# Patient Record
Sex: MALE | Race: WHITE | NOT HISPANIC OR LATINO | Employment: FULL TIME | URBAN - METROPOLITAN AREA
[De-identification: names, ages, dates, MRNs, and addresses within clinical notes are randomized per-mention and may not be internally consistent; named-entity substitution may affect disease eponyms.]

---

## 2021-11-03 ENCOUNTER — TELEPHONE (OUTPATIENT)
Dept: FAMILY MEDICINE | Facility: CLINIC | Age: 61
End: 2021-11-03
Payer: COMMERCIAL

## 2021-12-02 ENCOUNTER — OFFICE VISIT (OUTPATIENT)
Dept: FAMILY MEDICINE | Facility: CLINIC | Age: 61
End: 2021-12-02
Payer: COMMERCIAL

## 2021-12-02 ENCOUNTER — LAB VISIT (OUTPATIENT)
Dept: LAB | Facility: HOSPITAL | Age: 61
End: 2021-12-02
Attending: FAMILY MEDICINE
Payer: COMMERCIAL

## 2021-12-02 VITALS
HEART RATE: 81 BPM | BODY MASS INDEX: 27.17 KG/M2 | DIASTOLIC BLOOD PRESSURE: 88 MMHG | SYSTOLIC BLOOD PRESSURE: 138 MMHG | HEIGHT: 78 IN | OXYGEN SATURATION: 96 % | WEIGHT: 234.81 LBS

## 2021-12-02 DIAGNOSIS — Z00.00 VISIT FOR WELL MAN HEALTH CHECK: Primary | ICD-10-CM

## 2021-12-02 DIAGNOSIS — L85.8 CUTANEOUS HORN: ICD-10-CM

## 2021-12-02 DIAGNOSIS — E78.49 OTHER HYPERLIPIDEMIA: ICD-10-CM

## 2021-12-02 DIAGNOSIS — Z76.89 ENCOUNTER TO ESTABLISH CARE WITH NEW DOCTOR: ICD-10-CM

## 2021-12-02 DIAGNOSIS — K42.9 UMBILICAL HERNIA WITHOUT OBSTRUCTION AND WITHOUT GANGRENE: ICD-10-CM

## 2021-12-02 DIAGNOSIS — Z86.718 HISTORY OF DVT (DEEP VEIN THROMBOSIS): ICD-10-CM

## 2021-12-02 DIAGNOSIS — Z11.59 NEED FOR HEPATITIS C SCREENING TEST: ICD-10-CM

## 2021-12-02 DIAGNOSIS — Z12.5 SCREENING PSA (PROSTATE SPECIFIC ANTIGEN): ICD-10-CM

## 2021-12-02 DIAGNOSIS — E11.65 TYPE 2 DIABETES MELLITUS WITH HYPERGLYCEMIA, WITHOUT LONG-TERM CURRENT USE OF INSULIN: ICD-10-CM

## 2021-12-02 DIAGNOSIS — I83.812 VARICOSE VEINS OF LEFT LOWER EXTREMITY WITH PAIN: ICD-10-CM

## 2021-12-02 DIAGNOSIS — I10 ESSENTIAL HYPERTENSION: ICD-10-CM

## 2021-12-02 DIAGNOSIS — K21.9 GERD WITHOUT ESOPHAGITIS: ICD-10-CM

## 2021-12-02 DIAGNOSIS — Z12.11 COLON CANCER SCREENING: ICD-10-CM

## 2021-12-02 DIAGNOSIS — Z00.00 VISIT FOR WELL MAN HEALTH CHECK: ICD-10-CM

## 2021-12-02 DIAGNOSIS — M1A.9XX0 CHRONIC GOUT WITHOUT TOPHUS, UNSPECIFIED CAUSE, UNSPECIFIED SITE: ICD-10-CM

## 2021-12-02 LAB
25(OH)D3+25(OH)D2 SERPL-MCNC: 18 NG/ML (ref 30–96)
ALBUMIN SERPL BCP-MCNC: 3.9 G/DL (ref 3.5–5.2)
ALP SERPL-CCNC: 55 U/L (ref 55–135)
ALT SERPL W/O P-5'-P-CCNC: 11 U/L (ref 10–44)
ANION GAP SERPL CALC-SCNC: 10 MMOL/L (ref 8–16)
AST SERPL-CCNC: 22 U/L (ref 10–40)
BASOPHILS # BLD AUTO: 0.07 K/UL (ref 0–0.2)
BASOPHILS NFR BLD: 0.9 % (ref 0–1.9)
BILIRUB SERPL-MCNC: 0.3 MG/DL (ref 0.1–1)
BUN SERPL-MCNC: 18 MG/DL (ref 8–23)
CALCIUM SERPL-MCNC: 9.4 MG/DL (ref 8.7–10.5)
CHLORIDE SERPL-SCNC: 101 MMOL/L (ref 95–110)
CHOLEST SERPL-MCNC: 183 MG/DL (ref 120–199)
CHOLEST/HDLC SERPL: 4.5 {RATIO} (ref 2–5)
CO2 SERPL-SCNC: 28 MMOL/L (ref 23–29)
COMPLEXED PSA SERPL-MCNC: 0.81 NG/ML (ref 0–4)
CREAT SERPL-MCNC: 0.7 MG/DL (ref 0.5–1.4)
DIFFERENTIAL METHOD: ABNORMAL
EOSINOPHIL # BLD AUTO: 0.2 K/UL (ref 0–0.5)
EOSINOPHIL NFR BLD: 2.5 % (ref 0–8)
ERYTHROCYTE [DISTWIDTH] IN BLOOD BY AUTOMATED COUNT: 12.9 % (ref 11.5–14.5)
EST. GFR  (AFRICAN AMERICAN): >60 ML/MIN/1.73 M^2
EST. GFR  (NON AFRICAN AMERICAN): >60 ML/MIN/1.73 M^2
ESTIMATED AVG GLUCOSE: 111 MG/DL (ref 68–131)
GLUCOSE SERPL-MCNC: 90 MG/DL (ref 70–110)
HBA1C MFR BLD: 5.5 % (ref 4–5.6)
HCT VFR BLD AUTO: 42.2 % (ref 40–54)
HDLC SERPL-MCNC: 41 MG/DL (ref 40–75)
HDLC SERPL: 22.4 % (ref 20–50)
HGB BLD-MCNC: 13.1 G/DL (ref 14–18)
IMM GRANULOCYTES # BLD AUTO: 0.02 K/UL (ref 0–0.04)
IMM GRANULOCYTES NFR BLD AUTO: 0.3 % (ref 0–0.5)
LDLC SERPL CALC-MCNC: 105 MG/DL (ref 63–159)
LYMPHOCYTES # BLD AUTO: 2.1 K/UL (ref 1–4.8)
LYMPHOCYTES NFR BLD: 28.3 % (ref 18–48)
MCH RBC QN AUTO: 28.5 PG (ref 27–31)
MCHC RBC AUTO-ENTMCNC: 31 G/DL (ref 32–36)
MCV RBC AUTO: 92 FL (ref 82–98)
MONOCYTES # BLD AUTO: 0.6 K/UL (ref 0.3–1)
MONOCYTES NFR BLD: 8.6 % (ref 4–15)
NEUTROPHILS # BLD AUTO: 4.4 K/UL (ref 1.8–7.7)
NEUTROPHILS NFR BLD: 59.4 % (ref 38–73)
NONHDLC SERPL-MCNC: 142 MG/DL
NRBC BLD-RTO: 0 /100 WBC
PLATELET # BLD AUTO: 293 K/UL (ref 150–450)
PMV BLD AUTO: 10.7 FL (ref 9.2–12.9)
POTASSIUM SERPL-SCNC: 4 MMOL/L (ref 3.5–5.1)
PROT SERPL-MCNC: 7.2 G/DL (ref 6–8.4)
RBC # BLD AUTO: 4.59 M/UL (ref 4.6–6.2)
SODIUM SERPL-SCNC: 139 MMOL/L (ref 136–145)
TRIGL SERPL-MCNC: 185 MG/DL (ref 30–150)
TSH SERPL DL<=0.005 MIU/L-ACNC: 1.37 UIU/ML (ref 0.4–4)
URATE SERPL-MCNC: 6.4 MG/DL (ref 3.4–7)
VIT B12 SERPL-MCNC: 243 PG/ML (ref 210–950)
WBC # BLD AUTO: 7.46 K/UL (ref 3.9–12.7)

## 2021-12-02 PROCEDURE — 99386 PR PREVENTIVE VISIT,NEW,40-64: ICD-10-PCS | Mod: S$GLB,,, | Performed by: FAMILY MEDICINE

## 2021-12-02 PROCEDURE — 84153 ASSAY OF PSA TOTAL: CPT | Performed by: FAMILY MEDICINE

## 2021-12-02 PROCEDURE — 99999 PR PBB SHADOW E&M-EST. PATIENT-LVL V: ICD-10-PCS | Mod: PBBFAC,,, | Performed by: FAMILY MEDICINE

## 2021-12-02 PROCEDURE — 84443 ASSAY THYROID STIM HORMONE: CPT | Performed by: FAMILY MEDICINE

## 2021-12-02 PROCEDURE — 82607 VITAMIN B-12: CPT | Performed by: FAMILY MEDICINE

## 2021-12-02 PROCEDURE — 36415 COLL VENOUS BLD VENIPUNCTURE: CPT | Mod: PO | Performed by: FAMILY MEDICINE

## 2021-12-02 PROCEDURE — 80061 LIPID PANEL: CPT | Performed by: FAMILY MEDICINE

## 2021-12-02 PROCEDURE — 83036 HEMOGLOBIN GLYCOSYLATED A1C: CPT | Performed by: FAMILY MEDICINE

## 2021-12-02 PROCEDURE — 4010F PR ACE/ARB THEARPY RXD/TAKEN: ICD-10-PCS | Mod: CPTII,S$GLB,, | Performed by: FAMILY MEDICINE

## 2021-12-02 PROCEDURE — 84550 ASSAY OF BLOOD/URIC ACID: CPT | Performed by: FAMILY MEDICINE

## 2021-12-02 PROCEDURE — 86803 HEPATITIS C AB TEST: CPT | Performed by: FAMILY MEDICINE

## 2021-12-02 PROCEDURE — 99386 PREV VISIT NEW AGE 40-64: CPT | Mod: S$GLB,,, | Performed by: FAMILY MEDICINE

## 2021-12-02 PROCEDURE — 85025 COMPLETE CBC W/AUTO DIFF WBC: CPT | Performed by: FAMILY MEDICINE

## 2021-12-02 PROCEDURE — 99999 PR PBB SHADOW E&M-EST. PATIENT-LVL V: CPT | Mod: PBBFAC,,, | Performed by: FAMILY MEDICINE

## 2021-12-02 PROCEDURE — 4010F ACE/ARB THERAPY RXD/TAKEN: CPT | Mod: CPTII,S$GLB,, | Performed by: FAMILY MEDICINE

## 2021-12-02 PROCEDURE — 82306 VITAMIN D 25 HYDROXY: CPT | Performed by: FAMILY MEDICINE

## 2021-12-02 PROCEDURE — 80053 COMPREHEN METABOLIC PANEL: CPT | Performed by: FAMILY MEDICINE

## 2021-12-02 RX ORDER — LOSARTAN POTASSIUM 50 MG/1
50 TABLET ORAL DAILY
Qty: 90 TABLET | Refills: 0 | Status: SHIPPED | OUTPATIENT
Start: 2021-12-02 | End: 2021-12-15 | Stop reason: SDUPTHER

## 2021-12-03 LAB — HCV AB SERPL QL IA: NEGATIVE

## 2021-12-05 ENCOUNTER — TELEPHONE (OUTPATIENT)
Dept: FAMILY MEDICINE | Facility: CLINIC | Age: 61
End: 2021-12-05
Payer: COMMERCIAL

## 2021-12-05 DIAGNOSIS — D51.8 OTHER VITAMIN B12 DEFICIENCY ANEMIA: ICD-10-CM

## 2021-12-05 DIAGNOSIS — R31.21 ASYMPTOMATIC MICROSCOPIC HEMATURIA: Primary | ICD-10-CM

## 2021-12-05 DIAGNOSIS — E78.49 OTHER HYPERLIPIDEMIA: ICD-10-CM

## 2021-12-05 DIAGNOSIS — E55.9 VITAMIN D DEFICIENCY: ICD-10-CM

## 2021-12-05 PROBLEM — D51.9 ANEMIA DUE TO VITAMIN B12 DEFICIENCY: Status: ACTIVE | Noted: 2021-12-05

## 2021-12-05 PROBLEM — E11.65 TYPE 2 DIABETES MELLITUS WITH HYPERGLYCEMIA, WITHOUT LONG-TERM CURRENT USE OF INSULIN: Status: RESOLVED | Noted: 2021-12-02 | Resolved: 2021-12-05

## 2021-12-05 RX ORDER — ERGOCALCIFEROL 1.25 MG/1
50000 CAPSULE ORAL
Qty: 12 CAPSULE | Refills: 0 | Status: SHIPPED | OUTPATIENT
Start: 2021-12-05 | End: 2022-02-18

## 2021-12-05 RX ORDER — ROSUVASTATIN CALCIUM 5 MG/1
5 TABLET, COATED ORAL DAILY
Qty: 90 TABLET | Refills: 3 | Status: SHIPPED | OUTPATIENT
Start: 2021-12-05 | End: 2022-03-23

## 2021-12-05 RX ORDER — LANOLIN ALCOHOL/MO/W.PET/CERES
1000 CREAM (GRAM) TOPICAL DAILY
Qty: 90 TABLET | Refills: 5 | Status: SHIPPED | OUTPATIENT
Start: 2021-12-05 | End: 2021-12-15 | Stop reason: SDUPTHER

## 2021-12-07 ENCOUNTER — TELEPHONE (OUTPATIENT)
Dept: ADMINISTRATIVE | Facility: OTHER | Age: 61
End: 2021-12-07
Payer: COMMERCIAL

## 2021-12-15 ENCOUNTER — OFFICE VISIT (OUTPATIENT)
Dept: FAMILY MEDICINE | Facility: CLINIC | Age: 61
End: 2021-12-15
Payer: COMMERCIAL

## 2021-12-15 ENCOUNTER — LAB VISIT (OUTPATIENT)
Dept: LAB | Facility: HOSPITAL | Age: 61
End: 2021-12-15
Attending: FAMILY MEDICINE
Payer: COMMERCIAL

## 2021-12-15 VITALS
OXYGEN SATURATION: 98 % | BODY MASS INDEX: 27.42 KG/M2 | HEIGHT: 78 IN | HEART RATE: 73 BPM | SYSTOLIC BLOOD PRESSURE: 128 MMHG | WEIGHT: 237 LBS | DIASTOLIC BLOOD PRESSURE: 80 MMHG

## 2021-12-15 DIAGNOSIS — R31.21 ASYMPTOMATIC MICROSCOPIC HEMATURIA: ICD-10-CM

## 2021-12-15 DIAGNOSIS — D51.8 OTHER VITAMIN B12 DEFICIENCY ANEMIA: ICD-10-CM

## 2021-12-15 DIAGNOSIS — E78.49 OTHER HYPERLIPIDEMIA: ICD-10-CM

## 2021-12-15 DIAGNOSIS — R31.21 ASYMPTOMATIC MICROSCOPIC HEMATURIA: Primary | ICD-10-CM

## 2021-12-15 DIAGNOSIS — Z12.11 COLON CANCER SCREENING: ICD-10-CM

## 2021-12-15 DIAGNOSIS — I10 ESSENTIAL HYPERTENSION: ICD-10-CM

## 2021-12-15 LAB
BILIRUB UR QL STRIP: NEGATIVE
CLARITY UR REFRACT.AUTO: CLEAR
COLOR UR AUTO: YELLOW
GLUCOSE UR QL STRIP: NEGATIVE
HGB UR QL STRIP: NEGATIVE
KETONES UR QL STRIP: NEGATIVE
LEUKOCYTE ESTERASE UR QL STRIP: NEGATIVE
NITRITE UR QL STRIP: NEGATIVE
PH UR STRIP: 7 [PH] (ref 5–8)
PROT UR QL STRIP: NEGATIVE
SP GR UR STRIP: 1.02 (ref 1–1.03)
URN SPEC COLLECT METH UR: NORMAL

## 2021-12-15 PROCEDURE — 99999 PR PBB SHADOW E&M-EST. PATIENT-LVL IV: CPT | Mod: PBBFAC,,, | Performed by: FAMILY MEDICINE

## 2021-12-15 PROCEDURE — 3061F PR NEG MICROALBUMINURIA RESULT DOCUMENTED/REVIEW: ICD-10-PCS | Mod: CPTII,S$GLB,, | Performed by: FAMILY MEDICINE

## 2021-12-15 PROCEDURE — 4010F PR ACE/ARB THEARPY RXD/TAKEN: ICD-10-PCS | Mod: CPTII,S$GLB,, | Performed by: FAMILY MEDICINE

## 2021-12-15 PROCEDURE — 3061F NEG MICROALBUMINURIA REV: CPT | Mod: CPTII,S$GLB,, | Performed by: FAMILY MEDICINE

## 2021-12-15 PROCEDURE — 4010F ACE/ARB THERAPY RXD/TAKEN: CPT | Mod: CPTII,S$GLB,, | Performed by: FAMILY MEDICINE

## 2021-12-15 PROCEDURE — 3066F NEPHROPATHY DOC TX: CPT | Mod: CPTII,S$GLB,, | Performed by: FAMILY MEDICINE

## 2021-12-15 PROCEDURE — 81003 URINALYSIS AUTO W/O SCOPE: CPT | Performed by: FAMILY MEDICINE

## 2021-12-15 PROCEDURE — 99213 PR OFFICE/OUTPT VISIT, EST, LEVL III, 20-29 MIN: ICD-10-PCS | Mod: S$GLB,,, | Performed by: FAMILY MEDICINE

## 2021-12-15 PROCEDURE — 99213 OFFICE O/P EST LOW 20 MIN: CPT | Mod: S$GLB,,, | Performed by: FAMILY MEDICINE

## 2021-12-15 PROCEDURE — 99999 PR PBB SHADOW E&M-EST. PATIENT-LVL IV: ICD-10-PCS | Mod: PBBFAC,,, | Performed by: FAMILY MEDICINE

## 2021-12-15 PROCEDURE — 3066F PR DOCUMENTATION OF TREATMENT FOR NEPHROPATHY: ICD-10-PCS | Mod: CPTII,S$GLB,, | Performed by: FAMILY MEDICINE

## 2021-12-15 RX ORDER — LANOLIN ALCOHOL/MO/W.PET/CERES
1000 CREAM (GRAM) TOPICAL DAILY
Qty: 90 TABLET | Refills: 5 | Status: SHIPPED | OUTPATIENT
Start: 2021-12-15 | End: 2022-06-15 | Stop reason: SDUPTHER

## 2021-12-15 RX ORDER — LOSARTAN POTASSIUM 50 MG/1
50 TABLET ORAL DAILY
Qty: 90 TABLET | Refills: 1 | Status: SHIPPED | OUTPATIENT
Start: 2021-12-15 | End: 2022-06-15 | Stop reason: SDUPTHER

## 2021-12-16 ENCOUNTER — OFFICE VISIT (OUTPATIENT)
Dept: DERMATOLOGY | Facility: CLINIC | Age: 61
End: 2021-12-16
Payer: COMMERCIAL

## 2021-12-16 DIAGNOSIS — D48.5 NEOPLASM OF UNCERTAIN BEHAVIOR OF SKIN: Primary | ICD-10-CM

## 2021-12-16 DIAGNOSIS — L82.1 SK (SEBORRHEIC KERATOSIS): ICD-10-CM

## 2021-12-16 DIAGNOSIS — L57.0 AK (ACTINIC KERATOSIS): ICD-10-CM

## 2021-12-16 PROCEDURE — 3066F NEPHROPATHY DOC TX: CPT | Mod: CPTII,S$GLB,, | Performed by: DERMATOLOGY

## 2021-12-16 PROCEDURE — 3061F PR NEG MICROALBUMINURIA RESULT DOCUMENTED/REVIEW: ICD-10-PCS | Mod: CPTII,S$GLB,, | Performed by: DERMATOLOGY

## 2021-12-16 PROCEDURE — 88305 TISSUE EXAM BY PATHOLOGIST: CPT | Mod: 26,,, | Performed by: PATHOLOGY

## 2021-12-16 PROCEDURE — 11102 PR TANGENTIAL BIOPSY, SKIN, SINGLE LESION: ICD-10-PCS | Mod: S$GLB,,, | Performed by: DERMATOLOGY

## 2021-12-16 PROCEDURE — 11102 TANGNTL BX SKIN SINGLE LES: CPT | Mod: S$GLB,,, | Performed by: DERMATOLOGY

## 2021-12-16 PROCEDURE — 88305 TISSUE EXAM BY PATHOLOGIST: ICD-10-PCS | Mod: 26,,, | Performed by: PATHOLOGY

## 2021-12-16 PROCEDURE — 99203 PR OFFICE/OUTPT VISIT, NEW, LEVL III, 30-44 MIN: ICD-10-PCS | Mod: 25,S$GLB,, | Performed by: DERMATOLOGY

## 2021-12-16 PROCEDURE — 17000 DESTRUCT PREMALG LESION: CPT | Mod: 59,S$GLB,, | Performed by: DERMATOLOGY

## 2021-12-16 PROCEDURE — 3061F NEG MICROALBUMINURIA REV: CPT | Mod: CPTII,S$GLB,, | Performed by: DERMATOLOGY

## 2021-12-16 PROCEDURE — 17000 PR DESTRUCTION(LASER SURGERY,CRYOSURGERY,CHEMOSURGERY),PREMALIGNANT LESIONS,FIRST LESION: ICD-10-PCS | Mod: 59,S$GLB,, | Performed by: DERMATOLOGY

## 2021-12-16 PROCEDURE — 99999 PR PBB SHADOW E&M-EST. PATIENT-LVL III: CPT | Mod: PBBFAC,,, | Performed by: DERMATOLOGY

## 2021-12-16 PROCEDURE — 4010F ACE/ARB THERAPY RXD/TAKEN: CPT | Mod: CPTII,S$GLB,, | Performed by: DERMATOLOGY

## 2021-12-16 PROCEDURE — 3066F PR DOCUMENTATION OF TREATMENT FOR NEPHROPATHY: ICD-10-PCS | Mod: CPTII,S$GLB,, | Performed by: DERMATOLOGY

## 2021-12-16 PROCEDURE — 99203 OFFICE O/P NEW LOW 30 MIN: CPT | Mod: 25,S$GLB,, | Performed by: DERMATOLOGY

## 2021-12-16 PROCEDURE — 88305 TISSUE EXAM BY PATHOLOGIST: CPT | Performed by: PATHOLOGY

## 2021-12-16 PROCEDURE — 4010F PR ACE/ARB THEARPY RXD/TAKEN: ICD-10-PCS | Mod: CPTII,S$GLB,, | Performed by: DERMATOLOGY

## 2021-12-16 PROCEDURE — 99999 PR PBB SHADOW E&M-EST. PATIENT-LVL III: ICD-10-PCS | Mod: PBBFAC,,, | Performed by: DERMATOLOGY

## 2021-12-17 ENCOUNTER — TELEPHONE (OUTPATIENT)
Dept: FAMILY MEDICINE | Facility: CLINIC | Age: 61
End: 2021-12-17
Payer: COMMERCIAL

## 2021-12-17 ENCOUNTER — TELEPHONE (OUTPATIENT)
Dept: UROLOGY | Facility: CLINIC | Age: 61
End: 2021-12-17
Payer: COMMERCIAL

## 2021-12-22 LAB
FINAL PATHOLOGIC DIAGNOSIS: NORMAL
GROSS: NORMAL
Lab: NORMAL
MICROSCOPIC EXAM: NORMAL

## 2021-12-27 DIAGNOSIS — L57.0 AK (ACTINIC KERATOSIS): Primary | ICD-10-CM

## 2021-12-27 RX ORDER — FLUOROURACIL 50 MG/G
CREAM TOPICAL
Qty: 40 G | Refills: 1 | Status: SHIPPED | OUTPATIENT
Start: 2021-12-27

## 2021-12-28 ENCOUNTER — TELEPHONE (OUTPATIENT)
Dept: DERMATOLOGY | Facility: CLINIC | Age: 61
End: 2021-12-28
Payer: COMMERCIAL

## 2022-01-03 DIAGNOSIS — I83.893 VARICOSE VEINS OF LEG WITH EDEMA, BILATERAL: Primary | ICD-10-CM

## 2022-01-03 DIAGNOSIS — I83.893 VARICOSE VEINS OF LEG WITH SWELLING, BILATERAL: ICD-10-CM

## 2022-01-03 DIAGNOSIS — I83.813 VARICOSE VEINS OF LEG WITH PAIN, BILATERAL: ICD-10-CM

## 2022-01-04 ENCOUNTER — HOSPITAL ENCOUNTER (OUTPATIENT)
Dept: RADIOLOGY | Facility: OTHER | Age: 62
Discharge: HOME | End: 2022-01-04
Attending: SURGERY
Payer: COMMERCIAL

## 2022-01-04 DIAGNOSIS — I83.813 VARICOSE VEINS OF LEG WITH PAIN, BILATERAL: ICD-10-CM

## 2022-01-04 DIAGNOSIS — I83.893 VARICOSE VEINS OF LEG WITH SWELLING, BILATERAL: ICD-10-CM

## 2022-01-04 DIAGNOSIS — I83.893 VARICOSE VEINS OF LEG WITH EDEMA, BILATERAL: ICD-10-CM

## 2022-01-04 PROCEDURE — 93970 EXTREMITY STUDY: CPT | Mod: 26,,, | Performed by: RADIOLOGY

## 2022-01-04 PROCEDURE — 93970 US LOWER EXTREMITY VEINS BILATERAL INSUFFICIENCY: ICD-10-PCS | Mod: 26,,, | Performed by: RADIOLOGY

## 2022-01-04 PROCEDURE — 93970 EXTREMITY STUDY: CPT | Mod: TC

## 2022-01-05 ENCOUNTER — INITIAL CONSULT (OUTPATIENT)
Dept: VASCULAR SURGERY | Facility: CLINIC | Age: 62
End: 2022-01-05
Payer: COMMERCIAL

## 2022-01-05 VITALS — DIASTOLIC BLOOD PRESSURE: 108 MMHG | SYSTOLIC BLOOD PRESSURE: 168 MMHG | HEART RATE: 63 BPM

## 2022-01-05 DIAGNOSIS — I83.023 VENOUS STASIS ULCER OF LEFT ANKLE LIMITED TO BREAKDOWN OF SKIN, UNSPECIFIED WHETHER VARICOSE VEINS PRESENT: Primary | ICD-10-CM

## 2022-01-05 DIAGNOSIS — L97.321 VENOUS STASIS ULCER OF LEFT ANKLE LIMITED TO BREAKDOWN OF SKIN, UNSPECIFIED WHETHER VARICOSE VEINS PRESENT: Primary | ICD-10-CM

## 2022-01-05 DIAGNOSIS — I83.812 VARICOSE VEINS OF LEFT LOWER EXTREMITY WITH PAIN: ICD-10-CM

## 2022-01-05 PROCEDURE — 99202 OFFICE O/P NEW SF 15 MIN: CPT | Mod: S$GLB,,, | Performed by: SURGERY

## 2022-01-05 PROCEDURE — 99202 PR OFFICE/OUTPT VISIT, NEW, LEVL II, 15-29 MIN: ICD-10-PCS | Mod: S$GLB,,, | Performed by: SURGERY

## 2022-01-05 NOTE — PROGRESS NOTES
VASCULAR SURGERY CLINIC NOTE    Patient ID: Kevan Norris is a 61 y.o. male.    I. HISTORY     Chief Complaint: left leg ulcer    HPI: Kevan Norris is a 61 y.o. male who is referred here today for evaluation of LLE venous stasis wound. He spends most of his time standing. He was wearing compression hose but never got a new pair and has been using the same pair for years so they no longer provided adequate compression. He arrives today to discuss his recurrent left lateral ankle ulceration. He has had previous proceudres for his veins but he is not sure what they were.       Past Medical History:   Diagnosis Date    Blood clot in vein     Right Leg,     Diabetes mellitus     Diabetes mellitus, type 2     Gout flare     Hyperlipemia     Prostate atrophy     Varicose veins         Past Surgical History:   Procedure Laterality Date    VARICOSE VEIN SURGERY         Social History     Occupational History    Not on file   Tobacco Use    Smoking status: Former Smoker     Years: 6.00     Quit date:      Years since quittin.0    Smokeless tobacco: Never Used   Substance and Sexual Activity    Alcohol use: Yes     Comment: drinks beer, 2 cans, about 2x/week    Drug use: No    Sexual activity: Not Currently         Current Outpatient Medications:     aspirin 81 mg Cap, Take by mouth., Disp: , Rfl:     cyanocobalamin (VITAMIN B-12) 1000 MCG tablet, Take 1 tablet (1,000 mcg total) by mouth once daily., Disp: 90 tablet, Rfl: 5    ergocalciferol (ERGOCALCIFEROL) 50,000 unit Cap, Take 1 capsule (50,000 Units total) by mouth every 7 days. Then start daily OTC replacement after this Rx is complete, Disp: 12 capsule, Rfl: 0    fluorouraciL (EFUDEX) 5 % cream, Apply thin film to left lower forearm 2times per day for 3 weeks; d/c if area bleeding or ulcerated; avoid eyes or mouth, Disp: 40 g, Rfl: 1    losartan (COZAAR) 50 MG tablet, Take 1 tablet (50 mg total) by mouth once daily., Disp: 90  tablet, Rfl: 1    rosuvastatin (CRESTOR) 5 MG tablet, Take 1 tablet (5 mg total) by mouth once daily., Disp: 90 tablet, Rfl: 3    Review of Systems   Constitutional: Negative for weight loss.   HENT: Negative for ear pain and nosebleeds.    Eyes: Negative for discharge and pain.   Cardiovascular: Negative for chest pain and palpitations.   Respiratory: Negative for cough, shortness of breath and wheezing.    Endocrine: Negative for cold intolerance, heat intolerance and polydipsia.   Hematologic/Lymphatic: Negative for adenopathy. Does not bruise/bleed easily.   Skin: Negative for itching and rash.   Musculoskeletal: Negative for joint swelling and muscle cramps.   Gastrointestinal: Negative for abdominal pain, diarrhea, nausea and vomiting.   Genitourinary: Negative for dysuria and flank pain.   Neurological: Negative for numbness and seizures.         II. PHYSICAL EXAM     Physical Exam  Constitutional:       Appearance: Normal appearance. He is not ill-appearing or diaphoretic.      Comments: Disheveled, malodorous, wearing two different shoes which are breaking at the seems   HENT:      Head: Normocephalic and atraumatic.   Eyes:      General: No scleral icterus.        Right eye: No discharge.         Left eye: No discharge.      Extraocular Movements: Extraocular movements intact.      Conjunctiva/sclera: Conjunctivae normal.   Cardiovascular:      Rate and Rhythm: Normal rate and regular rhythm.   Pulmonary:      Effort: Pulmonary effort is normal. No respiratory distress.   Musculoskeletal:         General: Normal range of motion.      Cervical back: Normal range of motion and neck supple.   Skin:     General: Skin is warm and dry.      Comments: Left lateral ankle venous stasis ulceration with clean base, no odor from ulcer but feet are malodorous bilaterally   Neurological:      General: No focal deficit present.      Mental Status: He is alert and oriented to person, place, and time.   Psychiatric:          Mood and Affect: Mood normal.         Behavior: Behavior normal.         Reticular/Spider veins noted:  RLE: scattered   LLE: scattered    Varicose veins noted:  RLE: anterior/lateral left leg, popliteal fossa  LLE:  Right medial and anterior thigh and calf, popliteal fossa    Imaging Results: (I have personally reviewed the images/studies and provided my interpretation below)  BLE Venous Duplex ultrasound 1/4/22 Impression:   Right Leg: Deep Venous system: no DVT, no reflux. GSV: + reflux. LSV: no reflux  Left Leg: Deep Venous system:  no DVT, no reflux. GSV: ablated. LSV: no reflux    III. ASSESSMENT & PLAN (MEDICAL DECISION MAKING)     1. Venous stasis ulcer of left ankle limited to breakdown of skin, unspecified whether varicose veins present    2. Varicose veins of left lower extremity with pain          Assessment/Diagnosis and Plan:  61 y.o. male referred for evaluation of LLE venous stasis ulcer. CEAP class 6. Ultrasound of lower extremities reveals evidence of prior LLE GSV ablation and AASV ablation with no evidence of LSV reflux. He has reflux on the RLE GSV but he is asymptomatic. Unfortunately he has already had LLE GSV ablation and so no futher surgical intervention can be performed to prevent recurrence of his venous stasis ulceration. He will need diligent woundcare and compression to heal his ulceration. Will refer patient to woundcare clinic for Unna boot treatment of his LLE venous stasis ulcer. Dressing reapplied in clinic. Patient instructed to change dressing daily and keep clean until his woundcare appt.    -ACE wrap and lightly moistened 4x4 gauze applied to left foot/lower leg  -Referral to woundcare for Unna boot treatment of LLE lateral ankle venous stasis ulcer  -Recommend compression with 20-30mmHg Rx stockings once ulcer has healed, elevation  -Exercise regularly  -RTC POLLY Ozuna II, MD, [facility]  Vascular Surgery

## 2022-01-06 ENCOUNTER — PATIENT OUTREACH (OUTPATIENT)
Dept: ADMINISTRATIVE | Facility: OTHER | Age: 62
End: 2022-01-06
Payer: COMMERCIAL

## 2022-01-06 NOTE — PROGRESS NOTES
Health Maintenance Due   Topic Date Due    HIV Screening  Never done    TETANUS VACCINE  Never done    Colorectal Cancer Screening  Never done    Shingles Vaccine (1 of 2) Never done     Updates were requested from care everywhere.  Chart was reviewed for overdue Proactive [facility] Encounters ([facility]) topics (CRS, Breast Cancer Screening, Eye exam)  Health Maintenance has been updated.  LINKS immunization registry triggered.  Immunizations were reconciled.  Pt has open case request for colonoscopy. FIT kit not ordered.

## 2022-01-07 ENCOUNTER — OFFICE VISIT (OUTPATIENT)
Dept: UROLOGY | Facility: CLINIC | Age: 62
End: 2022-01-07
Payer: COMMERCIAL

## 2022-01-07 ENCOUNTER — LAB VISIT (OUTPATIENT)
Dept: LAB | Facility: HOSPITAL | Age: 62
End: 2022-01-07
Attending: STUDENT IN AN ORGANIZED HEALTH CARE EDUCATION/TRAINING PROGRAM
Payer: COMMERCIAL

## 2022-01-07 VITALS
BODY MASS INDEX: 27.84 KG/M2 | DIASTOLIC BLOOD PRESSURE: 83 MMHG | SYSTOLIC BLOOD PRESSURE: 119 MMHG | HEART RATE: 82 BPM | WEIGHT: 240.63 LBS | HEIGHT: 78 IN

## 2022-01-07 DIAGNOSIS — R31.21 ASYMPTOMATIC MICROSCOPIC HEMATURIA: ICD-10-CM

## 2022-01-07 LAB
ANION GAP SERPL CALC-SCNC: 5 MMOL/L (ref 8–16)
BUN SERPL-MCNC: 21 MG/DL (ref 8–23)
CALCIUM SERPL-MCNC: 9.4 MG/DL (ref 8.7–10.5)
CHLORIDE SERPL-SCNC: 102 MMOL/L (ref 95–110)
CO2 SERPL-SCNC: 31 MMOL/L (ref 23–29)
CREAT SERPL-MCNC: 0.9 MG/DL (ref 0.5–1.4)
EST. GFR  (AFRICAN AMERICAN): >60 ML/MIN/1.73 M^2
EST. GFR  (NON AFRICAN AMERICAN): >60 ML/MIN/1.73 M^2
GLUCOSE SERPL-MCNC: 87 MG/DL (ref 70–110)
POTASSIUM SERPL-SCNC: 3.9 MMOL/L (ref 3.5–5.1)
SODIUM SERPL-SCNC: 138 MMOL/L (ref 136–145)

## 2022-01-07 PROCEDURE — 99999 PR PBB SHADOW E&M-EST. PATIENT-LVL III: CPT | Mod: PBBFAC,,, | Performed by: STUDENT IN AN ORGANIZED HEALTH CARE EDUCATION/TRAINING PROGRAM

## 2022-01-07 PROCEDURE — 3008F PR BODY MASS INDEX (BMI) DOCUMENTED: ICD-10-PCS | Mod: CPTII,S$GLB,, | Performed by: STUDENT IN AN ORGANIZED HEALTH CARE EDUCATION/TRAINING PROGRAM

## 2022-01-07 PROCEDURE — 1159F PR MEDICATION LIST DOCUMENTED IN MEDICAL RECORD: ICD-10-PCS | Mod: CPTII,S$GLB,, | Performed by: STUDENT IN AN ORGANIZED HEALTH CARE EDUCATION/TRAINING PROGRAM

## 2022-01-07 PROCEDURE — 99999 PR PBB SHADOW E&M-EST. PATIENT-LVL III: ICD-10-PCS | Mod: PBBFAC,,, | Performed by: STUDENT IN AN ORGANIZED HEALTH CARE EDUCATION/TRAINING PROGRAM

## 2022-01-07 PROCEDURE — 1159F MED LIST DOCD IN RCRD: CPT | Mod: CPTII,S$GLB,, | Performed by: STUDENT IN AN ORGANIZED HEALTH CARE EDUCATION/TRAINING PROGRAM

## 2022-01-07 PROCEDURE — 3008F BODY MASS INDEX DOCD: CPT | Mod: CPTII,S$GLB,, | Performed by: STUDENT IN AN ORGANIZED HEALTH CARE EDUCATION/TRAINING PROGRAM

## 2022-01-07 PROCEDURE — 3079F PR MOST RECENT DIASTOLIC BLOOD PRESSURE 80-89 MM HG: ICD-10-PCS | Mod: CPTII,S$GLB,, | Performed by: STUDENT IN AN ORGANIZED HEALTH CARE EDUCATION/TRAINING PROGRAM

## 2022-01-07 PROCEDURE — 1160F RVW MEDS BY RX/DR IN RCRD: CPT | Mod: CPTII,S$GLB,, | Performed by: STUDENT IN AN ORGANIZED HEALTH CARE EDUCATION/TRAINING PROGRAM

## 2022-01-07 PROCEDURE — 36415 COLL VENOUS BLD VENIPUNCTURE: CPT | Performed by: STUDENT IN AN ORGANIZED HEALTH CARE EDUCATION/TRAINING PROGRAM

## 2022-01-07 PROCEDURE — 99204 PR OFFICE/OUTPT VISIT, NEW, LEVL IV, 45-59 MIN: ICD-10-PCS | Mod: S$GLB,,, | Performed by: STUDENT IN AN ORGANIZED HEALTH CARE EDUCATION/TRAINING PROGRAM

## 2022-01-07 PROCEDURE — 3074F PR MOST RECENT SYSTOLIC BLOOD PRESSURE < 130 MM HG: ICD-10-PCS | Mod: CPTII,S$GLB,, | Performed by: STUDENT IN AN ORGANIZED HEALTH CARE EDUCATION/TRAINING PROGRAM

## 2022-01-07 PROCEDURE — 3079F DIAST BP 80-89 MM HG: CPT | Mod: CPTII,S$GLB,, | Performed by: STUDENT IN AN ORGANIZED HEALTH CARE EDUCATION/TRAINING PROGRAM

## 2022-01-07 PROCEDURE — 1160F PR REVIEW ALL MEDS BY PRESCRIBER/CLIN PHARMACIST DOCUMENTED: ICD-10-PCS | Mod: CPTII,S$GLB,, | Performed by: STUDENT IN AN ORGANIZED HEALTH CARE EDUCATION/TRAINING PROGRAM

## 2022-01-07 PROCEDURE — 99204 OFFICE O/P NEW MOD 45 MIN: CPT | Mod: S$GLB,,, | Performed by: STUDENT IN AN ORGANIZED HEALTH CARE EDUCATION/TRAINING PROGRAM

## 2022-01-07 PROCEDURE — 3074F SYST BP LT 130 MM HG: CPT | Mod: CPTII,S$GLB,, | Performed by: STUDENT IN AN ORGANIZED HEALTH CARE EDUCATION/TRAINING PROGRAM

## 2022-01-07 PROCEDURE — 80048 BASIC METABOLIC PNL TOTAL CA: CPT | Performed by: STUDENT IN AN ORGANIZED HEALTH CARE EDUCATION/TRAINING PROGRAM

## 2022-01-07 RX ORDER — INFLUENZA A VIRUS A/VICTORIA/2570/2019 IVR-215 (H1N1) ANTIGEN (FORMALDEHYDE INACTIVATED), INFLUENZA A VIRUS A/TASMANIA/503/2020 IVR-221 (H3N2) ANTIGEN (FORMALDEHYDE INACTIVATED), INFLUENZA B VIRUS B/PHUKET/3073/2013 ANTIGEN (FORMALDEHYDE INACTIVATED), AND INFLUENZA B VIRUS B/WASHINGTON/02/2019 ANTIGEN (FORMALDEHYDE INACTIVATED) 15; 15; 15; 15 UG/.5ML; UG/.5ML; UG/.5ML; UG/.5ML
INJECTION, SUSPENSION INTRAMUSCULAR
COMMUNITY
Start: 2021-10-21 | End: 2022-06-15

## 2022-01-07 RX ORDER — GENTAMICIN SULFATE 1 MG/G
OINTMENT TOPICAL
COMMUNITY
Start: 2021-12-16

## 2022-01-07 NOTE — PROGRESS NOTES
Subjective:       Patient ID: Kevan Norris is a 61 y.o. male.    Chief Complaint:  Microscopic hematuria  This is a 61 y.o.  male patient that is new to me.  he is referred to me by Dr. Ott for microscopic hematuria.     The patient does take blood thinners aspirin 81mg.   he does not have a history of stones.   The patient does have a history of smoking. Former smoker quit .   Had workup before- yes. If yes, the last workup included cystoscopy and occurred Dr. Mahajan 10 years ago. .      Urinalysis history- 11 RBCs 21     Currently works in [employer], he has worked there for 30 years.      LAST PSA  Lab Results   Component Value Date    PSA 0.81 2021       Lab Results   Component Value Date    CREATININE 0.7 2021       ---  Past Medical History:   Diagnosis Date    Blood clot in vein     Right Leg,     Diabetes mellitus     Diabetes mellitus, type 2     Gout flare     Hyperlipemia     Prostate atrophy     Varicose veins        Past Surgical History:   Procedure Laterality Date    VARICOSE VEIN SURGERY         Family History   Problem Relation Age of Onset    Diabetes Father     Diabetes Sister        Social History     Tobacco Use    Smoking status: Former Smoker     Years: 6.00     Quit date:      Years since quittin.0    Smokeless tobacco: Never Used   Substance Use Topics    Alcohol use: Yes     Comment: drinks beer, 2 cans, about 2x/week    Drug use: No       Current Outpatient Medications on File Prior to Visit   Medication Sig Dispense Refill    aspirin 81 mg Cap Take by mouth.      cyanocobalamin (VITAMIN B-12) 1000 MCG tablet Take 1 tablet (1,000 mcg total) by mouth once daily. 90 tablet 5    ergocalciferol (ERGOCALCIFEROL) 50,000 unit Cap Take 1 capsule (50,000 Units total) by mouth every 7 days. Then start daily OTC replacement after this Rx is complete 12 capsule 0    fluorouraciL (EFUDEX) 5 % cream Apply thin film to left  lower forearm 2times per day for 3 weeks; d/c if area bleeding or ulcerated; avoid eyes or mouth 40 g 1    FLUZONE QUAD 1698-7480, PF, 60 mcg (15 mcg x 4)/0.5 mL Syrg       gentamicin (GARAMYCIN) 0.1 % ointment Apply topically.      losartan (COZAAR) 50 MG tablet Take 1 tablet (50 mg total) by mouth once daily. 90 tablet 1    rosuvastatin (CRESTOR) 5 MG tablet Take 1 tablet (5 mg total) by mouth once daily. 90 tablet 3     No current facility-administered medications on file prior to visit.       Review of patient's allergies indicates:  No Known Allergies    Review of Systems   Constitutional: Negative for chills.   HENT: Negative for congestion.    Eyes: Negative for visual disturbance.   Respiratory: Negative for shortness of breath.    Cardiovascular: Negative for chest pain.   Gastrointestinal: Negative for abdominal distention.   Musculoskeletal: Negative for gait problem.   Skin: Negative for color change.   Neurological: Negative for dizziness.   Psychiatric/Behavioral: Negative for agitation.       Objective:      Physical Exam  Constitutional:       Appearance: He is well-developed and well-nourished.   HENT:      Head: Normocephalic.   Eyes:      Pupils: Pupils are equal, round, and reactive to light.   Cardiovascular:      Pulses: Intact distal pulses.   Pulmonary:      Effort: Pulmonary effort is normal.   Abdominal:      Palpations: Abdomen is soft.   Musculoskeletal:         General: Normal range of motion.      Cervical back: Normal range of motion.   Skin:     General: Skin is warm and dry.   Neurological:      Mental Status: He is alert.   Psychiatric:         Mood and Affect: Mood and affect normal.         Assessment:       1. Asymptomatic microscopic hematuria        Plan:         1. The patient has microscopic hematuria  I counseled the patient on the American Urology Guidelines for a hematuria workup.  The criteria for microscopic hematuria is 3 red blood cells on microscopic urinalysis  and the workup is recommended for any patient experiencing gross hematuria. The workup includes a CT urogram and a flexible cystoscopy performed here in the clinic. After answering all of the questions about the workup the patient was amenable in proceeding.  2. BMP, CTU cystoscopy.      Asymptomatic microscopic hematuria  -     Ambulatory referral/consult to Urology  -     Basic Metabolic Panel; Future; Expected date: 01/07/2022  -     CT Urogram Abd Pelvis W WO; Future; Expected date: 01/07/2022  -     Cystoscopy; Future; Expected date: 01/07/2022

## 2022-01-10 ENCOUNTER — HOSPITAL ENCOUNTER (OUTPATIENT)
Dept: RADIOLOGY | Facility: HOSPITAL | Age: 62
Discharge: HOME | End: 2022-01-10
Attending: STUDENT IN AN ORGANIZED HEALTH CARE EDUCATION/TRAINING PROGRAM
Payer: COMMERCIAL

## 2022-01-10 DIAGNOSIS — R31.21 ASYMPTOMATIC MICROSCOPIC HEMATURIA: ICD-10-CM

## 2022-01-10 PROCEDURE — 74178 CT ABD&PLV WO CNTR FLWD CNTR: CPT | Mod: TC

## 2022-01-10 PROCEDURE — 74178 CT ABD&PLV WO CNTR FLWD CNTR: CPT | Mod: 26,,, | Performed by: RADIOLOGY

## 2022-01-10 PROCEDURE — 74178 CT UROGRAM ABD PELVIS W WO: ICD-10-PCS | Mod: 26,,, | Performed by: RADIOLOGY

## 2022-01-10 PROCEDURE — 25500020 PHARM REV CODE 255: Performed by: STUDENT IN AN ORGANIZED HEALTH CARE EDUCATION/TRAINING PROGRAM

## 2022-01-10 RX ADMIN — IOHEXOL 150 ML: 350 INJECTION, SOLUTION INTRAVENOUS at 03:01

## 2022-01-18 ENCOUNTER — PROCEDURE VISIT (OUTPATIENT)
Dept: UROLOGY | Facility: CLINIC | Age: 62
End: 2022-01-18
Payer: COMMERCIAL

## 2022-01-18 VITALS
DIASTOLIC BLOOD PRESSURE: 90 MMHG | BODY MASS INDEX: 27.83 KG/M2 | WEIGHT: 240.5 LBS | HEART RATE: 71 BPM | HEIGHT: 78 IN | SYSTOLIC BLOOD PRESSURE: 129 MMHG

## 2022-01-18 DIAGNOSIS — R31.21 ASYMPTOMATIC MICROSCOPIC HEMATURIA: ICD-10-CM

## 2022-01-18 PROCEDURE — 52000 CYSTOURETHROSCOPY: CPT | Mod: S$GLB,,, | Performed by: STUDENT IN AN ORGANIZED HEALTH CARE EDUCATION/TRAINING PROGRAM

## 2022-01-18 PROCEDURE — 52000 PR CYSTOURETHROSCOPY: ICD-10-PCS | Mod: S$GLB,,, | Performed by: STUDENT IN AN ORGANIZED HEALTH CARE EDUCATION/TRAINING PROGRAM

## 2022-01-18 NOTE — PROCEDURES
Procedures   Procedure:   1. Flexible cysto-uretheroscopy    Pre Procedure Diagnosis:  1. Microscopic hematuria    Post Procedure Diagnosis:  1. same    Surgeon: Ana Stevens MD    Anesthesia: 2% uro-jet lidocaine jelly for local analgesia    Procedure note:  A flexible cysto-urethroscopy was performed after consent was obtained.  The risks and benefits were explained. 2% lidocaine urojet was used for local analgesia. The genitalia was prepped and draped in the sterile fashion.     The flexible scope was advanced into the urethra and into the bladder. A urethral stricture was not seen during scope passage.     Once the cystoscope was in the bladder, we systematically surveyed the entire bladder. The bilateral ureteral orifices were identified in their normal orthotopic locations. clear bilateral ureteral efflux was identified.     The bladder was completely surveyed in a systematic fashion. No bladder tumors or lesions were seen.   Upon retroflexion a median lobe was noted to be absent.     As the flexible cystoscope was being withdrawn, the prostate was evaluated carefully. The lateral lobes of the prostate were noted to be moderately enlarged.     The patient tolerated the procedure well without complication.     Findings in summary:  No abnormal bladder mucosa appreciated  Clear efflux of urine from right and left ureteral orifices seen  No urethral stricture  No median lobe, moderately enlarged lateral lobes of prostate      Assessment: 61 y.o. male with microscopic hematuria now completed benign workup     Plan:  1. Cystoscopy benign (see report above)  2. CT urogram - benign urologically.  3. Will CC Dr. Ott - pt completed benign workup. If still microscopic hematuria present in 5 years can consider repeat workup (1/2027). Incidental hiatal hernia which I told patient about but he doesn't have GERD sx.

## 2022-02-01 ENCOUNTER — HOSPITAL ENCOUNTER (OUTPATIENT)
Dept: WOUND CARE | Facility: HOSPITAL | Age: 62
Discharge: HOME | End: 2022-02-01
Attending: PODIATRIST
Payer: COMMERCIAL

## 2022-02-01 VITALS
DIASTOLIC BLOOD PRESSURE: 92 MMHG | BODY MASS INDEX: 27.77 KG/M2 | HEIGHT: 78 IN | TEMPERATURE: 98 F | HEART RATE: 72 BPM | SYSTOLIC BLOOD PRESSURE: 156 MMHG | WEIGHT: 240 LBS

## 2022-02-01 DIAGNOSIS — L97.322 VENOUS STASIS ULCER OF LEFT ANKLE WITH FAT LAYER EXPOSED WITH VARICOSE VEINS: ICD-10-CM

## 2022-02-01 DIAGNOSIS — I83.023 VENOUS STASIS ULCER OF LEFT ANKLE WITH FAT LAYER EXPOSED WITH VARICOSE VEINS: ICD-10-CM

## 2022-02-01 DIAGNOSIS — L03.116 CELLULITIS OF LEFT LOWER EXTREMITY: Primary | ICD-10-CM

## 2022-02-01 PROCEDURE — 11042 DBRDMT SUBQ TIS 1ST 20SQCM/<: CPT | Mod: ,,, | Performed by: PODIATRIST

## 2022-02-01 PROCEDURE — 87070 CULTURE OTHR SPECIMN AEROBIC: CPT | Performed by: PODIATRIST

## 2022-02-01 PROCEDURE — 99204 OFFICE O/P NEW MOD 45 MIN: CPT | Mod: 25

## 2022-02-01 PROCEDURE — 11042 WOUND DEBRIDEMENT: ICD-10-PCS | Mod: ,,, | Performed by: PODIATRIST

## 2022-02-01 PROCEDURE — 99203 OFFICE O/P NEW LOW 30 MIN: CPT | Mod: 25,,, | Performed by: PODIATRIST

## 2022-02-01 PROCEDURE — 27201912 HC WOUND CARE DEBRIDEMENT SUPPLIES

## 2022-02-01 PROCEDURE — 87077 CULTURE AEROBIC IDENTIFY: CPT | Performed by: PODIATRIST

## 2022-02-01 PROCEDURE — 99203 PR OFFICE/OUTPT VISIT, NEW, LEVL III, 30-44 MIN: ICD-10-PCS | Mod: 25,,, | Performed by: PODIATRIST

## 2022-02-01 PROCEDURE — 11042 DBRDMT SUBQ TIS 1ST 20SQCM/<: CPT

## 2022-02-01 PROCEDURE — 87186 SC STD MICRODIL/AGAR DIL: CPT | Performed by: PODIATRIST

## 2022-02-01 NOTE — PROCEDURES
"Wound Debridement    Date/Time: 2/1/2022 1:00 PM  Performed by: Phil Lott DPM  Authorized by: Phil Lott DPM     Time out: Immediately prior to procedure a "time out" was called to verify the correct patient, procedure, equipment, support staff and site/side marked as required.    Consent Done?:  Yes (Written)    Preparation: Patient was prepped and draped in usual sterile fashion    Local anesthesia used?: Yes    Local anesthetic:  Topical anesthetic    Wound Details:    Location:  Left foot    Location:  Left Ankle (lateral)    Type of Debridement:  Excisional       Length (cm):  4.2       Area (sq cm):  9.24       Width (cm):  2.2       Percent Debrided (%):  100       Depth (cm):  0.1       Total Area Debrided (sq cm):  9.24    Depth of debridement:  Subcutaneous tissue    Tissue debrided:  Subcutaneous and Hypergranulation    Devitalized tissue debrided:  Slough, Fibrin and Biofilm    Instruments:  Curette    Bleeding:  Moderate  Hemostasis Achieved: Yes    Method Used:  Pressure  Patient tolerance:  Patient tolerated the procedure well with no immediate complications      "

## 2022-02-01 NOTE — PROGRESS NOTES
Subjective:       Patient ID: Kevan Norris is a 61 y.o. male.    Chief Complaint: Venous Ulcer (Left ankle)    22: 62 y/o male here with venous ulcer of left lateral ankle he has had x 2 months.  Hx of DM, Gout, and PVD. Seen by Dr. Ozuna, vascular 22. Seen today by Dr. Lott. Leann area redness noted. Doppler pulses audible. Culture done. Culture of site done. Hydrofera ready to wound and Co-flex with zinc to LLE toes to knee. Dressing changes 2x weekly.     Past Medical History:   Diagnosis Date    Blood clot in vein     Right Leg,     Diabetes mellitus     Diabetes mellitus, type 2     Gout flare     Hyperlipemia     Prostate atrophy     Varicose veins        Past Surgical History:   Procedure Laterality Date    VARICOSE VEIN SURGERY         Family History   Problem Relation Age of Onset    Diabetes Father     Diabetes Sister        Social History     Socioeconomic History    Marital status: Single   Tobacco Use    Smoking status: Former Smoker     Years: 6.00     Quit date:      Years since quittin.0    Smokeless tobacco: Never Used   Substance and Sexual Activity    Alcohol use: Yes     Comment: drinks beer, 2 cans, about 2x/week    Drug use: No    Sexual activity: Not Currently   Social History Narrative    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 Two sisters, two brothers live nearby. One sister lives in [place]. One dog at home.        Current Outpatient Medications   Medication Sig Dispense Refill    aspirin 81 mg Cap Take by mouth.      cyanocobalamin (VITAMIN B-12) 1000 MCG tablet Take 1 tablet (1,000 mcg total) by mouth once daily. 90 tablet 5    ergocalciferol (ERGOCALCIFEROL) 50,000 unit Cap Take 1 capsule (50,000 Units total) by mouth every 7 days. Then start daily OTC replacement after this Rx is complete 12 capsule 0    fluorouraciL (EFUDEX) 5 % cream Apply thin film to left lower forearm 2times per day for 3 weeks; d/c if  area bleeding or ulcerated; avoid eyes or mouth 40 g 1    FLUZONE QUAD 1600-6055, PF, 60 mcg (15 mcg x 4)/0.5 mL Syrg       gentamicin (GARAMYCIN) 0.1 % ointment Apply topically.      losartan (COZAAR) 50 MG tablet Take 1 tablet (50 mg total) by mouth once daily. 90 tablet 1    rosuvastatin (CRESTOR) 5 MG tablet Take 1 tablet (5 mg total) by mouth once daily. 90 tablet 3     No current facility-administered medications for this encounter.       Review of patient's allergies indicates:  No Known Allergies    Review of Systems   Constitutional: Negative for chills and fever.   HENT: Negative for congestion and hearing loss.    Respiratory: Negative for cough and shortness of breath.    Cardiovascular: Positive for leg swelling. Negative for chest pain.   Gastrointestinal: Negative for nausea and vomiting.   Skin: Positive for color change and wound.   Neurological: Negative for dizziness and numbness.   Psychiatric/Behavioral: Negative for agitation.         Objective:      Temp:  [98.1 °F (36.7 °C)]   Pulse:  [72]   BP: (156)/(92)   Physical Exam  Constitutional:       General: He is not in acute distress.     Appearance: He is not ill-appearing.   Cardiovascular:      Pulses:           Dorsalis pedis pulses are detected w/ Doppler on the left side.        Posterior tibial pulses are detected w/ Doppler on the left side.      Comments: Bilateral lower extremity edema L>R with hemosiderin staining of the legs, + varicosities. No hair growth bilateral lower extremity.   Musculoskeletal:      Comments: Localized pain on palpation of wound site lateral left ankle. No palpable fluctuance or crepitance.    Skin:     General: Skin is warm.      Capillary Refill: Capillary refill takes 2 to 3 seconds.      Findings: Erythema present. No ecchymosis.      Nails: There is no clubbing.      Comments: Refer to wound description below     Neurological:      Mental Status: He is alert.            Wound 02/01/22 1300 Venous  Ulcer Left lateral Malleolus/Ankle (Active)   02/01/22 1300    Pre-existing: Yes   Primary Wound Type: Venous ulcer   Side: Left   Orientation: lateral   Location: Malleolus/Ankle   Wound Number:    Ankle-Brachial Index:    Pulses:    Removal Indication and Assessment:    Wound Outcome:    (Retired) Wound Type:    (Retired) Wound Length (cm):    (Retired) Wound Width (cm):    (Retired) Depth (cm):    Wound Description (Comments):    Removal Indications:    Wound Image   02/01/22 1300   Dressing Appearance Intact;Moist drainage 02/01/22 1300   Drainage Amount Moderate 02/01/22 1300   Drainage Characteristics/Odor Serosanguineous 02/01/22 1300   Appearance Red;Yellow;Moist 02/01/22 1300   Tissue loss description Full thickness 02/01/22 1300   Red (%), Wound Tissue Color 30 % 02/01/22 1300   Yellow (%), Wound Tissue Color 70 % 02/01/22 1300   Periwound Area Redness;Edematous 02/01/22 1300   Wound Edges Irregular 02/01/22 1300   Wound Length (cm) 4.3 cm 02/01/22 1300   Wound Width (cm) 4 cm 02/01/22 1300   Wound Depth (cm) 0.1 cm 02/01/22 1300   Wound Volume (cm^3) 1.72 cm^3 02/01/22 1300   Wound Surface Area (cm^2) 17.2 cm^2 02/01/22 1300   Care Cleansed with:;Sterile normal saline;Debrided 02/01/22 1300   Dressing Applied;Hydrofiber;Absorptive Pad;Compression wrap 02/01/22 1300   Periwound Care Topical treatment applied 02/01/22 1300   Compression Two layer compression 02/01/22 1300   Dressing Change Due 02/04/22 02/01/22 1300         Assessment:         ICD-10-CM ICD-9-CM   1. Cellulitis of left lower extremity  L03.116 682.6   2. Venous stasis ulcer of left ankle with fat layer exposed with varicose veins  I83.023 454.0    L97.322          Plan:   Tissue pathology and/or culture taken:  [x] Yes [] No   Sharp debridement performed:   [x] Yes [] No   Labs ordered this visit:   [] Yes [x] No   Imaging ordered this visit:   [] Yes [x] No           Orders Placed This Encounter   Procedures    Debridement     This order  was created via procedure documentation     Standing Status:   Standing     Number of Occurrences:   1    Aerobic culture (Specify Source)    Ambulatory referral/consult to Wound Clinic     Standing Status:   Standing     Number of Occurrences:   1     Referral Priority:   Routine     Referral Type:   Consultation     Referral Reason:   Specialty Services Required     Requested Specialty:   Wound Care     Number of Visits Requested:   1    Change dressing     Left lateral ankle venous ulcer:  Cleanse with: Normal saline  Lidocaine: prn  Silver nitrate: prn  Periwound care: Betamethasone and antifungal cream  Primary dressing: Hydrofera ready, small ABD  Secondary dressing: Co-flex with zinc LLE toes to knee  Edema control: LLE Co-flex with zinc toes to knee  Frequency: Tuesday and Friday  Follow-up: Dr. Lott 2/8/22    Other Orders: Culture done 2/1/22.        Follow up in about 1 week (around 2/8/2022).        Wound c&s post debridement. Wound debridement and dressing per attached note.    Elevation at rest. Discussed modifying activities, however patient unable to stop working at present time.     RTC prn as discussed.

## 2022-02-04 ENCOUNTER — HOSPITAL ENCOUNTER (OUTPATIENT)
Dept: WOUND CARE | Facility: HOSPITAL | Age: 62
Discharge: HOME | End: 2022-02-04
Attending: PODIATRIST
Payer: COMMERCIAL

## 2022-02-04 ENCOUNTER — TELEPHONE (OUTPATIENT)
Dept: PODIATRY | Facility: CLINIC | Age: 62
End: 2022-02-04
Payer: COMMERCIAL

## 2022-02-04 DIAGNOSIS — E11.9 DIABETES MELLITUS WITHOUT COMPLICATION: ICD-10-CM

## 2022-02-04 LAB — BACTERIA SPEC AEROBE CULT: ABNORMAL

## 2022-02-04 PROCEDURE — 29581 APPL MULTLAYER CMPRN SYS LEG: CPT

## 2022-02-04 RX ORDER — CIPROFLOXACIN 500 MG/1
500 TABLET ORAL 2 TIMES DAILY
Qty: 20 TABLET | Refills: 0 | Status: SHIPPED | OUTPATIENT
Start: 2022-02-04 | End: 2022-04-12 | Stop reason: ALTCHOICE

## 2022-02-04 NOTE — PROGRESS NOTES
Subjective:       Patient ID: Kevan Norris is a 61 y.o. male.    Chief Complaint: Venous Stasis and Non-healing Wound Follow Up    2/4/22 Patient was seen for dressing changes to LLE.Tolerated dressing changes and compression application well.    Review of Systems      Objective:         Physical Exam       Wound 02/01/22 1300 Venous Ulcer Left lateral Malleolus/Ankle (Active)   02/01/22 1300    Pre-existing: Yes   Primary Wound Type: Venous ulcer   Side: Left   Orientation: lateral   Location: Malleolus/Ankle   Wound Number:    Ankle-Brachial Index:    Pulses:    Removal Indication and Assessment:    Wound Outcome:    (Retired) Wound Type:    (Retired) Wound Length (cm):    (Retired) Wound Width (cm):    (Retired) Depth (cm):    Wound Description (Comments):    Removal Indications:    Dressing Appearance Intact;Moist drainage 02/04/22 1455   Drainage Amount Moderate 02/04/22 1455   Drainage Characteristics/Odor Serosanguineous 02/04/22 1455   Tissue loss description Full thickness 02/04/22 1455   Red (%), Wound Tissue Color 100 % 02/04/22 1455   Periwound Area Intact;Redness;Hemosiderin Staining 02/04/22 1455   Wound Edges Defined;Open 02/04/22 1455   Wound Length (cm) 4.3 cm 02/04/22 1455   Wound Width (cm) 4 cm 02/04/22 1455   Wound Depth (cm) 0.1 cm 02/04/22 1455   Wound Volume (cm^3) 1.72 cm^3 02/04/22 1455   Wound Surface Area (cm^2) 17.2 cm^2 02/04/22 1455   Care Soap and water;Sterile normal saline 02/04/22 1455   Dressing Applied;Hydrofiber 02/04/22 1455   Periwound Care Topical treatment applied 02/04/22 1455   Compression Two layer compression 02/04/22 1455   Dressing Change Due 02/08/22 02/04/22 1455         Assessment:       No diagnosis found.      Plan:   Tissue pathology and/or culture taken:  [] Yes [x] No   Sharp debridement performed:   [] Yes [x] No   Labs ordered this visit:   [] Yes [x] No   Imaging ordered this visit:   [] Yes [x] No     Left lateral ankle venous ulcer:   Cleanse  with: Normal saline   Lidocaine: prn   Silver nitrate: prn   Periwound care: Betamethasone and antifungal cream   Primary dressing: Hydrofera ready, small ABD   Secondary dressing: Co-flex with zinc LLE toes to knee   Edema control: LLE Co-flex with zinc toes to knee   Frequency: Tuesday and Friday   Follow-up: Dr. Lott 2/8/22     Other Orders: Culture done 2/1/22.      No orders of the defined types were placed in this encounter.       No follow-ups on file.

## 2022-02-04 NOTE — TELEPHONE ENCOUNTER
Please notify patient that I prescribed ciprofloxacin to help treat the bacteria growing in his wound. He should take a probiotic with it daily.

## 2022-02-08 ENCOUNTER — HOSPITAL ENCOUNTER (OUTPATIENT)
Dept: WOUND CARE | Facility: HOSPITAL | Age: 62
Discharge: HOME | End: 2022-02-08
Attending: PODIATRIST
Payer: COMMERCIAL

## 2022-02-08 DIAGNOSIS — R60.0 VENOUS STASIS ULCER OF LEFT LOWER LEG WITH EDEMA OF LEFT LOWER LEG: Primary | ICD-10-CM

## 2022-02-08 DIAGNOSIS — I83.029 VENOUS STASIS ULCER OF LEFT LOWER LEG WITH EDEMA OF LEFT LOWER LEG: Primary | ICD-10-CM

## 2022-02-08 DIAGNOSIS — L97.929 VENOUS STASIS ULCER OF LEFT LOWER LEG WITH EDEMA OF LEFT LOWER LEG: Primary | ICD-10-CM

## 2022-02-08 DIAGNOSIS — I83.892 VENOUS STASIS ULCER OF LEFT LOWER LEG WITH EDEMA OF LEFT LOWER LEG: Primary | ICD-10-CM

## 2022-02-08 PROCEDURE — 11042 WOUND DEBRIDEMENT: ICD-10-PCS | Mod: ,,, | Performed by: PODIATRIST

## 2022-02-08 PROCEDURE — 11720 DEBRIDE NAIL 1-5: CPT

## 2022-02-08 PROCEDURE — 27201912 HC WOUND CARE DEBRIDEMENT SUPPLIES

## 2022-02-08 PROCEDURE — 11720 ROUTINE FOOT CARE: ICD-10-PCS | Mod: 59,,, | Performed by: PODIATRIST

## 2022-02-08 PROCEDURE — 11042 DBRDMT SUBQ TIS 1ST 20SQCM/<: CPT

## 2022-02-08 PROCEDURE — 11042 DBRDMT SUBQ TIS 1ST 20SQCM/<: CPT | Mod: ,,, | Performed by: PODIATRIST

## 2022-02-08 PROCEDURE — 11720 DEBRIDE NAIL 1-5: CPT | Mod: 59,,, | Performed by: PODIATRIST

## 2022-02-08 NOTE — PROCEDURES
"Wound Debridement    Date/Time: 2/8/2022 9:39 AM  Performed by: Phil Lott DPM  Authorized by: Phil Lott DPM     Time out: Immediately prior to procedure a "time out" was called to verify the correct patient, procedure, equipment, support staff and site/side marked as required.    Consent Done?:  Yes (Written)    Preparation: Patient was prepped and draped in usual sterile fashion    Local anesthesia used?: Yes    Local anesthetic:  Topical anesthetic    Wound Details:    Location:  Left leg (distal anterior lateral leg)    Type of Debridement:  Excisional       Length (cm):  3.6       Area (sq cm):  10.8       Width (cm):  3       Percent Debrided (%):  100       Depth (cm):  0.2       Total Area Debrided (sq cm):  10.8    Depth of debridement:  Subcutaneous tissue    Tissue debrided:  Subcutaneous    Devitalized tissue debrided:  Slough and Fibrin    Instruments:  Curette    2nd Wound Details:     Location:  Left leg (distal lateral posterior leg)    Type of Debridement:  Excisional       Length (cm):  1.6       Area (sq cm):  3.04       Width (cm):  1.9       Percent Debrided (%):  100       Depth (cm):  0.1       Total Area Debrided (sq cm):  3.04    Depth of debridement:  Subcutaneous tissue    Tissue debrided:  Subcutaneous    Devitalized tissue debrided:  Slough and Fibrin    Bleeding:  Minimal  Hemostasis Achieved: Yes    Method Used:  Pressure and Silver Nitrate  Patient tolerance:  Patient tolerated the procedure well with no immediate complications      "

## 2022-02-08 NOTE — PROCEDURES
"Routine Foot Care    Date/Time: 2/8/2022 9:39 AM  Performed by: Phil Lott DPM  Authorized by: Phil Lott DPM     Time out: Immediately prior to procedure a "time out" was called to verify the correct patient, procedure, equipment, support staff and site/side marked as required.    Consent Done?:  Yes (Written)  Hyperkeratotic Skin Lesions?: No      Nail Care Type:  Debride(Left 1st Toe, Left 2nd Toe, Left 3rd Toe, Left 4th Toe and Left 5th Toe)  Patient tolerance:  Patient tolerated the procedure well with no immediate complications     Used sterile nail nipper.        "

## 2022-02-11 ENCOUNTER — HOSPITAL ENCOUNTER (OUTPATIENT)
Dept: WOUND CARE | Facility: HOSPITAL | Age: 62
Discharge: HOME | End: 2022-02-11
Attending: PODIATRIST
Payer: COMMERCIAL

## 2022-02-11 DIAGNOSIS — E11.9 DIABETES MELLITUS WITHOUT COMPLICATION: ICD-10-CM

## 2022-02-11 PROCEDURE — 29581 APPL MULTLAYER CMPRN SYS LEG: CPT

## 2022-02-11 NOTE — PROGRESS NOTES
Subjective:       Patient ID: Kevan Norris is a 61 y.o. male.    Chief Complaint: Venous Stasis (Left lower leg ulcer)    02/11/2022 : Pt. Visited Wound Clinic for Nurse visit. Left lower leg wound dressing changed per orders. No complaints or concerns presented. Patient to  follow up with Dr. Lott on Tuesday 2/22/2022.     Review of Systems      Objective:         Physical Exam       Wound 02/08/22 1000 posterior;lateral Malleolus/Ankle #2 (Active)   02/08/22 1000    Pre-existing:    Primary Wound Type:    Side:    Orientation: posterior;lateral   Location: Malleolus/Ankle   Wound Number: #2   Ankle-Brachial Index:    Pulses:    Removal Indication and Assessment:    Wound Outcome:    (Retired) Wound Type:    (Retired) Wound Length (cm):    (Retired) Wound Width (cm):    (Retired) Depth (cm):    Wound Description (Comments):    Removal Indications:    Dressing Appearance Intact;Moist drainage 02/11/22 0800   Drainage Amount Small 02/11/22 0800   Drainage Characteristics/Odor Serosanguineous 02/11/22 0800   Appearance Pink;Red 02/11/22 0800   Tissue loss description Partial thickness 02/11/22 0800   Red (%), Wound Tissue Color 100 % 02/11/22 0800   Periwound Area Intact 02/11/22 0800   Wound Edges Irregular 02/11/22 0800   Wound Length (cm) 1.6 cm 02/11/22 0800   Wound Width (cm) 1.9 cm 02/11/22 0800   Wound Depth (cm) 0.1 cm 02/11/22 0800   Wound Volume (cm^3) 0.304 cm^3 02/11/22 0800   Wound Surface Area (cm^2) 3.04 cm^2 02/11/22 0800   Care Cleansed with:;Sterile normal saline 02/11/22 0800   Dressing Applied;Absorptive Pad 02/11/22 0800   Periwound Care Topical treatment applied 02/11/22 0800   Compression Two layer compression 02/11/22 0800   Dressing Change Due 02/15/22 02/11/22 0800         Assessment:       No diagnosis found.      Plan:   Tissue pathology and/or culture taken:  [] Yes [x] No   Sharp debridement performed:   [] Yes [x] No   Labs ordered this visit:   [] Yes [x] No   Imaging ordered  this visit:   [] Yes [x] No   Left lateral ankle venous ulcer:   Cleanse with: Normal saline   Lidocaine: prn   Silver nitrate: prn   Periwound care: Betamethasone and antifungal cream   Primary dressing: Hydrofera ready, small ABD   Secondary dressing: Co-flex with zinc LLE toes to knee   Edema control: LLE Co-flex with zinc toes to knee   Frequency: Tuesday and Friday   Follow-up: Dr. Lott 2/22/22        No orders of the defined types were placed in this encounter.       No follow-ups on file.

## 2022-02-14 NOTE — PROGRESS NOTES
Subjective:       Patient ID: Kevan Norris is a 61 y.o. male.    Chief Complaint: Wound Care    Patient to wound care center for LLE wound, progressing well. Continue with the same treatment plan.    Review of Systems      Objective:         Physical Exam       Wound 02/01/22 1300 Venous Ulcer Left lateral;anterior Malleolus/Ankle (Active)   02/01/22 1300    Pre-existing: Yes   Primary Wound Type: Venous ulcer   Side: Left   Orientation: lateral;anterior   Location: Malleolus/Ankle   Wound Number:    Ankle-Brachial Index:    Pulses:    Removal Indication and Assessment:    Wound Outcome:    (Retired) Wound Type:    (Retired) Wound Length (cm):    (Retired) Wound Width (cm):    (Retired) Depth (cm):    Wound Description (Comments):    Removal Indications:    Wound Image   02/08/22 1000   Dressing Appearance Intact;Moist drainage 02/08/22 1000   Drainage Amount Moderate 02/08/22 1000   Drainage Characteristics/Odor Serosanguineous 02/08/22 1000   Appearance Intact;Pink;Red 02/08/22 1000   Tissue loss description Full thickness 02/08/22 1000   Red (%), Wound Tissue Color 100 % 02/08/22 1000   Periwound Area Intact;Redness;Hemosiderin Staining 02/08/22 1000   Wound Edges Defined 02/08/22 1000   Wound Length (cm) 3.6 cm 02/08/22 1000   Wound Width (cm) 3 cm 02/08/22 1000   Wound Depth (cm) 0.2 cm 02/08/22 1000   Wound Volume (cm^3) 2.16 cm^3 02/08/22 1000   Wound Surface Area (cm^2) 10.8 cm^2 02/08/22 1000   Care Cleansed with:;Soap and water;Sterile normal saline 02/08/22 1000   Dressing Applied;Changed;Hydrofiber;Compression wrap 02/08/22 1000   Periwound Care Topical treatment applied 02/08/22 1000   Compression Two layer compression 02/08/22 1000   Dressing Change Due 02/15/22 02/08/22 1000            Wound 02/08/22 1000 posterior;lateral Malleolus/Ankle #2 (Active)   02/08/22 1000    Pre-existing:    Primary Wound Type:    Side:    Orientation: posterior;lateral   Location: Malleolus/Ankle   Wound Number:  #2   Ankle-Brachial Index:    Pulses:    Removal Indication and Assessment:    Wound Outcome:    (Retired) Wound Type:    (Retired) Wound Length (cm):    (Retired) Wound Width (cm):    (Retired) Depth (cm):    Wound Description (Comments):    Removal Indications:    Dressing Appearance Intact;Moist drainage 02/08/22 1000   Drainage Amount Small 02/08/22 1000   Drainage Characteristics/Odor Serosanguineous 02/08/22 1000   Appearance Pink;Red 02/08/22 1000   Tissue loss description Partial thickness 02/08/22 1000   Red (%), Wound Tissue Color 100 % 02/08/22 1000   Periwound Area Intact 02/08/22 1000   Wound Edges Irregular 02/08/22 1000   Wound Length (cm) 1.6 cm 02/08/22 1000   Wound Width (cm) 1.9 cm 02/08/22 1000   Wound Depth (cm) 0.1 cm 02/08/22 1000   Wound Volume (cm^3) 0.304 cm^3 02/08/22 1000   Wound Surface Area (cm^2) 3.04 cm^2 02/08/22 1000   Care Cleansed with:;Soap and water;Sterile normal saline 02/08/22 1000   Dressing Applied;Changed;Hydrofiber;Compression wrap 02/08/22 1000   Periwound Care Topical treatment applied 02/08/22 1000   Compression Two layer compression 02/08/22 1000   Dressing Change Due 02/15/22 02/08/22 1000         Assessment:         ICD-10-CM ICD-9-CM   1. Venous stasis ulcer of left lower leg with edema of left lower leg  I83.029 454.8    I83.892 454.0    L97.929     R60.9          Plan:   Tissue pathology and/or culture taken:  [] Yes [x] No   Sharp debridement performed:   [x] Yes [] No   Labs ordered this visit:   [] Yes [x] No   Imaging ordered this visit:   [] Yes [x] No           Orders Placed This Encounter   Procedures    Debridement     This order was created via procedure documentation     Standing Status:   Standing     Number of Occurrences:   1    Foot Care     This order was created via procedure documentation     Standing Status:   Standing     Number of Occurrences:   1    Change dressing     Left lateral ankle venous ulcer:   Cleanse with: Normal saline    Lidocaine: prn   Silver nitrate: prn   Periwound care: Betamethasone and antifungal cream   Primary dressing: Hydrofera ready, small ABD   Secondary dressing: Co-flex with zinc LLE toes to knee   Edema control: LLE Co-flex with zinc toes to knee   Frequency: Tuesday and Friday   Follow-up: Dr. Lott 2/22/22        Follow up in about 1 week (around 2/15/2022).

## 2022-02-15 ENCOUNTER — HOSPITAL ENCOUNTER (OUTPATIENT)
Dept: WOUND CARE | Facility: HOSPITAL | Age: 62
Discharge: HOME | End: 2022-02-15
Attending: PODIATRIST
Payer: COMMERCIAL

## 2022-02-15 VITALS
DIASTOLIC BLOOD PRESSURE: 92 MMHG | HEIGHT: 78 IN | HEART RATE: 69 BPM | WEIGHT: 240 LBS | SYSTOLIC BLOOD PRESSURE: 158 MMHG | BODY MASS INDEX: 27.77 KG/M2 | TEMPERATURE: 98 F

## 2022-02-15 DIAGNOSIS — L97.322 VENOUS STASIS ULCER OF LEFT ANKLE WITH FAT LAYER EXPOSED WITH VARICOSE VEINS: ICD-10-CM

## 2022-02-15 DIAGNOSIS — E11.9 DIABETES MELLITUS WITHOUT COMPLICATION: Primary | ICD-10-CM

## 2022-02-15 DIAGNOSIS — I83.023 VENOUS STASIS ULCER OF LEFT ANKLE WITH FAT LAYER EXPOSED WITH VARICOSE VEINS: ICD-10-CM

## 2022-02-15 PROCEDURE — 11042 WOUND DEBRIDEMENT: ICD-10-PCS | Mod: ,,, | Performed by: PODIATRIST

## 2022-02-15 PROCEDURE — 11042 DBRDMT SUBQ TIS 1ST 20SQCM/<: CPT

## 2022-02-15 PROCEDURE — 27201912 HC WOUND CARE DEBRIDEMENT SUPPLIES

## 2022-02-15 PROCEDURE — 11042 DBRDMT SUBQ TIS 1ST 20SQCM/<: CPT | Mod: ,,, | Performed by: PODIATRIST

## 2022-02-15 NOTE — PROGRESS NOTES
Subjective:       Patient ID: Kevan Norris is a 61 y.o. male.    Chief Complaint: Non-healing Wound Follow Up    2/15/22 Patient was seen in clinic for eval and treatment of left lateral ulcer.  Decreased in wound size appreciated.  Debridement was done.  Tolerated procedure and dressing changes well.    Past Medical History:   Diagnosis Date    Blood clot in vein     Right Leg,     Diabetes mellitus     Diabetes mellitus, type 2     Gout flare     Hyperlipemia     Prostate atrophy     Varicose veins        Past Surgical History:   Procedure Laterality Date    VARICOSE VEIN SURGERY         Family History   Problem Relation Age of Onset    Diabetes Father     Diabetes Sister        Social History     Socioeconomic History    Marital status: Single   Tobacco Use    Smoking status: Former Smoker     Years: 6.00     Quit date:      Years since quittin.1    Smokeless tobacco: Never Used   Substance and Sexual Activity    Alcohol use: Yes     Comment: drinks beer, 2 cans, about 2x/week    Drug use: No    Sexual activity: Not Currently   Social History Narrative    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 Two sisters, two brothers live nearby. One sister lives in [place]. One dog at home.        Current Outpatient Medications   Medication Sig Dispense Refill    aspirin 81 mg Cap Take by mouth.      ciprofloxacin HCl (CIPRO) 500 MG tablet Take 1 tablet (500 mg total) by mouth 2 (two) times daily. 20 tablet 0    cyanocobalamin (VITAMIN B-12) 1000 MCG tablet Take 1 tablet (1,000 mcg total) by mouth once daily. 90 tablet 5    ergocalciferol (ERGOCALCIFEROL) 50,000 unit Cap Take 1 capsule (50,000 Units total) by mouth every 7 days. Then start daily OTC replacement after this Rx is complete 12 capsule 0    fluorouraciL (EFUDEX) 5 % cream Apply thin film to left lower forearm 2times per day for 3 weeks; d/c if area bleeding or ulcerated; avoid eyes or mouth 40 g 1     FLUZONE QUAD 9684-2278, PF, 60 mcg (15 mcg x 4)/0.5 mL Syrg       gentamicin (GARAMYCIN) 0.1 % ointment Apply topically.      losartan (COZAAR) 50 MG tablet Take 1 tablet (50 mg total) by mouth once daily. 90 tablet 1    rosuvastatin (CRESTOR) 5 MG tablet Take 1 tablet (5 mg total) by mouth once daily. 90 tablet 3     No current facility-administered medications for this encounter.       Review of patient's allergies indicates:  No Known Allergies    Review of Systems   Constitutional: Negative for chills and fever.   HENT: Negative for congestion and hearing loss.    Respiratory: Negative for cough and shortness of breath.    Cardiovascular: Positive for leg swelling. Negative for chest pain.   Gastrointestinal: Negative for nausea and vomiting.   Skin: Positive for color change and wound.   Neurological: Negative for dizziness and numbness.   Psychiatric/Behavioral: Negative for agitation.         Objective:      Temp:  [98.1 °F (36.7 °C)]   Pulse:  [69]   BP: (158)/(92)   Physical Exam  Constitutional:       General: He is not in acute distress.     Appearance: He is not ill-appearing.   Cardiovascular:      Pulses:           Dorsalis pedis pulses are detected w/ Doppler on the left side.        Posterior tibial pulses are detected w/ Doppler on the left side.      Comments: Bilateral lower extremity edema L>R with hemosiderin staining of the legs, + varicosities. No hair growth bilateral lower extremity.   Musculoskeletal:      Comments: Localized pain on palpation of wound site lateral left ankle. No palpable fluctuance or crepitance.    Skin:     General: Skin is warm.      Capillary Refill: Capillary refill takes 2 to 3 seconds.      Findings: No ecchymosis or erythema.      Nails: There is no clubbing.      Comments: Refer to wound description below     Neurological:      Mental Status: He is alert.            Wound 02/01/22 1300 Venous Ulcer Left lateral;anterior Malleolus/Ankle (Active)   02/01/22  1300    Pre-existing: Yes   Primary Wound Type: Venous ulcer   Side: Left   Orientation: lateral;anterior   Location: Malleolus/Ankle   Wound Number:    Ankle-Brachial Index:    Pulses:    Removal Indication and Assessment:    Wound Outcome:    (Retired) Wound Type:    (Retired) Wound Length (cm):    (Retired) Wound Width (cm):    (Retired) Depth (cm):    Wound Description (Comments):    Removal Indications:    Wound Image   02/15/22 1500   Dressing Appearance Intact;Moist drainage 02/15/22 1500   Drainage Amount Moderate 02/15/22 1500   Drainage Characteristics/Odor Serosanguineous 02/15/22 1500   Tissue loss description Full thickness 02/15/22 1500   Red (%), Wound Tissue Color 90 % 02/15/22 1500   Yellow (%), Wound Tissue Color 10 % 02/15/22 1500   Periwound Area Intact;Dry;Petechiae;Satellite lesion 02/15/22 1500   Wound Edges Defined;Irregular 02/15/22 1500   Wound Length (cm) 3.1 cm 02/15/22 1500   Wound Width (cm) 2.6 cm 02/15/22 1500   Wound Depth (cm) 0.1 cm 02/15/22 1500   Wound Volume (cm^3) 0.806 cm^3 02/15/22 1500   Wound Surface Area (cm^2) 8.06 cm^2 02/15/22 1500   Care Cleansed with:;Soap and water;Sterile normal saline;Debrided 02/15/22 1500   Dressing Applied;Hydrofiber;Absorptive Pad 02/15/22 1500   Periwound Care Topical treatment applied 02/15/22 1500   Compression Two layer compression 02/15/22 1500   Dressing Change Due 02/18/22 02/15/22 1500         Assessment:         ICD-10-CM ICD-9-CM   1. Diabetes mellitus without complication  E11.9 250.00   2. Venous stasis ulcer of left ankle with fat layer exposed with varicose veins  I83.023 454.0    L97.322          Plan:   Tissue pathology and/or culture taken:  [] Yes [x] No   Sharp debridement performed:   [x] Yes [] No   Labs ordered this visit:   [] Yes [x] No   Imaging ordered this visit:   [] Yes [x] No           Orders Placed This Encounter   Procedures    Debridement     This order was created via procedure documentation     Standing  Status:   Standing     Number of Occurrences:   1    Change dressing     Left lateral ankle venous ulcer:     Cleanse with: Normal saline   Lidocaine: prn   Silver nitrate: prn   Periwound care: Betamethasone and antifungal cream   Primary dressing: Hydrofera ready, small ABD or Mextra  Secondary dressing: Co-flex with zinc LLE toes to knee   Edema control: LLE Co-flex with zinc toes to knee   Frequency: Tuesday and Friday   Follow-up: Dr. Lott 3/8/22+ nurse visits 2 x week        Follow up in about 3 weeks (around 3/8/2022) for Dr Lott.        Wound debridement and dressing per attached note.    Local wound care per above.    Continue activity restrictions and modifications    Elevation at rest.

## 2022-02-16 NOTE — PROCEDURES
"Wound Debridement    Date/Time: 2/15/2022 2:28 PM  Performed by: Phil Lott DPM  Authorized by: Phil Lott DPM     Time out: Immediately prior to procedure a "time out" was called to verify the correct patient, procedure, equipment, support staff and site/side marked as required.    Consent Done?:  Yes (Written)    Preparation: Patient was prepped and draped in usual sterile fashion    Local anesthesia used?: Yes    Local anesthetic:  Topical anesthetic    Wound Details:    Location:  Left foot    Location:  Left Ankle (lateral)    Type of Debridement:  Excisional       Length (cm):  3.5       Area (sq cm):  9.45       Width (cm):  2.7       Percent Debrided (%):  100       Depth (cm):  0.1       Total Area Debrided (sq cm):  9.45    Depth of debridement:  Subcutaneous tissue    Tissue debrided:  Subcutaneous    Devitalized tissue debrided:  Fibrin    Instruments:  Curette    Bleeding:  Minimal  Hemostasis Achieved: Yes    Method Used:  Pressure  Patient tolerance:  Patient tolerated the procedure well with no immediate complications      "

## 2022-02-21 ENCOUNTER — HOSPITAL ENCOUNTER (OUTPATIENT)
Dept: WOUND CARE | Facility: HOSPITAL | Age: 62
Discharge: HOME | End: 2022-02-21
Attending: PODIATRIST
Payer: COMMERCIAL

## 2022-02-21 DIAGNOSIS — I83.023 VENOUS STASIS ULCER OF LEFT ANKLE WITH FAT LAYER EXPOSED WITH VARICOSE VEINS: Primary | ICD-10-CM

## 2022-02-21 DIAGNOSIS — L97.322 VENOUS STASIS ULCER OF LEFT ANKLE WITH FAT LAYER EXPOSED WITH VARICOSE VEINS: Primary | ICD-10-CM

## 2022-02-21 PROCEDURE — 29581 APPL MULTLAYER CMPRN SYS LEG: CPT

## 2022-02-21 NOTE — PROGRESS NOTES
Subjective:       Patient ID: Kevan Norris is a 61 y.o. male.    Chief Complaint: Venous Ulcer (Left ankle)    2/21/22: Nurse visit for dressing change.    Review of Systems      Objective:         Physical Exam       Wound 02/01/22 1300 Venous Ulcer Left lateral;anterior Malleolus/Ankle (Active)   02/01/22 1300    Pre-existing: Yes   Primary Wound Type: Venous ulcer   Side: Left   Orientation: lateral;anterior   Location: Malleolus/Ankle   Wound Number:    Ankle-Brachial Index:    Pulses:    Removal Indication and Assessment:    Wound Outcome:    (Retired) Wound Type:    (Retired) Wound Length (cm):    (Retired) Wound Width (cm):    (Retired) Depth (cm):    Wound Description (Comments):    Removal Indications:    Dressing Appearance Intact;Moist drainage 02/21/22 1000   Drainage Amount Small 02/21/22 1000   Drainage Characteristics/Odor Serosanguineous 02/21/22 1000   Appearance Red;Moist 02/21/22 1000   Tissue loss description Full thickness 02/21/22 1000   Red (%), Wound Tissue Color 100 % 02/21/22 1000   Periwound Area Intact;Dry 02/21/22 1000   Wound Edges Irregular 02/21/22 1000   Wound Length (cm) 3.1 cm 02/21/22 1000   Wound Width (cm) 2.6 cm 02/21/22 1000   Wound Depth (cm) 0.1 cm 02/21/22 1000   Wound Volume (cm^3) 0.806 cm^3 02/21/22 1000   Wound Surface Area (cm^2) 8.06 cm^2 02/21/22 1000   Care Cleansed with:;Sterile normal saline 02/21/22 1000   Dressing Applied;Hydrofiber;Absorptive Pad;Compression wrap 02/21/22 1000   Periwound Care Skin barrier film applied 02/21/22 1000   Compression Two layer compression 02/21/22 1000   Dressing Change Due 03/02/22 02/21/22 1000            Wound 02/08/22 1000 posterior;lateral Malleolus/Ankle #2 (Active)   02/08/22 1000    Pre-existing:    Primary Wound Type:    Side:    Orientation: posterior;lateral   Location: Malleolus/Ankle   Wound Number: #2   Ankle-Brachial Index:    Pulses:    Removal Indication and Assessment:    Wound Outcome:    (Retired) Wound  Type:    (Retired) Wound Length (cm):    (Retired) Wound Width (cm):    (Retired) Depth (cm):    Wound Description (Comments):    Removal Indications:    Dressing Appearance Intact;Moist drainage 02/21/22 1000   Drainage Amount Small 02/21/22 1000   Drainage Characteristics/Odor Serosanguineous 02/21/22 1000   Appearance Red;Moist 02/21/22 1000   Tissue loss description Partial thickness 02/21/22 1000   Red (%), Wound Tissue Color 100 % 02/21/22 1000   Periwound Area Intact 02/21/22 1000   Wound Edges Irregular 02/21/22 1000   Wound Length (cm) 1.6 cm 02/21/22 1000   Wound Width (cm) 1.9 cm 02/21/22 1000   Wound Depth (cm) 0.1 cm 02/21/22 1000   Wound Volume (cm^3) 0.304 cm^3 02/21/22 1000   Wound Surface Area (cm^2) 3.04 cm^2 02/21/22 1000   Care Cleansed with:;Sterile normal saline 02/21/22 1000   Dressing Applied;Hydrofiber;Absorptive Pad;Compression wrap 02/21/22 1000   Periwound Care Skin barrier film applied 02/21/22 1000   Compression Two layer compression 02/21/22 1000   Dressing Change Due 03/02/22 02/21/22 1000         Assessment:         ICD-10-CM ICD-9-CM   1. Venous stasis ulcer of left ankle with fat layer exposed with varicose veins  I83.023 454.0    L97.322          Plan:   Tissue pathology and/or culture taken:  [] Yes [x] No   Sharp debridement performed:   [] Yes [x] No   Labs ordered this visit:   [] Yes [x] No   Imaging ordered this visit:   [] Yes [x] No     Left lateral ankle venous ulcer:     Cleanse with: Normal saline   Lidocaine: prn   Silver nitrate: prn   Periwound care: Betamethasone and antifungal cream   Primary dressing: Hydrofera ready, small ABD or Mextra   Secondary dressing: Co-flex with zinc LLE toes to knee   Edema control: LLE Co-flex with zinc toes to knee   Frequency: Weekly  Follow-up: Dr. Lott 3/8/22      No orders of the defined types were placed in this encounter.       Follow up in about 9 days (around 3/2/2022).

## 2022-03-02 ENCOUNTER — HOSPITAL ENCOUNTER (OUTPATIENT)
Dept: WOUND CARE | Facility: HOSPITAL | Age: 62
Discharge: HOME | End: 2022-03-02
Attending: PODIATRIST
Payer: COMMERCIAL

## 2022-03-02 DIAGNOSIS — L97.322 VENOUS STASIS ULCER OF LEFT ANKLE WITH FAT LAYER EXPOSED WITH VARICOSE VEINS: Primary | ICD-10-CM

## 2022-03-02 DIAGNOSIS — I83.023 VENOUS STASIS ULCER OF LEFT ANKLE WITH FAT LAYER EXPOSED WITH VARICOSE VEINS: Primary | ICD-10-CM

## 2022-03-02 DIAGNOSIS — E11.9 DIABETES MELLITUS WITHOUT COMPLICATION: ICD-10-CM

## 2022-03-02 PROCEDURE — 29581 APPL MULTLAYER CMPRN SYS LEG: CPT

## 2022-03-02 NOTE — PROGRESS NOTES
Subjective:       Patient ID: Kevan Norris is a 61 y.o. male.    Chief Complaint: Venous Stasis    3/2/22: Nurse visit for dressing change, no complaints, continued with current plan of care.     Review of Systems      Objective:         Physical Exam       Wound 02/01/22 1300 Venous Ulcer Left lateral;anterior Malleolus/Ankle (Active)   02/01/22 1300    Pre-existing: Yes   Primary Wound Type: Venous ulcer   Side: Left   Orientation: lateral;anterior   Location: Malleolus/Ankle   Wound Number:    Ankle-Brachial Index:    Pulses:    Removal Indication and Assessment:    Wound Outcome:    (Retired) Wound Type:    (Retired) Wound Length (cm):    (Retired) Wound Width (cm):    (Retired) Depth (cm):    Wound Description (Comments):    Removal Indications:    Dressing Appearance Intact;Moist drainage 03/02/22 1300   Drainage Amount Small 03/02/22 1300   Drainage Characteristics/Odor Serosanguineous 03/02/22 1300   Appearance Red;Moist;Granulating 03/02/22 1300   Tissue loss description Partial thickness 03/02/22 1300   Red (%), Wound Tissue Color 100 % 03/02/22 1300   Periwound Area Intact;Dry;Pink;Edematous 03/02/22 1300   Wound Edges Defined 03/02/22 1300   Wound Length (cm) 3.1 cm 03/02/22 1300   Wound Width (cm) 2.6 cm 03/02/22 1300   Wound Depth (cm) 0.1 cm 03/02/22 1300   Wound Volume (cm^3) 0.806 cm^3 03/02/22 1300   Wound Surface Area (cm^2) 8.06 cm^2 03/02/22 1300   Care Cleansed with:;Sterile normal saline 03/02/22 1300   Dressing Applied;Hydrofiber;Absorptive Pad;Compression wrap 03/02/22 1300   Periwound Care Topical treatment applied;Absorptive dressing applied 03/02/22 1300   Compression Two layer compression 03/02/22 1300   Dressing Change Due 03/08/22 03/02/22 1300         Assessment:         ICD-10-CM ICD-9-CM   1. Venous stasis ulcer of left ankle with fat layer exposed with varicose veins  I83.023 454.0    L97.322    2. Diabetes mellitus without complication  E11.9 250.00         Plan:   Tissue  pathology and/or culture taken:  [] Yes [x] No   Sharp debridement performed:   [] Yes [x] No   Labs ordered this visit:   [] Yes [x] No   Imaging ordered this visit:   [] Yes [x] No   Left lateral ankle venous ulcer:     Cleanse with: Normal saline   Lidocaine: prn   Silver nitrate: prn   Periwound care: Betamethasone and antifungal cream   Primary dressing: Hydrofera ready, small ABD or Mextra   Secondary dressing: Co-flex with zinc LLE toes to knee   Edema control: LLE Co-flex with zinc toes to knee   Frequency: Tuesday and Friday   Follow-up: Dr. Lott 3/8/22+ nurse visits 2 x week                    No orders of the defined types were placed in this encounter.       Follow up in about 6 days (around 3/8/2022) for .

## 2022-03-08 ENCOUNTER — HOSPITAL ENCOUNTER (OUTPATIENT)
Dept: WOUND CARE | Facility: HOSPITAL | Age: 62
Discharge: HOME | End: 2022-03-08
Attending: PODIATRIST
Payer: COMMERCIAL

## 2022-03-08 VITALS
HEART RATE: 89 BPM | HEIGHT: 78 IN | BODY MASS INDEX: 27.77 KG/M2 | DIASTOLIC BLOOD PRESSURE: 88 MMHG | WEIGHT: 240 LBS | SYSTOLIC BLOOD PRESSURE: 149 MMHG | TEMPERATURE: 98 F

## 2022-03-08 DIAGNOSIS — I83.023 VENOUS STASIS ULCER OF LEFT ANKLE WITH FAT LAYER EXPOSED WITH VARICOSE VEINS: Primary | ICD-10-CM

## 2022-03-08 DIAGNOSIS — L97.322 VENOUS STASIS ULCER OF LEFT ANKLE WITH FAT LAYER EXPOSED WITH VARICOSE VEINS: Primary | ICD-10-CM

## 2022-03-08 DIAGNOSIS — E11.9 DIABETES MELLITUS WITHOUT COMPLICATION: ICD-10-CM

## 2022-03-08 PROCEDURE — 17250 CHEM CAUT OF GRANLTJ TISSUE: CPT

## 2022-03-08 PROCEDURE — 27201912 HC WOUND CARE DEBRIDEMENT SUPPLIES

## 2022-03-08 PROCEDURE — 11042 DBRDMT SUBQ TIS 1ST 20SQCM/<: CPT

## 2022-03-08 PROCEDURE — 11042 WOUND DEBRIDEMENT: ICD-10-PCS | Mod: ,,, | Performed by: PODIATRIST

## 2022-03-08 PROCEDURE — 29581 APPL MULTLAYER CMPRN SYS LEG: CPT

## 2022-03-08 PROCEDURE — 11042 DBRDMT SUBQ TIS 1ST 20SQCM/<: CPT | Mod: ,,, | Performed by: PODIATRIST

## 2022-03-08 NOTE — PROGRESS NOTES
Subjective:       Patient ID: Kevan Norris is a 61 y.o. male.    Chief Complaint: Venous Stasis    3/8/22: Follow up for LLE venous ulcer. Wound improving, no complaints tolerating compression dressing. Continued with current plan of care.    Past Medical History:   Diagnosis Date    Blood clot in vein     Right Leg,     Diabetes mellitus     Diabetes mellitus, type 2     Gout flare     Hyperlipemia     Prostate atrophy     Varicose veins        Past Surgical History:   Procedure Laterality Date    VARICOSE VEIN SURGERY         Family History   Problem Relation Age of Onset    Diabetes Father     Diabetes Sister        Social History     Socioeconomic History    Marital status: Single   Tobacco Use    Smoking status: Former Smoker     Years: 6.00     Quit date:      Years since quittin.1    Smokeless tobacco: Never Used   Substance and Sexual Activity    Alcohol use: Yes     Comment: drinks beer, 2 cans, about 2x/week    Drug use: No    Sexual activity: Not Currently   Social History Narrative    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 Two sisters, two brothers live nearby. One sister lives in [place]. One dog at home.        Current Outpatient Medications   Medication Sig Dispense Refill    aspirin 81 mg Cap Take by mouth.      ciprofloxacin HCl (CIPRO) 500 MG tablet Take 1 tablet (500 mg total) by mouth 2 (two) times daily. (Patient not taking: Reported on 3/8/2022) 20 tablet 0    cyanocobalamin (VITAMIN B-12) 1000 MCG tablet Take 1 tablet (1,000 mcg total) by mouth once daily. 90 tablet 5    ergocalciferol (ERGOCALCIFEROL) 50,000 unit Cap TAKE 1 CAPSULE BY MOUTH EVERY 7 DAYS. THEN START DAILY OTC REPLACEMENT AFTER THIS RX IS COMPLETE 12 capsule 0    fluorouraciL (EFUDEX) 5 % cream Apply thin film to left lower forearm 2times per day for 3 weeks; d/c if area bleeding or ulcerated; avoid eyes or mouth 40 g 1    FLUZONE QUAD 1163-8855, PF, 60 mcg (15  mcg x 4)/0.5 mL Syrg       gentamicin (GARAMYCIN) 0.1 % ointment Apply topically.      losartan (COZAAR) 50 MG tablet Take 1 tablet (50 mg total) by mouth once daily. 90 tablet 1    rosuvastatin (CRESTOR) 5 MG tablet Take 1 tablet (5 mg total) by mouth once daily. 90 tablet 3     No current facility-administered medications for this encounter.       Review of patient's allergies indicates:  No Known Allergies    Review of Systems   Constitutional: Negative for chills and fever.   HENT: Negative for congestion and hearing loss.    Respiratory: Negative for cough and shortness of breath.    Cardiovascular: Positive for leg swelling. Negative for chest pain.   Gastrointestinal: Negative for nausea and vomiting.   Skin: Positive for color change and wound.   Neurological: Negative for dizziness and numbness.   Psychiatric/Behavioral: Negative for agitation.         Objective:      Temp:  [98.1 °F (36.7 °C)]   Pulse:  [89]   BP: (149)/(88)   Physical Exam  Constitutional:       General: He is not in acute distress.     Appearance: He is not ill-appearing.   Cardiovascular:      Pulses:           Dorsalis pedis pulses are detected w/ Doppler on the left side.        Posterior tibial pulses are detected w/ Doppler on the left side.      Comments: Bilateral lower extremity edema L>R with hemosiderin staining of the legs, + varicosities. No hair growth bilateral lower extremity.   Musculoskeletal:      Comments: Localized pain on palpation of wound site lateral left ankle. No palpable fluctuance or crepitance.    Skin:     General: Skin is warm.      Capillary Refill: Capillary refill takes 2 to 3 seconds.      Findings: No ecchymosis or erythema.      Nails: There is no clubbing.      Comments: Refer to wound description below     Neurological:      Mental Status: He is alert.            Wound 02/01/22 1300 Venous Ulcer Left lateral;anterior Malleolus/Ankle (Active)   02/01/22 1300    Pre-existing: Yes   Primary Wound  Type: Venous ulcer   Side: Left   Orientation: lateral;anterior   Location: Malleolus/Ankle   Wound Number:    Ankle-Brachial Index:    Pulses:    Removal Indication and Assessment:    Wound Outcome:    (Retired) Wound Type:    (Retired) Wound Length (cm):    (Retired) Wound Width (cm):    (Retired) Depth (cm):    Wound Description (Comments):    Removal Indications:    Wound Image   03/08/22 1400   Dressing Appearance Intact;Moist drainage 03/08/22 1400   Drainage Amount Small 03/08/22 1400   Drainage Characteristics/Odor Serosanguineous 03/08/22 1400   Appearance Red;Moist;Hypergranulation;Yellow 03/08/22 1400   Tissue loss description Partial thickness 03/08/22 1400   Red (%), Wound Tissue Color 90 % 03/08/22 1400   Yellow (%), Wound Tissue Color 20 % 03/08/22 1400   Periwound Area Intact;Dry;[place] 03/08/22 1400   Wound Edges Defined 03/08/22 1400   Wound Length (cm) 2 cm 03/08/22 1400   Wound Width (cm) 1 cm 03/08/22 1400   Wound Depth (cm) 0.1 cm 03/08/22 1400   Wound Volume (cm^3) 0.2 cm^3 03/08/22 1400   Wound Surface Area (cm^2) 2 cm^2 03/08/22 1400   Care Cleansed with:;Sterile normal saline;Debrided 03/08/22 1400   Dressing Applied;Hydrofiber;Foam;Compression wrap;Silver 03/08/22 1400   Periwound Care Absorptive dressing applied;Topical treatment applied 03/08/22 1400   Compression Two layer compression 03/08/22 1400   Dressing Change Due 03/16/22 03/08/22 1400         Assessment:         ICD-10-CM ICD-9-CM   1. Venous stasis ulcer of left ankle with fat layer exposed with varicose veins  I83.023 454.0    L97.322    2. Diabetes mellitus without complication  E11.9 250.00         Plan:   Tissue pathology and/or culture taken:  [] Yes [x] No   Sharp debridement performed:   [x] Yes [] No   Labs ordered this visit:   [] Yes [x] No   Imaging ordered this visit:   [] Yes [x] No           Orders Placed This Encounter   Procedures    Change dressing     Left lateral ankle venous ulcer:     Cleanse with: Normal  saline   Lidocaine: prn   Silver nitrate: prn   Periwound care: Betamethasone and antifungal cream   Primary dressing: Hydrofera ready  Secondary dressing: Co-flex with zinc LLE toes to knee   Edema control: LLE Co-flex with zinc toes to knee   Frequency: Weekly in clinic  Follow-up: Dr. Lott 3/22/22        Follow up in about 2 weeks (around 3/22/2022) for .        Wound debridement and dressing per attached note.    Local wound care per above.    Continue activity restrictions and modifications    Elevation at rest.

## 2022-03-09 NOTE — PROCEDURES
"Wound Debridement    Date/Time: 3/8/2022 1:27 PM  Performed by: Phil Lott DPM  Authorized by: Phil Lott DPM     Time out: Immediately prior to procedure a "time out" was called to verify the correct patient, procedure, equipment, support staff and site/side marked as required.    Consent Done?:  Yes (Written)    Preparation: Patient was prepped and draped in usual sterile fashion    Local anesthesia used?: Yes    Local anesthetic:  Topical anesthetic    Wound Details:    Location:  Left foot    Location:  Left Ankle (lateral)    Type of Debridement:  Excisional       Length (cm):  2       Area (sq cm):  2       Width (cm):  1       Percent Debrided (%):  100       Depth (cm):  0.1       Total Area Debrided (sq cm):  2    Depth of debridement:  Subcutaneous tissue    Tissue debrided:  Hypergranulation and Subcutaneous    Devitalized tissue debrided:  Slough and Biofilm    Instruments:  Curette    Bleeding:  Moderate  Hemostasis Achieved: Yes    Method Used:  Pressure and Silver Nitrate  Patient tolerance:  Patient tolerated the procedure well with no immediate complications      "

## 2022-03-10 ENCOUNTER — PATIENT OUTREACH (OUTPATIENT)
Dept: ADMINISTRATIVE | Facility: OTHER | Age: 62
End: 2022-03-10
Payer: COMMERCIAL

## 2022-03-10 NOTE — PROGRESS NOTES
Care Everywhere: updated  Immunization: updated  Health Maintenance: updated  Media Review: review for outside colon cancer report   Legacy Review:   DIS:  Order placed:   Upcoming appts:  EFAX:  Task Tickets:  Referrals:  Colonoscopy case request 12.15.2021

## 2022-03-14 ENCOUNTER — OFFICE VISIT (OUTPATIENT)
Dept: DERMATOLOGY | Facility: CLINIC | Age: 62
End: 2022-03-14
Payer: COMMERCIAL

## 2022-03-14 DIAGNOSIS — L82.1 SEBORRHEIC KERATOSES: ICD-10-CM

## 2022-03-14 DIAGNOSIS — L57.0 AK (ACTINIC KERATOSIS): Primary | ICD-10-CM

## 2022-03-14 DIAGNOSIS — B35.3 TINEA PEDIS OF RIGHT FOOT: ICD-10-CM

## 2022-03-14 PROCEDURE — 99214 OFFICE O/P EST MOD 30 MIN: CPT | Mod: 25,S$GLB,, | Performed by: DERMATOLOGY

## 2022-03-14 PROCEDURE — 99999 PR PBB SHADOW E&M-EST. PATIENT-LVL III: ICD-10-PCS | Mod: PBBFAC,,, | Performed by: DERMATOLOGY

## 2022-03-14 PROCEDURE — 1160F RVW MEDS BY RX/DR IN RCRD: CPT | Mod: CPTII,S$GLB,, | Performed by: DERMATOLOGY

## 2022-03-14 PROCEDURE — 1159F MED LIST DOCD IN RCRD: CPT | Mod: CPTII,S$GLB,, | Performed by: DERMATOLOGY

## 2022-03-14 PROCEDURE — 1159F PR MEDICATION LIST DOCUMENTED IN MEDICAL RECORD: ICD-10-PCS | Mod: CPTII,S$GLB,, | Performed by: DERMATOLOGY

## 2022-03-14 PROCEDURE — 1160F PR REVIEW ALL MEDS BY PRESCRIBER/CLIN PHARMACIST DOCUMENTED: ICD-10-PCS | Mod: CPTII,S$GLB,, | Performed by: DERMATOLOGY

## 2022-03-14 PROCEDURE — 99214 PR OFFICE/OUTPT VISIT, EST, LEVL IV, 30-39 MIN: ICD-10-PCS | Mod: 25,S$GLB,, | Performed by: DERMATOLOGY

## 2022-03-14 PROCEDURE — 17003 DESTRUCTION, PREMALIGNANT LESIONS; SECOND THROUGH 14 LESIONS: ICD-10-PCS | Mod: S$GLB,,, | Performed by: DERMATOLOGY

## 2022-03-14 PROCEDURE — 4010F ACE/ARB THERAPY RXD/TAKEN: CPT | Mod: CPTII,S$GLB,, | Performed by: DERMATOLOGY

## 2022-03-14 PROCEDURE — 17000 PR DESTRUCTION(LASER SURGERY,CRYOSURGERY,CHEMOSURGERY),PREMALIGNANT LESIONS,FIRST LESION: ICD-10-PCS | Mod: S$GLB,,, | Performed by: DERMATOLOGY

## 2022-03-14 PROCEDURE — 99999 PR PBB SHADOW E&M-EST. PATIENT-LVL III: CPT | Mod: PBBFAC,,, | Performed by: DERMATOLOGY

## 2022-03-14 PROCEDURE — 17000 DESTRUCT PREMALG LESION: CPT | Mod: S$GLB,,, | Performed by: DERMATOLOGY

## 2022-03-14 PROCEDURE — 4010F PR ACE/ARB THEARPY RXD/TAKEN: ICD-10-PCS | Mod: CPTII,S$GLB,, | Performed by: DERMATOLOGY

## 2022-03-14 PROCEDURE — 17003 DESTRUCT PREMALG LES 2-14: CPT | Mod: S$GLB,,, | Performed by: DERMATOLOGY

## 2022-03-14 RX ORDER — KETOCONAZOLE 20 MG/G
CREAM TOPICAL
Qty: 60 G | Refills: 3 | Status: SHIPPED | OUTPATIENT
Start: 2022-03-14 | End: 2023-06-22 | Stop reason: SDUPTHER

## 2022-03-14 NOTE — PATIENT INSTRUCTIONS
Efudex/Florouracil treatment: left mid forearm 2x per day for 3 weeks   Discussed to treat as directed and that area will be red, inflamed, and irritated which is a normal reaction.  Medication should be discontinued if area treated is ulcerated or bleeding.  Pt should avoid medication contacting eyes or mouth. Pt can send photo or call if concerned about reaction.      Use ketoconazole to right foot and right shin and ankle bid x 3 weeks

## 2022-03-14 NOTE — PROGRESS NOTES
Subjective:       Patient ID:  Kevan Norris is a 61 y.o. male who presents for   Chief Complaint   Patient presents with    Follow-up     L lower forearm    Itching     feet     Pt was supposed to use Efudex to l lower forearm, pt states he did not open it as he was supposed to come  some documents from the office, but no one was here.    Also c/o itching to feet    Follow-up    Itching - Initial  Affected locations: left foot and right foot  Signs / symptoms: itching  Severity: severe  Timing: constant and worse at night  Aggravated by: scratching  Relieving factors/Treatments tried: Rx topical steroids and anti-itch cream  Improvement on treatment: mild        Review of Systems   Skin: Positive for itching, rash and dry skin.        Objective:    Physical Exam   Constitutional: He appears well-developed and well-nourished. No distress.   Neurological: He is alert and oriented to person, place, and time. He is not disoriented.   Psychiatric: He has a normal mood and affect.   Skin:   Areas Examined (abnormalities noted in diagram):   Scalp / Hair Palpated and Inspected  Head / Face Inspection Performed  RUE Inspected  LUE Inspection Performed  RLE Inspected  LLE Inspection Performed                       Diagram Legend     Erythematous scaling macule/papule c/w actinic keratosis       Vascular papule c/w angioma      Pigmented verrucoid papule/plaque c/w seborrheic keratosis      Yellow umbilicated papule c/w sebaceous hyperplasia      Irregularly shaped tan macule c/w lentigo     1-2 mm smooth white papules consistent with Milia      Movable subcutaneous cyst with punctum c/w epidermal inclusion cyst      Subcutaneous movable cyst c/w pilar cyst      Firm pink to brown papule c/w dermatofibroma      Pedunculated fleshy papule(s) c/w skin tag(s)      Evenly pigmented macule c/w junctional nevus     Mildly variegated pigmented, slightly irregular-bordered macule c/w mildly atypical nevus      Flesh  colored to evenly pigmented papule c/w intradermal nevus       Pink pearly papule/plaque c/w basal cell carcinoma      Erythematous hyperkeratotic cursted plaque c/w SCC      Surgical scar with no sign of skin cancer recurrence      Open and closed comedones      Inflammatory papules and pustules      Verrucoid papule consistent consistent with wart     Erythematous eczematous patches and plaques     Dystrophic onycholytic nail with subungual debris c/w onychomycosis     Umbilicated papule    Erythematous-base heme-crusted tan verrucoid plaque consistent with inflamed seborrheic keratosis     Erythematous Silvery Scaling Plaque c/w Psoriasis     See annotation      Assessment / Plan:        AK (actinic keratosis)  Cryosurgery Procedure Note    Verbal consent from the patient is obtained including, but not limited to, risk of hypopigmentation/hyperpigmentation, scar, recurrence of lesion. The patient is aware of the precancerous quality and need for treatment of these lesions. Liquid nitrogen cryosurgery is applied to the 6 actinic keratoses, as detailed in the physical exam, to produce a freeze injury. The patient is aware that blisters may form and is instructed on wound care with gentle cleansing and use of vaseline ointment to keep moist until healed. The patient is supplied a handout on cryosurgery and is instructed to call if lesions do not completely resolve.    Efudex/Florouracil treatment: left mid forearm 2x per day for 3 weeks   Discussed to treat as directed and that area will be red, inflamed, and irritated which is a normal reaction.  Medication should be discontinued if area treated is ulcerated or bleeding.  Pt should avoid medication contacting eyes or mouth. Pt can send photo or call if concerned about reaction.    Encouraged to wear a hat    Seborrheic keratoses  -     Ambulatory referral/consult to Dermatology  These are benign inherited growths without a malignant potential. Reassurance given to  patient. No treatment is necessary.     Tinea pedis of right foot  -     ketoconazole (NIZORAL) 2 % cream; AAA bid x 3 weeks  Dispense: 60 g; Refill: 3  Use ketoconazole to right foot and right shin and ankle bid x 3 weeks             Follow up in about 6 months (around 9/14/2022).

## 2022-03-16 ENCOUNTER — HOSPITAL ENCOUNTER (OUTPATIENT)
Dept: WOUND CARE | Facility: HOSPITAL | Age: 62
Discharge: HOME | End: 2022-03-16
Attending: PODIATRIST
Payer: COMMERCIAL

## 2022-03-16 DIAGNOSIS — E11.9 DIABETES MELLITUS WITHOUT COMPLICATION: Primary | ICD-10-CM

## 2022-03-16 DIAGNOSIS — I83.023 VENOUS STASIS ULCER OF LEFT ANKLE WITH FAT LAYER EXPOSED WITH VARICOSE VEINS: ICD-10-CM

## 2022-03-16 DIAGNOSIS — L97.322 VENOUS STASIS ULCER OF LEFT ANKLE WITH FAT LAYER EXPOSED WITH VARICOSE VEINS: ICD-10-CM

## 2022-03-16 PROCEDURE — 29581 APPL MULTLAYER CMPRN SYS LEG: CPT

## 2022-03-16 NOTE — PROGRESS NOTES
Subjective:       Patient ID: Kevan Norris is a 61 y.o. male.    Chief Complaint: Venous Ulcer    3/16/22: Nurse visit for dressing change. No complaints, continued with current plan of care.     Review of Systems      Objective:         Physical Exam       Wound 02/01/22 1300 Venous Ulcer Left lateral;anterior Malleolus/Ankle (Active)   02/01/22 1300    Pre-existing: Yes   Primary Wound Type: Venous ulcer   Side: Left   Orientation: lateral;anterior   Location: Malleolus/Ankle   Wound Number:    Ankle-Brachial Index:    Pulses:    Removal Indication and Assessment:    Wound Outcome:    (Retired) Wound Type:    (Retired) Wound Length (cm):    (Retired) Wound Width (cm):    (Retired) Depth (cm):    Wound Description (Comments):    Removal Indications:    Dressing Appearance Intact;Moist drainage 03/16/22 1300   Drainage Amount Small 03/16/22 1300   Drainage Characteristics/Odor Serosanguineous 03/16/22 1300   Appearance Red;Yellow;Moist;Hypergranulation 03/16/22 1300   Tissue loss description Partial thickness 03/16/22 1300   Red (%), Wound Tissue Color 90 % 03/16/22 1300   Yellow (%), Wound Tissue Color 10 % 03/16/22 1300   Periwound Area Intact;Dry;[place] 03/16/22 1300   Wound Edges Defined 03/16/22 1300   Wound Length (cm) 2 cm 03/16/22 1300   Wound Width (cm) 1 cm 03/16/22 1300   Wound Depth (cm) 0.1 cm 03/16/22 1300   Wound Volume (cm^3) 0.2 cm^3 03/16/22 1300   Wound Surface Area (cm^2) 2 cm^2 03/16/22 1300   Care Cleansed with:;Sterile normal saline 03/16/22 1300   Dressing Applied;Hydrofiber;Foam;Compression wrap 03/16/22 1300   Periwound Care Absorptive dressing applied;Topical treatment applied 03/16/22 1300   Compression Two layer compression 03/16/22 1300   Dressing Change Due 03/22/22 03/16/22 1300         Assessment:         ICD-10-CM ICD-9-CM   1. Diabetes mellitus without complication  E11.9 250.00   2. Venous stasis ulcer of left ankle with fat layer exposed with varicose veins  I83.023 454.0     L97.322          Plan:   Tissue pathology and/or culture taken:  [] Yes [x] No   Sharp debridement performed:   [] Yes [x] No   Labs ordered this visit:   [] Yes [x] No   Imaging ordered this visit:   [] Yes [x] No     Left lateral ankle venous ulcer:     Cleanse with: Normal saline   Lidocaine: prn   Silver nitrate: prn   Periwound care: Betamethasone and antifungal cream   Primary dressing: Hydrofera ready   Secondary dressing: Co-flex with zinc LLE toes to knee   Edema control: LLE Co-flex with zinc toes to knee   Frequency: Weekly in clinic   Follow-up: Dr. Lott 3/22/22      No orders of the defined types were placed in this encounter.       Follow up in about 6 days (around 3/22/2022) for .

## 2022-03-22 ENCOUNTER — HOSPITAL ENCOUNTER (OUTPATIENT)
Dept: WOUND CARE | Facility: HOSPITAL | Age: 62
Discharge: HOME | End: 2022-03-22
Attending: PODIATRIST
Payer: COMMERCIAL

## 2022-03-22 VITALS
DIASTOLIC BLOOD PRESSURE: 108 MMHG | SYSTOLIC BLOOD PRESSURE: 170 MMHG | TEMPERATURE: 98 F | BODY MASS INDEX: 27.77 KG/M2 | HEIGHT: 78 IN | WEIGHT: 240 LBS | HEART RATE: 73 BPM

## 2022-03-22 DIAGNOSIS — I83.023 VENOUS STASIS ULCER OF LEFT ANKLE WITH FAT LAYER EXPOSED WITH VARICOSE VEINS: Primary | ICD-10-CM

## 2022-03-22 DIAGNOSIS — L97.322 VENOUS STASIS ULCER OF LEFT ANKLE WITH FAT LAYER EXPOSED WITH VARICOSE VEINS: Primary | ICD-10-CM

## 2022-03-22 PROCEDURE — 11042 WOUND DEBRIDEMENT: ICD-10-PCS | Mod: ,,, | Performed by: PODIATRIST

## 2022-03-22 PROCEDURE — 11042 DBRDMT SUBQ TIS 1ST 20SQCM/<: CPT | Mod: ,,, | Performed by: PODIATRIST

## 2022-03-22 PROCEDURE — 27201912 HC WOUND CARE DEBRIDEMENT SUPPLIES

## 2022-03-22 PROCEDURE — 11042 DBRDMT SUBQ TIS 1ST 20SQCM/<: CPT

## 2022-03-22 NOTE — PROGRESS NOTES
Subjective:       Patient ID: Kevan Norris is a 61 y.o. male.    Chief Complaint: Venous Ulcer    3/22/22: Follow up for LLE venous stasis ulcer. Wound improving. Sharps debridement tolerated well. Continued with current plan of care.     Past Medical History:   Diagnosis Date    Blood clot in vein     Right Leg,     Diabetes mellitus     Diabetes mellitus, type 2     Gout flare     Hyperlipemia     Prostate atrophy     Varicose veins        Past Surgical History:   Procedure Laterality Date    VARICOSE VEIN SURGERY         Family History   Problem Relation Age of Onset    Diabetes Father     Diabetes Sister        Social History     Socioeconomic History    Marital status: Single   Tobacco Use    Smoking status: Former Smoker     Years: 6.00     Quit date:      Years since quittin.2    Smokeless tobacco: Never Used   Substance and Sexual Activity    Alcohol use: Yes     Comment: drinks beer, 2 cans, about 2x/week    Drug use: No    Sexual activity: Not Currently   Social History Narrative    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 Two sisters, two brothers live nearby. One sister lives in [place]. One dog at home.        Current Outpatient Medications   Medication Sig Dispense Refill    aspirin 81 mg Cap Take by mouth.      ciprofloxacin HCl (CIPRO) 500 MG tablet Take 1 tablet (500 mg total) by mouth 2 (two) times daily. (Patient not taking: No sig reported) 20 tablet 0    cyanocobalamin (VITAMIN B-12) 1000 MCG tablet Take 1 tablet (1,000 mcg total) by mouth once daily. 90 tablet 5    ergocalciferol (ERGOCALCIFEROL) 50,000 unit Cap TAKE 1 CAPSULE BY MOUTH EVERY 7 DAYS. THEN START DAILY OTC REPLACEMENT AFTER THIS RX IS COMPLETE 12 capsule 0    fluorouraciL (EFUDEX) 5 % cream Apply thin film to left lower forearm 2times per day for 3 weeks; d/c if area bleeding or ulcerated; avoid eyes or mouth 40 g 1    FLUZONE QUAD 7445-1777, PF, 60 mcg (15 mcg x  4)/0.5 mL Syrg       gentamicin (GARAMYCIN) 0.1 % ointment Apply topically.      ketoconazole (NIZORAL) 2 % cream AAA bid x 3 weeks 60 g 3    losartan (COZAAR) 50 MG tablet Take 1 tablet (50 mg total) by mouth once daily. 90 tablet 1    rosuvastatin (CRESTOR) 5 MG tablet Take 1 tablet (5 mg total) by mouth once daily. 90 tablet 3     No current facility-administered medications for this encounter.       Review of patient's allergies indicates:  No Known Allergies    Review of Systems   Constitutional: Negative for chills and fever.   HENT: Negative for congestion and hearing loss.    Respiratory: Negative for cough and shortness of breath.    Cardiovascular: Positive for leg swelling. Negative for chest pain.   Gastrointestinal: Negative for nausea and vomiting.   Skin: Positive for color change and wound.   Neurological: Negative for dizziness and numbness.   Psychiatric/Behavioral: Negative for agitation.         Objective:      Temp:  [98.1 °F (36.7 °C)]   Pulse:  [73]   BP: (170)/(108)   Physical Exam  Constitutional:       General: He is not in acute distress.     Appearance: He is not ill-appearing.   Cardiovascular:      Pulses:           Dorsalis pedis pulses are detected w/ Doppler on the left side.        Posterior tibial pulses are detected w/ Doppler on the left side.      Comments: Bilateral lower extremity edema L>R with hemosiderin staining of the legs, + varicosities. No hair growth bilateral lower extremity.   Musculoskeletal:      Comments: Localized pain on palpation of wound site lateral left ankle. No palpable fluctuance or crepitance.    Skin:     General: Skin is warm.      Capillary Refill: Capillary refill takes 2 to 3 seconds.      Findings: No ecchymosis or erythema.      Nails: There is no clubbing.      Comments: Refer to wound description below     Neurological:      Mental Status: He is alert.            Wound 02/01/22 1300 Venous Ulcer Left lateral;anterior Malleolus/Ankle  (Active)   02/01/22 1300    Pre-existing: Yes   Primary Wound Type: Venous ulcer   Side: Left   Orientation: lateral;anterior   Location: Malleolus/Ankle   Wound Number:    Ankle-Brachial Index:    Pulses:    Removal Indication and Assessment:    Wound Outcome:    (Retired) Wound Type:    (Retired) Wound Length (cm):    (Retired) Wound Width (cm):    (Retired) Depth (cm):    Wound Description (Comments):    Removal Indications:    Wound Image   03/22/22 1500   Dressing Appearance Intact;Moist drainage 03/22/22 1500   Drainage Amount Small 03/22/22 1500   Drainage Characteristics/Odor Serosanguineous 03/22/22 1500   Appearance Red;Yellow;Moist 03/22/22 1500   Tissue loss description Partial thickness 03/22/22 1500   Red (%), Wound Tissue Color 90 % 03/22/22 1500   Yellow (%), Wound Tissue Color 10 % 03/22/22 1500   Periwound Area Intact;Dry;Edematous 03/22/22 1500   Wound Edges Defined 03/22/22 1500   Wound Length (cm) 1 cm 03/22/22 1500   Wound Width (cm) 0.5 cm 03/22/22 1500   Wound Depth (cm) 0.1 cm 03/22/22 1500   Wound Volume (cm^3) 0.05 cm^3 03/22/22 1500   Wound Surface Area (cm^2) 0.5 cm^2 03/22/22 1500   Care Cleansed with:;Sterile normal saline;Debrided 03/22/22 1500   Dressing Applied;Hydrofiber;Compression wrap 03/22/22 1500   Periwound Care Absorptive dressing applied;Topical treatment applied 03/22/22 1500   Compression Two layer compression 03/22/22 1500   Dressing Change Due 03/29/22 03/22/22 1500         Assessment:         ICD-10-CM ICD-9-CM   1. Venous stasis ulcer of left ankle with fat layer exposed with varicose veins  I83.023 454.0    L97.322          Plan:   Tissue pathology and/or culture taken:  [] Yes [x] No   Sharp debridement performed:   [x] Yes [] No   Labs ordered this visit:   [] Yes [x] No   Imaging ordered this visit:   [] Yes [x] No           Orders Placed This Encounter   Procedures    Debridement     This order was created via procedure documentation     Standing Status:    Standing     Number of Occurrences:   1    Change dressing     Left lateral ankle venous ulcer:     Cleanse with: Normal saline   Lidocaine: prn   Silver nitrate: prn   Periwound care: Betamethasone and antifungal cream   Primary dressing: Hydrofera ready   Secondary dressing: Co-flex with zinc LLE toes to knee   Edema control: LLE Co-flex with zinc toes to knee   Frequency: Weekly in clinic   Follow-up: Dr. Lott 4/12/22        Follow up in about 3 weeks (around 4/12/2022) for .        Wound debridement and dressing per attached note.    Local wound care per above.    Elevation at rest.    Assisted by Jaylen Lugo DPM PGY 2    Anticipate wound closure by next visit.

## 2022-03-22 NOTE — PROCEDURES
"Wound Debridement    Date/Time: 3/22/2022 2:54 PM  Performed by: Phil Lott DPM  Authorized by: Phil Lott DPM     Time out: Immediately prior to procedure a "time out" was called to verify the correct patient, procedure, equipment, support staff and site/side marked as required.    Consent Done?:  Yes (Written)    Preparation: Patient was prepped and draped in usual sterile fashion    Local anesthesia used?: Yes    Local anesthetic:  Topical anesthetic    Wound Details:    Location:  Left foot    Location:  Left Ankle (lateral)    Type of Debridement:  Excisional       Length (cm):  1       Area (sq cm):  0.6       Width (cm):  0.6       Percent Debrided (%):  100       Depth (cm):  0.1       Total Area Debrided (sq cm):  0.6    Depth of debridement:  Subcutaneous tissue    Tissue debrided:  Hypergranulation and Subcutaneous    Devitalized tissue debrided:  Slough, Fibrin and Biofilm    Instruments:  Curette    Bleeding:  Minimal  Hemostasis Achieved: Yes    Method Used:  Pressure  Patient tolerance:  Patient tolerated the procedure well with no immediate complications     Assisted by Jaylen Lugo DPM PGY 2      "

## 2022-03-30 ENCOUNTER — HOSPITAL ENCOUNTER (OUTPATIENT)
Dept: WOUND CARE | Facility: HOSPITAL | Age: 62
Discharge: HOME | End: 2022-03-30
Attending: PODIATRIST
Payer: COMMERCIAL

## 2022-03-30 DIAGNOSIS — L97.322 VENOUS STASIS ULCER OF LEFT ANKLE WITH FAT LAYER EXPOSED WITH VARICOSE VEINS: Primary | ICD-10-CM

## 2022-03-30 DIAGNOSIS — I83.023 VENOUS STASIS ULCER OF LEFT ANKLE WITH FAT LAYER EXPOSED WITH VARICOSE VEINS: Primary | ICD-10-CM

## 2022-03-30 PROCEDURE — 29581 APPL MULTLAYER CMPRN SYS LEG: CPT

## 2022-03-30 NOTE — PROGRESS NOTES
Subjective:       Patient ID: Kevan Norris is a 61 y.o. male.    Chief Complaint: Wound Care    Nurse visit for dressing change. No new complaints from patient today. Continue with curent treatment.    Review of Systems      Objective:         Physical Exam       Wound 02/01/22 1300 Venous Ulcer Left lateral;anterior Malleolus/Ankle (Active)   02/01/22 1300    Pre-existing: Yes   Primary Wound Type: Venous ulcer   Side: Left   Orientation: lateral;anterior   Location: Malleolus/Ankle   Wound Number:    Ankle-Brachial Index:    Pulses:    Removal Indication and Assessment:    Wound Outcome:    (Retired) Wound Type:    (Retired) Wound Length (cm):    (Retired) Wound Width (cm):    (Retired) Depth (cm):    Wound Description (Comments):    Removal Indications:    Dressing Appearance Intact 03/30/22 1500   Drainage Amount Small 03/30/22 1500   Drainage Characteristics/Odor Serosanguineous 03/30/22 1500   Appearance Pink;Red;Moist 03/30/22 1500   Tissue loss description Partial thickness 03/30/22 1500   Red (%), Wound Tissue Color 100 % 03/30/22 1500   Periwound Area Intact 03/30/22 1500   Wound Edges Irregular 03/30/22 1500   Wound Length (cm) 1 cm 03/30/22 1500   Wound Width (cm) 0.5 cm 03/30/22 1500   Wound Depth (cm) 0.1 cm 03/30/22 1500   Wound Volume (cm^3) 0.05 cm^3 03/30/22 1500   Wound Surface Area (cm^2) 0.5 cm^2 03/30/22 1500   Care Cleansed with:;Sterile normal saline 03/30/22 1500   Dressing Applied;Compression wrap;Other (comment) 03/30/22 1500   Periwound Care Topical treatment applied 03/30/22 1500   Compression Two layer compression 03/30/22 1500   Dressing Change Due 04/05/22 03/30/22 1500         Assessment:         ICD-10-CM ICD-9-CM   1. Venous stasis ulcer of left ankle with fat layer exposed with varicose veins  I83.023 454.0    L97.322          Plan:   Tissue pathology and/or culture taken:  [] Yes [x] No   Sharp debridement performed:   [] Yes [x] No   Labs ordered this visit:   [] Yes  [x] No   Imaging ordered this visit:   [] Yes [x] No         Left lateral ankle venous ulcer:     Cleanse with: Normal saline   Lidocaine: prn   Silver nitrate: prn   Periwound care: Betamethasone and antifungal cream   Primary dressing: Hydrofera ready   Secondary dressing: Co-flex with zinc LLE toes to knee   Edema control: LLE Co-flex with zinc toes to knee   Frequency: Weekly in clinic        Follow up in about 13 days (around 4/12/2022) for Dr. Lott.

## 2022-04-05 ENCOUNTER — HOSPITAL ENCOUNTER (OUTPATIENT)
Dept: WOUND CARE | Facility: HOSPITAL | Age: 62
Discharge: HOME | End: 2022-04-05
Attending: PODIATRIST
Payer: COMMERCIAL

## 2022-04-05 DIAGNOSIS — L97.929 VENOUS STASIS ULCER OF LEFT LOWER LEG WITH EDEMA OF LEFT LOWER LEG: ICD-10-CM

## 2022-04-05 DIAGNOSIS — L03.116 CELLULITIS OF LEFT LOWER EXTREMITY: ICD-10-CM

## 2022-04-05 DIAGNOSIS — E11.9 DIABETES MELLITUS WITHOUT COMPLICATION: ICD-10-CM

## 2022-04-05 DIAGNOSIS — R60.0 VENOUS STASIS ULCER OF LEFT LOWER LEG WITH EDEMA OF LEFT LOWER LEG: ICD-10-CM

## 2022-04-05 DIAGNOSIS — I83.892 VENOUS STASIS ULCER OF LEFT LOWER LEG WITH EDEMA OF LEFT LOWER LEG: ICD-10-CM

## 2022-04-05 DIAGNOSIS — I83.023 VENOUS STASIS ULCER OF LEFT ANKLE WITH FAT LAYER EXPOSED WITH VARICOSE VEINS: Primary | ICD-10-CM

## 2022-04-05 DIAGNOSIS — L97.322 VENOUS STASIS ULCER OF LEFT ANKLE WITH FAT LAYER EXPOSED WITH VARICOSE VEINS: Primary | ICD-10-CM

## 2022-04-05 DIAGNOSIS — I83.029 VENOUS STASIS ULCER OF LEFT LOWER LEG WITH EDEMA OF LEFT LOWER LEG: ICD-10-CM

## 2022-04-05 PROCEDURE — 29581 APPL MULTLAYER CMPRN SYS LEG: CPT

## 2022-04-05 NOTE — PROGRESS NOTES
Subjective:       Patient ID: Kevan Norris is a 61 y.o. male.    Chief Complaint: Non-healing Wound Follow Up    4/5/22:  Nurse visit for wound assessment and dressing change.  No new complaints.  Care tolerated well.      Review of Systems      Objective:         Physical Exam       Wound 02/01/22 1300 Venous Ulcer Left lateral;anterior Malleolus/Ankle (Active)   02/01/22 1300    Pre-existing: Yes   Primary Wound Type: Venous ulcer   Side: Left   Orientation: lateral;anterior   Location: Malleolus/Ankle   Wound Number:    Ankle-Brachial Index:    Pulses:    Removal Indication and Assessment:    Wound Outcome:    (Retired) Wound Type:    (Retired) Wound Length (cm):    (Retired) Wound Width (cm):    (Retired) Depth (cm):    Wound Description (Comments):    Removal Indications:    Dressing Appearance Intact;Moist drainage 04/05/22 1400   Drainage Amount Small 04/05/22 1400   Drainage Characteristics/Odor Serosanguineous 04/05/22 1400   Appearance Red;Pink;Moist;Fibrin 04/05/22 1400   Tissue loss description Partial thickness 04/05/22 1400   Periwound Area Edematous;Pink 04/05/22 1400   Wound Edges Irregular 04/05/22 1400   Lucas Classification (diabetic foot ulcers only) Grade 2 04/05/22 1400   Care Cleansed with:;Sterile normal saline 04/05/22 1400   Dressing Applied;Changed;Compression wrap 04/05/22 1400   Periwound Care Absorptive dressing applied 04/05/22 1400   Compression Two layer compression 04/05/22 1400   Dressing Change Due 04/12/22 04/05/22 1400         Assessment:         ICD-10-CM ICD-9-CM   1. Venous stasis ulcer of left ankle with fat layer exposed with varicose veins  I83.023 454.0    L97.322    2. Diabetes mellitus without complication  E11.9 250.00   3. Venous stasis ulcer of left lower leg with edema of left lower leg  I83.029 454.8    I83.892 454.0    L97.929     R60.9    4. Cellulitis of left lower extremity  L03.116 682.6       Left lateral ankle venous ulcer:     Cleanse with: Normal  saline   Lidocaine: prn   Silver nitrate: prn   Periwound care: Betamethasone and antifungal cream   Primary dressing: Hydrofera ready   Secondary dressing: Co-flex with zinc LLE toes to knee   Edema control: LLE Co-flex with zinc toes to knee   Frequency: Weekly in clinic   Follow-up: Dr. Lott 4/12/22  Plan:   Tissue pathology and/or culture taken:  [] Yes [x] No   Sharp debridement performed:   [] Yes [x] No   Labs ordered this visit:   [] Yes [x] No   Imaging ordered this visit:   [] Yes [x] No           No orders of the defined types were placed in this encounter.       Follow up in about 1 week (around 4/12/2022).

## 2022-04-12 ENCOUNTER — HOSPITAL ENCOUNTER (OUTPATIENT)
Dept: WOUND CARE | Facility: HOSPITAL | Age: 62
Discharge: HOME | End: 2022-04-12
Attending: PODIATRIST
Payer: COMMERCIAL

## 2022-04-12 VITALS
BODY MASS INDEX: 27.77 KG/M2 | WEIGHT: 240 LBS | TEMPERATURE: 98 F | HEIGHT: 78 IN | HEART RATE: 72 BPM | SYSTOLIC BLOOD PRESSURE: 155 MMHG | DIASTOLIC BLOOD PRESSURE: 93 MMHG

## 2022-04-12 DIAGNOSIS — I83.023 VENOUS STASIS ULCER OF LEFT ANKLE WITH FAT LAYER EXPOSED WITH VARICOSE VEINS: Primary | ICD-10-CM

## 2022-04-12 DIAGNOSIS — L97.322 VENOUS STASIS ULCER OF LEFT ANKLE WITH FAT LAYER EXPOSED WITH VARICOSE VEINS: Primary | ICD-10-CM

## 2022-04-12 PROCEDURE — 11042 WOUND DEBRIDEMENT: ICD-10-PCS | Mod: ,,, | Performed by: PODIATRIST

## 2022-04-12 PROCEDURE — 27201912 HC WOUND CARE DEBRIDEMENT SUPPLIES

## 2022-04-12 PROCEDURE — 11042 DBRDMT SUBQ TIS 1ST 20SQCM/<: CPT | Mod: ,,, | Performed by: PODIATRIST

## 2022-04-12 PROCEDURE — 11042 DBRDMT SUBQ TIS 1ST 20SQCM/<: CPT

## 2022-04-12 PROCEDURE — 29581 APPL MULTLAYER CMPRN SYS LEG: CPT

## 2022-04-12 NOTE — PROGRESS NOTES
Subjective:       Patient ID: Kevan Norris is a 61 y.o. male.    Chief Complaint: Venous Ulcer    22: Follow up for LLE venous ulcer. Wound improving, sharps debridement tolerated well. Continued with current plan of care.     Past Medical History:   Diagnosis Date    Blood clot in vein     Right Leg,     Diabetes mellitus     Diabetes mellitus, type 2     Gout flare     Hyperlipemia     Prostate atrophy     Varicose veins        Past Surgical History:   Procedure Laterality Date    VARICOSE VEIN SURGERY         Family History   Problem Relation Age of Onset    Diabetes Father     Diabetes Sister        Social History     Socioeconomic History    Marital status: Single   Tobacco Use    Smoking status: Former Smoker     Years: 6.00     Quit date:      Years since quittin.2    Smokeless tobacco: Never Used   Substance and Sexual Activity    Alcohol use: Yes     Comment: drinks beer, 2 cans, about 2x/week    Drug use: No    Sexual activity: Not Currently   Social History Narrative    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 Two sisters, two brothers live nearby. One sister lives in [place]. One dog at home.        Current Outpatient Medications   Medication Sig Dispense Refill    aspirin 81 mg Cap Take by mouth.      cyanocobalamin (VITAMIN B-12) 1000 MCG tablet Take 1 tablet (1,000 mcg total) by mouth once daily. 90 tablet 5    ergocalciferol (ERGOCALCIFEROL) 50,000 unit Cap TAKE 1 CAPSULE BY MOUTH EVERY 7 DAYS. THEN START DAILY OTC REPLACEMENT AFTER THIS RX IS COMPLETE 12 capsule 0    fluorouraciL (EFUDEX) 5 % cream Apply thin film to left lower forearm 2times per day for 3 weeks; d/c if area bleeding or ulcerated; avoid eyes or mouth 40 g 1    FLUZONE QUAD 1499-1428, PF, 60 mcg (15 mcg x 4)/0.5 mL Syrg       gentamicin (GARAMYCIN) 0.1 % ointment Apply topically.      ketoconazole (NIZORAL) 2 % cream AAA bid x 3 weeks 60 g 3    losartan (COZAAR)  50 MG tablet Take 1 tablet (50 mg total) by mouth once daily. 90 tablet 1    rosuvastatin (CRESTOR) 5 MG tablet TAKE 1 TABLET BY MOUTH EVERY DAY 90 tablet 3     No current facility-administered medications for this encounter.       Review of patient's allergies indicates:  No Known Allergies    Review of Systems   Constitutional: Negative for chills and fever.   HENT: Negative for congestion and hearing loss.    Respiratory: Negative for cough and shortness of breath.    Cardiovascular: Positive for leg swelling. Negative for chest pain.   Gastrointestinal: Negative for nausea and vomiting.   Skin: Positive for color change and wound.   Neurological: Negative for dizziness and numbness.   Psychiatric/Behavioral: Negative for agitation.         Objective:      Temp:  [98.4 °F (36.9 °C)]   Pulse:  [72]   BP: (155)/(93)   Physical Exam  Constitutional:       General: He is not in acute distress.     Appearance: He is not ill-appearing.   Cardiovascular:      Pulses:           Dorsalis pedis pulses are detected w/ Doppler on the left side.        Posterior tibial pulses are detected w/ Doppler on the left side.      Comments: Bilateral lower extremity edema L>R with hemosiderin staining of the legs, + varicosities. No hair growth bilateral lower extremity.   Musculoskeletal:      Comments: Localized pain on palpation of wound site lateral left ankle. No palpable fluctuance or crepitance.    Skin:     General: Skin is warm.      Capillary Refill: Capillary refill takes 2 to 3 seconds.      Findings: No ecchymosis or erythema.      Nails: There is no clubbing.      Comments: Refer to wound description below     Neurological:      Mental Status: He is alert.            Wound 02/01/22 1300 Venous Ulcer Left lateral;anterior Malleolus/Ankle (Active)   02/01/22 1300    Pre-existing: Yes   Primary Wound Type: Venous ulcer   Side: Left   Orientation: lateral;anterior   Location: Malleolus/Ankle   Wound Number:     Ankle-Brachial Index:    Pulses:    Removal Indication and Assessment:    Wound Outcome:    (Retired) Wound Type:    (Retired) Wound Length (cm):    (Retired) Wound Width (cm):    (Retired) Depth (cm):    Wound Description (Comments):    Removal Indications:    Wound Image    04/12/22 1400   Dressing Appearance Intact;Moist drainage 04/12/22 1400   Drainage Amount Scant 04/12/22 1400   Drainage Characteristics/Odor Serosanguineous 04/12/22 1400   Appearance Pink;Moist;Red 04/12/22 1400   Tissue loss description Partial thickness 04/12/22 1400   Red (%), Wound Tissue Color 100 % 04/12/22 1400   Periwound Area Edematous;Pink;Dry 04/12/22 1400   Wound Edges Defined 04/12/22 1400   Wound Length (cm) 0.5 cm 04/12/22 1400   Wound Width (cm) 0.1 cm 04/12/22 1400   Wound Depth (cm) 0.1 cm 04/12/22 1400   Wound Volume (cm^3) 0.005 cm^3 04/12/22 1400   Wound Surface Area (cm^2) 0.05 cm^2 04/12/22 1400   Care Cleansed with:;Soap and water;Sterile normal saline 04/12/22 1400   Dressing Applied;Hydrofiber;Compression wrap 04/12/22 1400   Periwound Care Absorptive dressing applied;Topical treatment applied 04/12/22 1400   Compression Two layer compression 04/12/22 1400   Dressing Change Due 04/26/22 04/12/22 1400         Assessment:         ICD-10-CM ICD-9-CM   1. Venous stasis ulcer of left ankle with fat layer exposed with varicose veins  I83.023 454.0    L97.322          Plan:   Tissue pathology and/or culture taken:  [] Yes [x] No   Sharp debridement performed:   [x] Yes [] No   Labs ordered this visit:   [] Yes [x] No   Imaging ordered this visit:   [] Yes [x] No           Orders Placed This Encounter   Procedures    Debridement     This order was created via procedure documentation     Standing Status:   Standing     Number of Occurrences:   1    Change dressing     Left lateral ankle venous ulcer:     Cleanse with: Normal saline   Lidocaine: prn   Silver nitrate: prn   Periwound care: Betamethasone and antifungal cream    Primary dressing: Hydrofera ready   Secondary dressing: Co-flex with zinc LLE toes to knee   Edema control: LLE Co-flex with zinc toes to knee   Frequency: Weekly in clinic   Follow-up: Dr. Lott 4/26/22        Follow up in about 2 weeks (around 4/26/2022) for .        Wound debridement and dressing per attached note.    Local wound care per above.    Continue activity restrictions and modifications    Elevation at rest.    Wound assessed and is progressing well with current treatment regimen. Anticipate wound closure within 1-2 visits.

## 2022-04-12 NOTE — PROCEDURES
"Wound Debridement    Date/Time: 4/12/2022 2:21 PM  Performed by: Phil Lott DPM  Authorized by: Phil Lott DPM     Time out: Immediately prior to procedure a "time out" was called to verify the correct patient, procedure, equipment, support staff and site/side marked as required.    Consent Done?:  Yes (Written)    Preparation: Patient was prepped and draped in usual sterile fashion    Local anesthesia used?: Yes    Local anesthetic:  Topical anesthetic    Wound Details:    Location:  Left leg (distal lateral near ankle)    Type of Debridement:  Excisional       Length (cm):  0.6       Area (sq cm):  0.12       Width (cm):  0.2       Percent Debrided (%):  100       Depth (cm):  0.1       Total Area Debrided (sq cm):  0.12    Depth of debridement:  Subcutaneous tissue    Tissue debrided:  Subcutaneous    Devitalized tissue debrided:  Slough, Fibrin and Callus    Instruments:  Curette    Bleeding:  Minimal  Hemostasis Achieved: Yes    Method Used:  Pressure  Patient tolerance:  Patient tolerated the procedure well with no immediate complications      "

## 2022-04-19 ENCOUNTER — HOSPITAL ENCOUNTER (OUTPATIENT)
Dept: WOUND CARE | Facility: HOSPITAL | Age: 62
Discharge: HOME | End: 2022-04-19
Attending: PODIATRIST
Payer: COMMERCIAL

## 2022-04-19 DIAGNOSIS — L97.322 VENOUS STASIS ULCER OF LEFT ANKLE WITH FAT LAYER EXPOSED WITH VARICOSE VEINS: Primary | ICD-10-CM

## 2022-04-19 DIAGNOSIS — I83.023 VENOUS STASIS ULCER OF LEFT ANKLE WITH FAT LAYER EXPOSED WITH VARICOSE VEINS: Primary | ICD-10-CM

## 2022-04-19 PROCEDURE — 29581 APPL MULTLAYER CMPRN SYS LEG: CPT

## 2022-04-19 NOTE — PROGRESS NOTES
Subjective:       Patient ID: Kevan Norris is a 61 y.o. male.    Chief Complaint: Wound Care    Follow up nurse visit for dressing change. No new complaints from patient today. Contuinue with current plan of care.    Review of Systems      Objective:         Physical Exam       Wound 02/01/22 1300 Venous Ulcer Left lateral;anterior Malleolus/Ankle (Active)   02/01/22 1300    Pre-existing: Yes   Primary Wound Type: Venous ulcer   Side: Left   Orientation: lateral;anterior   Location: Malleolus/Ankle   Wound Number:    Ankle-Brachial Index:    Pulses:    Removal Indication and Assessment:    Wound Outcome:    (Retired) Wound Type:    (Retired) Wound Length (cm):    (Retired) Wound Width (cm):    (Retired) Depth (cm):    Wound Description (Comments):    Removal Indications:    Wound Image   04/19/22 1100   Dressing Appearance Intact;Moist drainage 04/19/22 1100   Drainage Amount Scant 04/19/22 1100   Drainage Characteristics/Odor Serosanguineous 04/19/22 1100   Appearance Pink;Dry 04/19/22 1100   Tissue loss description Partial thickness 04/19/22 1100   Red (%), Wound Tissue Color 100 % 04/19/22 1100   Periwound Area Dry 04/19/22 1100   Wound Edges Undefined 04/19/22 1100   Wound Length (cm) 0.1 cm 04/19/22 1100   Wound Width (cm) 0.1 cm 04/19/22 1100   Wound Depth (cm) 0.1 cm 04/19/22 1100   Wound Volume (cm^3) 0.001 cm^3 04/19/22 1100   Wound Surface Area (cm^2) 0.01 cm^2 04/19/22 1100   Care Cleansed with:;Sterile normal saline 04/19/22 1100   Dressing Applied;Foam;Compression wrap 04/19/22 1100   Periwound Care Topical treatment applied 04/19/22 1100   Compression Two layer compression 04/19/22 1100   Dressing Change Due 04/26/22 04/19/22 1100             Assessment:         ICD-10-CM ICD-9-CM   1. Venous stasis ulcer of left ankle with fat layer exposed with varicose veins  I83.023 454.0    L97.322          Plan:   Tissue pathology and/or culture taken:  [] Yes [x] No   Sharp debridement performed:   []  Yes [x] No   Labs ordered this visit:   [] Yes [x] No   Imaging ordered this visit:   [] Yes [x] No         Left lateral ankle venous ulcer:     Cleanse with: Normal saline   Lidocaine: prn   Silver nitrate: prn   Periwound care: Betamethasone and antifungal cream   Primary dressing: Hydrofera ready   Secondary dressing: Co-flex with zinc LLE toes to knee   Edema control: LLE Co-flex with zinc toes to knee   Frequency: Weekly in clinic       Follow up in about 1 week (around 4/26/2022) for Dr. Lott.

## 2022-04-26 ENCOUNTER — HOSPITAL ENCOUNTER (OUTPATIENT)
Dept: WOUND CARE | Facility: HOSPITAL | Age: 62
Discharge: HOME | End: 2022-04-26
Attending: PODIATRIST
Payer: COMMERCIAL

## 2022-04-26 VITALS
WEIGHT: 240 LBS | BODY MASS INDEX: 27.77 KG/M2 | HEIGHT: 78 IN | HEART RATE: 71 BPM | TEMPERATURE: 98 F | SYSTOLIC BLOOD PRESSURE: 180 MMHG | DIASTOLIC BLOOD PRESSURE: 100 MMHG

## 2022-04-26 DIAGNOSIS — Z86.79 HEALED VENOUS ULCER OF LOWER EXTREMITY: Primary | ICD-10-CM

## 2022-04-26 PROCEDURE — 99212 OFFICE O/P EST SF 10 MIN: CPT | Mod: ,,, | Performed by: PODIATRIST

## 2022-04-26 PROCEDURE — 99213 OFFICE O/P EST LOW 20 MIN: CPT

## 2022-04-26 PROCEDURE — 99212 PR OFFICE/OUTPT VISIT, EST, LEVL II, 10-19 MIN: ICD-10-PCS | Mod: ,,, | Performed by: PODIATRIST

## 2022-04-26 NOTE — PROGRESS NOTES
Subjective:       Patient ID: Kevan Norris is a 61 y.o. male.    Chief Complaint: Venous Ulcer (Left lateral ankle)    22: F/U with Dr. Lott. Left lateral ankle site resolved. Protective dressing applied to site. Patient instructed to keep protective dressing to site x 1 week. Begin to wear own above knee compression stocking and own shoe. Discharged from clinic today. Return as needed.    Past Medical History:   Diagnosis Date    Blood clot in vein     Right Leg,     Diabetes mellitus     Diabetes mellitus, type 2     Gout flare     Hyperlipemia     Prostate atrophy     Varicose veins        Past Surgical History:   Procedure Laterality Date    VARICOSE VEIN SURGERY         Family History   Problem Relation Age of Onset    Diabetes Father     Diabetes Sister        Social History     Socioeconomic History    Marital status: Single   Tobacco Use    Smoking status: Former Smoker     Years: 6.00     Quit date:      Years since quittin.3    Smokeless tobacco: Never Used   Substance and Sexual Activity    Alcohol use: Yes     Comment: drinks beer, 2 cans, about 2x/week    Drug use: No    Sexual activity: Not Currently   Social History Narrative    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 Two sisters, two brothers live nearby. One sister lives in [place]. One dog at home.        Current Outpatient Medications   Medication Sig Dispense Refill    aspirin 81 mg Cap Take by mouth.      cyanocobalamin (VITAMIN B-12) 1000 MCG tablet Take 1 tablet (1,000 mcg total) by mouth once daily. 90 tablet 5    ergocalciferol (ERGOCALCIFEROL) 50,000 unit Cap TAKE 1 CAPSULE BY MOUTH EVERY 7 DAYS. THEN START DAILY OTC REPLACEMENT AFTER THIS RX IS COMPLETE 12 capsule 0    fluorouraciL (EFUDEX) 5 % cream Apply thin film to left lower forearm 2times per day for 3 weeks; d/c if area bleeding or ulcerated; avoid eyes or mouth 40 g 1    FLUZONE QUAD 9302-2464, PF, 60 mcg (15  mcg x 4)/0.5 mL Syrg       gentamicin (GARAMYCIN) 0.1 % ointment Apply topically.      ketoconazole (NIZORAL) 2 % cream AAA bid x 3 weeks 60 g 3    losartan (COZAAR) 50 MG tablet Take 1 tablet (50 mg total) by mouth once daily. 90 tablet 1    rosuvastatin (CRESTOR) 5 MG tablet TAKE 1 TABLET BY MOUTH EVERY DAY 90 tablet 3     No current facility-administered medications for this encounter.       Review of patient's allergies indicates:  No Known Allergies    Review of Systems   Constitutional: Negative for chills and fever.   HENT: Negative for congestion and hearing loss.    Respiratory: Negative for cough and shortness of breath.    Cardiovascular: Positive for leg swelling. Negative for chest pain.   Gastrointestinal: Negative for nausea and vomiting.   Skin: Positive for color change and wound.   Neurological: Negative for dizziness and numbness.   Psychiatric/Behavioral: Negative for agitation.         Objective:      Temp:  [98.2 °F (36.8 °C)]   Pulse:  [71]   BP: (180)/(100)   Physical Exam  Constitutional:       General: He is not in acute distress.     Appearance: He is not ill-appearing.   Cardiovascular:      Pulses:           Dorsalis pedis pulses are detected w/ Doppler on the left side.        Posterior tibial pulses are detected w/ Doppler on the left side.      Comments: Bilateral lower extremity edema L>R with hemosiderin staining of the legs, + varicosities. No hair growth bilateral lower extremity.   Musculoskeletal:      Comments: Localized pain on palpation of wound site lateral left ankle. No palpable fluctuance or crepitance.    Skin:     General: Skin is warm.      Capillary Refill: Capillary refill takes 2 to 3 seconds.      Findings: No ecchymosis or erythema.      Nails: There is no clubbing.      Comments: Refer to wound description below     Neurological:      Mental Status: He is alert.            Wound 02/01/22 1300 Venous Ulcer Left lateral;anterior Malleolus/Ankle (Active)    02/01/22 1300    Pre-existing: Yes   Primary Wound Type: Venous ulcer   Side: Left   Orientation: lateral;anterior   Location: Malleolus/Ankle   Wound Number:    Ankle-Brachial Index:    Pulses:    Removal Indication and Assessment:    Wound Outcome:    (Retired) Wound Type:    (Retired) Wound Length (cm):    (Retired) Wound Width (cm):    (Retired) Depth (cm):    Wound Description (Comments):    Removal Indications:    Wound Image   04/26/22 1500   Dressing Appearance Intact;Dry 04/26/22 1500   Drainage Amount None 04/26/22 1500   Appearance Pink;Dry;Closed/resurfaced 04/26/22 1500   Tissue loss description Not applicable 04/26/22 1500   Red (%), Wound Tissue Color 100 % 04/26/22 1500   Periwound Area Dry 04/26/22 1500   Wound Edges Rolled/closed 04/26/22 1500   Wound Length (cm) 0 cm 04/26/22 1500   Wound Width (cm) 0 cm 04/26/22 1500   Wound Depth (cm) 0 cm 04/26/22 1500   Wound Volume (cm^3) 0 cm^3 04/26/22 1500   Wound Surface Area (cm^2) 0 cm^2 04/26/22 1500   Care Cleansed with:;Soap and water 04/26/22 1500   Dressing Applied;Island/border 04/26/22 1500         Assessment:         ICD-10-CM ICD-9-CM   1. Healed venous ulcer of lower extremity  Z86.79 V12.59         Plan:   Tissue pathology and/or culture taken:  [] Yes [x] No   Sharp debridement performed:   [] Yes [x] No   Labs ordered this visit:   [] Yes [x] No   Imaging ordered this visit:   [] Yes [x] No           Orders Placed This Encounter   Procedures    Change dressing     Left lateral ankle wound is resolved.  Protective bordered foam to site x 1 week.  Return to wearing above knee compression stockings.  Return as needed.        Follow up return as needed.          Discussed returning into thigh high compression stockings and monitoring wound site. Patient to protect skin with border dressings.    RTC prn as discussed.

## 2022-06-07 ENCOUNTER — LAB VISIT (OUTPATIENT)
Dept: LAB | Facility: HOSPITAL | Age: 62
End: 2022-06-07
Attending: FAMILY MEDICINE
Payer: COMMERCIAL

## 2022-06-07 DIAGNOSIS — D51.8 OTHER VITAMIN B12 DEFICIENCY ANEMIA: ICD-10-CM

## 2022-06-07 DIAGNOSIS — E78.49 OTHER HYPERLIPIDEMIA: ICD-10-CM

## 2022-06-07 LAB
BASOPHILS # BLD AUTO: 0.07 K/UL (ref 0–0.2)
BASOPHILS NFR BLD: 0.9 % (ref 0–1.9)
CHOLEST SERPL-MCNC: 173 MG/DL (ref 120–199)
CHOLEST/HDLC SERPL: 4.4 {RATIO} (ref 2–5)
DIFFERENTIAL METHOD: ABNORMAL
EOSINOPHIL # BLD AUTO: 0.3 K/UL (ref 0–0.5)
EOSINOPHIL NFR BLD: 3.5 % (ref 0–8)
ERYTHROCYTE [DISTWIDTH] IN BLOOD BY AUTOMATED COUNT: 13.4 % (ref 11.5–14.5)
FERRITIN SERPL-MCNC: 67 NG/ML (ref 20–300)
HCT VFR BLD AUTO: 43.6 % (ref 40–54)
HDLC SERPL-MCNC: 39 MG/DL (ref 40–75)
HDLC SERPL: 22.5 % (ref 20–50)
HGB BLD-MCNC: 13.5 G/DL (ref 14–18)
IMM GRANULOCYTES # BLD AUTO: 0.03 K/UL (ref 0–0.04)
IMM GRANULOCYTES NFR BLD AUTO: 0.4 % (ref 0–0.5)
IRON SERPL-MCNC: 80 UG/DL (ref 45–160)
LDLC SERPL CALC-MCNC: 96.4 MG/DL (ref 63–159)
LYMPHOCYTES # BLD AUTO: 2 K/UL (ref 1–4.8)
LYMPHOCYTES NFR BLD: 26 % (ref 18–48)
MCH RBC QN AUTO: 28.5 PG (ref 27–31)
MCHC RBC AUTO-ENTMCNC: 31 G/DL (ref 32–36)
MCV RBC AUTO: 92 FL (ref 82–98)
MONOCYTES # BLD AUTO: 0.7 K/UL (ref 0.3–1)
MONOCYTES NFR BLD: 9 % (ref 4–15)
NEUTROPHILS # BLD AUTO: 4.7 K/UL (ref 1.8–7.7)
NEUTROPHILS NFR BLD: 60.2 % (ref 38–73)
NONHDLC SERPL-MCNC: 134 MG/DL
NRBC BLD-RTO: 0 /100 WBC
PLATELET # BLD AUTO: 260 K/UL (ref 150–450)
PMV BLD AUTO: 10.7 FL (ref 9.2–12.9)
RBC # BLD AUTO: 4.74 M/UL (ref 4.6–6.2)
SATURATED IRON: 20 % (ref 20–50)
TOTAL IRON BINDING CAPACITY: 395 UG/DL (ref 250–450)
TRANSFERRIN SERPL-MCNC: 267 MG/DL (ref 200–375)
TRIGL SERPL-MCNC: 188 MG/DL (ref 30–150)
VIT B12 SERPL-MCNC: 311 PG/ML (ref 210–950)
WBC # BLD AUTO: 7.8 K/UL (ref 3.9–12.7)

## 2022-06-07 PROCEDURE — 82728 ASSAY OF FERRITIN: CPT | Performed by: FAMILY MEDICINE

## 2022-06-07 PROCEDURE — 84466 ASSAY OF TRANSFERRIN: CPT | Performed by: FAMILY MEDICINE

## 2022-06-07 PROCEDURE — 36415 COLL VENOUS BLD VENIPUNCTURE: CPT | Mod: PO | Performed by: FAMILY MEDICINE

## 2022-06-07 PROCEDURE — 85025 COMPLETE CBC W/AUTO DIFF WBC: CPT | Performed by: FAMILY MEDICINE

## 2022-06-07 PROCEDURE — 80061 LIPID PANEL: CPT | Performed by: FAMILY MEDICINE

## 2022-06-07 PROCEDURE — 82607 VITAMIN B-12: CPT | Performed by: FAMILY MEDICINE

## 2022-06-15 ENCOUNTER — OFFICE VISIT (OUTPATIENT)
Dept: FAMILY MEDICINE | Facility: CLINIC | Age: 62
End: 2022-06-15
Payer: COMMERCIAL

## 2022-06-15 VITALS
WEIGHT: 244.5 LBS | DIASTOLIC BLOOD PRESSURE: 88 MMHG | SYSTOLIC BLOOD PRESSURE: 130 MMHG | OXYGEN SATURATION: 96 % | HEART RATE: 70 BPM | TEMPERATURE: 98 F | BODY MASS INDEX: 28.29 KG/M2 | HEIGHT: 78 IN

## 2022-06-15 DIAGNOSIS — Z12.11 COLON CANCER SCREENING: ICD-10-CM

## 2022-06-15 DIAGNOSIS — I10 ESSENTIAL HYPERTENSION: ICD-10-CM

## 2022-06-15 DIAGNOSIS — E78.49 OTHER HYPERLIPIDEMIA: ICD-10-CM

## 2022-06-15 DIAGNOSIS — E55.9 VITAMIN D DEFICIENCY: ICD-10-CM

## 2022-06-15 DIAGNOSIS — Z00.00 VISIT FOR WELL MAN HEALTH CHECK: ICD-10-CM

## 2022-06-15 DIAGNOSIS — D51.8 OTHER VITAMIN B12 DEFICIENCY ANEMIA: ICD-10-CM

## 2022-06-15 DIAGNOSIS — I83.812 VARICOSE VEINS OF LEFT LOWER EXTREMITY WITH PAIN: Primary | ICD-10-CM

## 2022-06-15 PROBLEM — I87.2 VENOUS INSUFFICIENCY OF LOWER EXTREMITY: Status: ACTIVE | Noted: 2022-06-15

## 2022-06-15 PROCEDURE — 3008F PR BODY MASS INDEX (BMI) DOCUMENTED: ICD-10-PCS | Mod: CPTII,S$GLB,, | Performed by: FAMILY MEDICINE

## 2022-06-15 PROCEDURE — 3008F BODY MASS INDEX DOCD: CPT | Mod: CPTII,S$GLB,, | Performed by: FAMILY MEDICINE

## 2022-06-15 PROCEDURE — 99214 PR OFFICE/OUTPT VISIT, EST, LEVL IV, 30-39 MIN: ICD-10-PCS | Mod: S$GLB,,, | Performed by: FAMILY MEDICINE

## 2022-06-15 PROCEDURE — 3075F PR MOST RECENT SYSTOLIC BLOOD PRESS GE 130-139MM HG: ICD-10-PCS | Mod: CPTII,S$GLB,, | Performed by: FAMILY MEDICINE

## 2022-06-15 PROCEDURE — 1159F MED LIST DOCD IN RCRD: CPT | Mod: CPTII,S$GLB,, | Performed by: FAMILY MEDICINE

## 2022-06-15 PROCEDURE — 1159F PR MEDICATION LIST DOCUMENTED IN MEDICAL RECORD: ICD-10-PCS | Mod: CPTII,S$GLB,, | Performed by: FAMILY MEDICINE

## 2022-06-15 PROCEDURE — 99999 PR PBB SHADOW E&M-EST. PATIENT-LVL IV: ICD-10-PCS | Mod: PBBFAC,,, | Performed by: FAMILY MEDICINE

## 2022-06-15 PROCEDURE — 3075F SYST BP GE 130 - 139MM HG: CPT | Mod: CPTII,S$GLB,, | Performed by: FAMILY MEDICINE

## 2022-06-15 PROCEDURE — 3079F PR MOST RECENT DIASTOLIC BLOOD PRESSURE 80-89 MM HG: ICD-10-PCS | Mod: CPTII,S$GLB,, | Performed by: FAMILY MEDICINE

## 2022-06-15 PROCEDURE — 99999 PR PBB SHADOW E&M-EST. PATIENT-LVL IV: CPT | Mod: PBBFAC,,, | Performed by: FAMILY MEDICINE

## 2022-06-15 PROCEDURE — 1160F PR REVIEW ALL MEDS BY PRESCRIBER/CLIN PHARMACIST DOCUMENTED: ICD-10-PCS | Mod: CPTII,S$GLB,, | Performed by: FAMILY MEDICINE

## 2022-06-15 PROCEDURE — 1160F RVW MEDS BY RX/DR IN RCRD: CPT | Mod: CPTII,S$GLB,, | Performed by: FAMILY MEDICINE

## 2022-06-15 PROCEDURE — 4010F PR ACE/ARB THEARPY RXD/TAKEN: ICD-10-PCS | Mod: CPTII,S$GLB,, | Performed by: FAMILY MEDICINE

## 2022-06-15 PROCEDURE — 3079F DIAST BP 80-89 MM HG: CPT | Mod: CPTII,S$GLB,, | Performed by: FAMILY MEDICINE

## 2022-06-15 PROCEDURE — 4010F ACE/ARB THERAPY RXD/TAKEN: CPT | Mod: CPTII,S$GLB,, | Performed by: FAMILY MEDICINE

## 2022-06-15 PROCEDURE — 99214 OFFICE O/P EST MOD 30 MIN: CPT | Mod: S$GLB,,, | Performed by: FAMILY MEDICINE

## 2022-06-15 RX ORDER — LOSARTAN POTASSIUM 100 MG/1
100 TABLET ORAL NIGHTLY
Qty: 90 TABLET | Refills: 1 | Status: SHIPPED | OUTPATIENT
Start: 2022-06-15 | End: 2022-12-15 | Stop reason: SDUPTHER

## 2022-06-15 RX ORDER — ERGOCALCIFEROL 1.25 MG/1
CAPSULE ORAL
Qty: 12 CAPSULE | Refills: 3 | Status: SHIPPED | OUTPATIENT
Start: 2022-06-15 | End: 2022-12-15 | Stop reason: SDUPTHER

## 2022-06-15 RX ORDER — LOSARTAN POTASSIUM 50 MG/1
50 TABLET ORAL NIGHTLY
Qty: 90 TABLET | Refills: 1 | Status: SHIPPED | OUTPATIENT
Start: 2022-06-15 | End: 2022-06-15 | Stop reason: SDUPTHER

## 2022-06-15 RX ORDER — LANOLIN ALCOHOL/MO/W.PET/CERES
2000 CREAM (GRAM) TOPICAL DAILY
Qty: 90 TABLET | Refills: 5
Start: 2022-06-15 | End: 2022-12-15 | Stop reason: SDUPTHER

## 2022-06-15 RX ORDER — LANOLIN ALCOHOL/MO/W.PET/CERES
1000 CREAM (GRAM) TOPICAL DAILY
Qty: 90 TABLET | Refills: 5 | Status: SHIPPED | OUTPATIENT
Start: 2022-06-15 | End: 2022-06-15

## 2022-06-15 RX ORDER — ROSUVASTATIN CALCIUM 5 MG/1
5 TABLET, COATED ORAL DAILY
Qty: 90 TABLET | Refills: 3 | Status: SHIPPED | OUTPATIENT
Start: 2022-06-15 | End: 2022-12-15 | Stop reason: SDUPTHER

## 2022-06-15 NOTE — PROGRESS NOTES
"psaSubjective:       Patient ID: Kevan Norris is a 61 y.o. male.    Chief Complaint: Hypertension    Kevan is a 61 y.o. male who presents today for f/u    HTN: restarted losartan 12/2/2021. No light headedness. No dizziness. Has stopped taking for a bit, but has been trying to be more consistent recently. Took it last night. Mostly takes it at least 5-6 days/week.      b12 def: improving but still low  DLD: on statin, started 12/2021  Venous stasis ulcer: was seeing wound care. Has been d/c. No lesions currently. Needs new compression socks. Reviewed last vascular surgery note.     Arm lesions: seeing dermatology, last seen 3/2022. Needs f/u. Message sent.     Hematuria: saw urology, had repeat cystoscopy 1/18/2022. Per that note, "If still microscopic hematuria present in 5 years can consider repeat workup (1/2027)"    Anemia: improving with b12.     Review of Systems   Constitutional: Negative for chills and fever.   Respiratory: Negative for shortness of breath.    Cardiovascular: Negative for chest pain.   Gastrointestinal: Negative for nausea and vomiting.   Skin: Negative for rash and wound.   Neurological: Negative for dizziness, light-headedness and headaches.             Results for orders placed or performed in visit on 06/07/22   CBC Auto Differential   Result Value Ref Range    WBC 7.80 3.90 - 12.70 K/uL    RBC 4.74 4.60 - 6.20 M/uL    Hemoglobin 13.5 (L) 14.0 - 18.0 g/dL    Hematocrit 43.6 40.0 - 54.0 %    MCV 92 82 - 98 fL    MCH 28.5 27.0 - 31.0 pg    MCHC 31.0 (L) 32.0 - 36.0 g/dL    RDW 13.4 11.5 - 14.5 %    Platelets 260 150 - 450 K/uL    MPV 10.7 9.2 - 12.9 fL    Immature Granulocytes 0.4 0.0 - 0.5 %    Gran # (ANC) 4.7 1.8 - 7.7 K/uL    Immature Grans (Abs) 0.03 0.00 - 0.04 K/uL    Lymph # 2.0 1.0 - 4.8 K/uL    Mono # 0.7 0.3 - 1.0 K/uL    Eos # 0.3 0.0 - 0.5 K/uL    Baso # 0.07 0.00 - 0.20 K/uL    nRBC 0 0 /100 WBC    Gran % 60.2 38.0 - 73.0 %    Lymph % 26.0 18.0 - 48.0 %    Mono % " "9.0 4.0 - 15.0 %    Eosinophil % 3.5 0.0 - 8.0 %    Basophil % 0.9 0.0 - 1.9 %    Differential Method Automated    Iron and TIBC   Result Value Ref Range    Iron 80 45 - 160 ug/dL    Transferrin 267 200 - 375 mg/dL    TIBC 395 250 - 450 ug/dL    Saturated Iron 20 20 - 50 %   Ferritin   Result Value Ref Range    Ferritin 67 20.0 - 300.0 ng/mL   Vitamin B12   Result Value Ref Range    Vitamin B-12 311 210 - 950 pg/mL   Lipid Panel   Result Value Ref Range    Cholesterol 173 120 - 199 mg/dL    Triglycerides 188 (H) 30 - 150 mg/dL    HDL 39 (L) 40 - 75 mg/dL    LDL Cholesterol 96.4 63.0 - 159.0 mg/dL    HDL/Cholesterol Ratio 22.5 20.0 - 50.0 %    Total Cholesterol/HDL Ratio 4.4 2.0 - 5.0    Non-HDL Cholesterol 134 mg/dL       Objective:     Vitals:    06/15/22 1038 06/15/22 1043   BP: (!) 136/100 130/88   BP Location: Right arm    Patient Position: Sitting    BP Method: Medium (Manual)    Pulse: 70    Temp: 97.5 °F (36.4 °C)    TempSrc: Oral    SpO2: 96%    Weight: 110.9 kg (244 lb 7.8 oz)    Height: 6' 7" (2.007 m)           Physical Exam  Constitutional:       General: He is not in acute distress.     Appearance: He is not ill-appearing, toxic-appearing or diaphoretic.   Cardiovascular:      Rate and Rhythm: Normal rate and regular rhythm.   Pulmonary:      Effort: Pulmonary effort is normal.      Breath sounds: Normal breath sounds.   Abdominal:      Palpations: Abdomen is soft.   Musculoskeletal:      Comments: Left leg in compression sock  Exam deferred today   Neurological:      Mental Status: He is alert.   Psychiatric:         Mood and Affect: Mood normal.         Behavior: Behavior normal.         Thought Content: Thought content normal.         Judgment: Judgment normal.         Assessment:       1. Varicose veins of left lower extremity with pain    2. Other hyperlipidemia    3. Essential hypertension    4. Other vitamin B12 deficiency anemia    5. Vitamin D deficiency    6. Colon cancer screening    7. Visit " for well man health check        Plan:       Please take b12 2000 mcg daily    Continue losartan but at 100 mg for BP  Continue crestor for cholesterol    You have low vitamin D and a Rx has been sent to your pharmacy. Take this pill once a week and when the prescription and refills are done, start taking an OTC vitamin D supplementation at 1000 IU daily.      F/u 6 months, labs prior.       Varicose veins of left lower extremity with pain  -     COMPRESSION STOCKINGS    Other hyperlipidemia  -     rosuvastatin (CRESTOR) 5 MG tablet; Take 1 tablet (5 mg total) by mouth once daily. For cholesterol  Dispense: 90 tablet; Refill: 3  -     Lipid Panel; Future; Expected date: 06/15/2022    Essential hypertension  -     Discontinue: losartan (COZAAR) 50 MG tablet; Take 1 tablet (50 mg total) by mouth every evening. For blood pressure.  Dispense: 90 tablet; Refill: 1  -     losartan (COZAAR) 100 MG tablet; Take 1 tablet (100 mg total) by mouth every evening. For blood pressure.  Dispense: 90 tablet; Refill: 1  -     CBC Auto Differential; Future; Expected date: 06/15/2022  -     Comprehensive Metabolic Panel; Future; Expected date: 06/15/2022  -     TSH; Future; Expected date: 06/15/2022    Other vitamin B12 deficiency anemia  -     Discontinue: cyanocobalamin (VITAMIN B-12) 1000 MCG tablet; Take 1 tablet (1,000 mcg total) by mouth once daily.  Dispense: 90 tablet; Refill: 5  -     Vitamin B12; Future; Expected date: 06/15/2022  -     FOLATE; Future; Expected date: 06/15/2022  -     Sedimentation rate; Future; Expected date: 06/15/2022  -     C-Reactive Protein; Future; Expected date: 06/15/2022  -     cyanocobalamin (VITAMIN B-12) 1000 MCG tablet; Take 2 tablets (2,000 mcg total) by mouth once daily.  Dispense: 90 tablet; Refill: 5    Vitamin D deficiency  -     ergocalciferol (ERGOCALCIFEROL) 50,000 unit Cap; TAKE 1 CAPSULE BY MOUTH EVERY 7 DAYS. THEN START DAILY OTC REPLACEMENT AFTER THIS RX IS COMPLETE  Dispense: 12  capsule; Refill: 3  -     Vitamin D; Future; Expected date: 06/15/2022    Colon cancer screening  -     Case Request Endoscopy: COLONOSCOPY    Visit for [facility] health check  -     CBC Auto Differential; Future; Expected date: 06/15/2022  -     Comprehensive Metabolic Panel; Future; Expected date: 06/15/2022  -     Hemoglobin A1C; Future; Expected date: 06/15/2022  -     Lipid Panel; Future; Expected date: 06/15/2022  -     TSH; Future; Expected date: 06/15/2022  -     Vitamin D; Future; Expected date: 06/15/2022  -     Vitamin B12; Future; Expected date: 06/15/2022  -     FOLATE; Future; Expected date: 06/15/2022  -     Sedimentation rate; Future; Expected date: 06/15/2022  -     C-Reactive Protein; Future; Expected date: 06/15/2022  -     PSA, Screening; Future; Expected date: 06/15/2022        Warning signs discussed, patient to call with any further issues or worsening of symptoms.

## 2022-06-15 NOTE — PATIENT INSTRUCTIONS
Please take b12 2000 mcg daily    Continue losartan but at 100 mg for BP  Continue crestor for cholesterol    You have low vitamin D and a Rx has been sent to your pharmacy. Take this pill once a week and when the prescription and refills are done, start taking an OTC vitamin D supplementation at 1000 IU daily.      F/u 6 months, labs prior.         We want to make sure that you do miss out on screening for colon cancer. If you are having any issues with scheduling your colonoscopy please do not hesitate to reach out to our staff who can directly help you with getting this scheduled.  The number is [phone]

## 2022-06-20 ENCOUNTER — TELEPHONE (OUTPATIENT)
Dept: FAMILY MEDICINE | Facility: CLINIC | Age: 62
End: 2022-06-20
Payer: COMMERCIAL

## 2022-06-20 NOTE — TELEPHONE ENCOUNTER
Called patient to let him know a dermatology appointment has been schedule on 09/12/22 at 9 am. Patient understating

## 2022-06-20 NOTE — TELEPHONE ENCOUNTER
----- Message from Jose A Ott DO sent at 6/15/2022 11:46 AM CDT -----  Please let patient know about his upcoming derm apt  ----- Message -----  From: Rowena Aquino LPN  Sent: 6/15/2022  11:39 AM CDT  To: Jose A Ott DO    Good morning, pt has been scheduled.  ----- Message -----  From: Jose A Ott DO  Sent: 6/15/2022  11:13 AM CDT  To: [facility] Staff    Can you set up a f/u in September?    KIYA

## 2022-07-09 ENCOUNTER — NURSE TRIAGE (OUTPATIENT)
Dept: ADMINISTRATIVE | Facility: CLINIC | Age: 62
End: 2022-07-09
Payer: COMMERCIAL

## 2022-07-09 NOTE — TELEPHONE ENCOUNTER
Reason for Disposition   Nursing judgment, per information in Reference    Protocols used: NO GUIDELINE [id number]  pt states not feeling well today. pt states cut his grass yest. sibling and spouse has a 'bug going on'. Pt reports he was sent home form work today. pt wondering if he is having a stroke or a bug. rec to call EMS. Pt agrees. call back with questions

## 2022-08-19 ENCOUNTER — TELEPHONE (OUTPATIENT)
Dept: ENDOSCOPY | Facility: HOSPITAL | Age: 62
End: 2022-08-19
Payer: COMMERCIAL

## 2022-08-22 ENCOUNTER — TELEPHONE (OUTPATIENT)
Dept: ENDOSCOPY | Facility: HOSPITAL | Age: 62
End: 2022-08-22
Payer: COMMERCIAL

## 2022-08-22 RX ORDER — POLYETHYLENE GLYCOL 3350, SODIUM SULFATE ANHYDROUS, SODIUM BICARBONATE, SODIUM CHLORIDE, POTASSIUM CHLORIDE 236; 22.74; 6.74; 5.86; 2.97 G/4L; G/4L; G/4L; G/4L; G/4L
4 POWDER, FOR SOLUTION ORAL ONCE
Qty: 4000 ML | Refills: 0 | Status: SHIPPED | OUTPATIENT
Start: 2022-08-22 | End: 2022-08-22

## 2022-08-22 NOTE — TELEPHONE ENCOUNTER
Endoscopy Scheduling Questionnaire:         1. Are you taking any blood thinners? No               If Yes  Have you been on them for longer than one year?     2. Have you been diagnosed with Diverticulitis in past three months?  No    3. Are you on dialysis or have Kidney Disease? No    4. Previous Colonoscopy?  Yes         If yes Do you have a history of colon polyps?  Yes      6. Are you a diabetic?  No    7. Do you have a history of constipation?  No      Procedure scheduled with Dr. Morillo on  10/16/2022    The prep being used is Golytely     The patient's prep instructions were sent by mail

## 2022-09-12 ENCOUNTER — OFFICE VISIT (OUTPATIENT)
Dept: DERMATOLOGY | Facility: CLINIC | Age: 62
End: 2022-09-12
Payer: COMMERCIAL

## 2022-09-12 DIAGNOSIS — D22.9 NEVUS: ICD-10-CM

## 2022-09-12 DIAGNOSIS — L91.8 SKIN TAG: ICD-10-CM

## 2022-09-12 DIAGNOSIS — L82.1 SK (SEBORRHEIC KERATOSIS): ICD-10-CM

## 2022-09-12 DIAGNOSIS — L57.0 AK (ACTINIC KERATOSIS): Primary | ICD-10-CM

## 2022-09-12 DIAGNOSIS — L81.4 LENTIGO: ICD-10-CM

## 2022-09-12 PROCEDURE — 4010F ACE/ARB THERAPY RXD/TAKEN: CPT | Mod: CPTII,S$GLB,, | Performed by: DERMATOLOGY

## 2022-09-12 PROCEDURE — 17000 PR DESTRUCTION(LASER SURGERY,CRYOSURGERY,CHEMOSURGERY),PREMALIGNANT LESIONS,FIRST LESION: ICD-10-PCS | Mod: S$GLB,,, | Performed by: DERMATOLOGY

## 2022-09-12 PROCEDURE — 17003 DESTRUCTION, PREMALIGNANT LESIONS; SECOND THROUGH 14 LESIONS: ICD-10-PCS | Mod: S$GLB,,, | Performed by: DERMATOLOGY

## 2022-09-12 PROCEDURE — 99214 OFFICE O/P EST MOD 30 MIN: CPT | Mod: 25,S$GLB,, | Performed by: DERMATOLOGY

## 2022-09-12 PROCEDURE — 1159F MED LIST DOCD IN RCRD: CPT | Mod: CPTII,S$GLB,, | Performed by: DERMATOLOGY

## 2022-09-12 PROCEDURE — 99999 PR PBB SHADOW E&M-EST. PATIENT-LVL III: CPT | Mod: PBBFAC,,, | Performed by: DERMATOLOGY

## 2022-09-12 PROCEDURE — 17003 DESTRUCT PREMALG LES 2-14: CPT | Mod: S$GLB,,, | Performed by: DERMATOLOGY

## 2022-09-12 PROCEDURE — 99214 PR OFFICE/OUTPT VISIT, EST, LEVL IV, 30-39 MIN: ICD-10-PCS | Mod: 25,S$GLB,, | Performed by: DERMATOLOGY

## 2022-09-12 PROCEDURE — 4010F PR ACE/ARB THEARPY RXD/TAKEN: ICD-10-PCS | Mod: CPTII,S$GLB,, | Performed by: DERMATOLOGY

## 2022-09-12 PROCEDURE — 1160F RVW MEDS BY RX/DR IN RCRD: CPT | Mod: CPTII,S$GLB,, | Performed by: DERMATOLOGY

## 2022-09-12 PROCEDURE — 99999 PR PBB SHADOW E&M-EST. PATIENT-LVL III: ICD-10-PCS | Mod: PBBFAC,,, | Performed by: DERMATOLOGY

## 2022-09-12 PROCEDURE — 1159F PR MEDICATION LIST DOCUMENTED IN MEDICAL RECORD: ICD-10-PCS | Mod: CPTII,S$GLB,, | Performed by: DERMATOLOGY

## 2022-09-12 PROCEDURE — 1160F PR REVIEW ALL MEDS BY PRESCRIBER/CLIN PHARMACIST DOCUMENTED: ICD-10-PCS | Mod: CPTII,S$GLB,, | Performed by: DERMATOLOGY

## 2022-09-12 PROCEDURE — 17000 DESTRUCT PREMALG LESION: CPT | Mod: S$GLB,,, | Performed by: DERMATOLOGY

## 2022-09-12 NOTE — PROGRESS NOTES
"  Subjective:       Patient ID:  Kevan Norris is a 62 y.o. male who presents for   Chief Complaint   Patient presents with    Skin Check     Patient is here today for a "mole" check.   Pt has a history of  extensive sun exposure in the past.   Pt recalls several blistering sunburns in the past- yes  Pt has history of tanning bed use- no  Pt has  had moles removed in the past- no  Pt has history of melanoma in first degree relatives-  no    Pt denies any new, dry, scaly or bleeding lesions.   Pt has bx proven ak on left forearm 12/2021 . He has been advised x 2 to use efudex  and he forgets      Review of Systems   Skin:  Positive for activity-related sunscreen use. Negative for daily sunscreen use, tendency to form keloidal scars and recent sunburn.   Hematologic/Lymphatic: Bruises/bleeds easily.      Objective:    Physical Exam   Constitutional: He appears well-developed and well-nourished. No distress.   Neurological: He is alert and oriented to person, place, and time. He is not disoriented.   Psychiatric: He has a normal mood and affect.   Skin:   Areas Examined (abnormalities noted in diagram):   Scalp / Hair Palpated and Inspected  RUE Inspected  LUE Inspection Performed                 Diagram Legend     Erythematous scaling macule/papule c/w actinic keratosis       Vascular papule c/w angioma      Pigmented verrucoid papule/plaque c/w seborrheic keratosis      Yellow umbilicated papule c/w sebaceous hyperplasia      Irregularly shaped tan macule c/w lentigo     1-2 mm smooth white papules consistent with Milia      Movable subcutaneous cyst with punctum c/w epidermal inclusion cyst      Subcutaneous movable cyst c/w pilar cyst      Firm pink to brown papule c/w dermatofibroma      Pedunculated fleshy papule(s) c/w skin tag(s)      Evenly pigmented macule c/w junctional nevus     Mildly variegated pigmented, slightly irregular-bordered macule c/w mildly atypical nevus      Flesh colored to evenly " pigmented papule c/w intradermal nevus       Pink pearly papule/plaque c/w basal cell carcinoma      Erythematous hyperkeratotic cursted plaque c/w SCC      Surgical scar with no sign of skin cancer recurrence      Open and closed comedones      Inflammatory papules and pustules      Verrucoid papule consistent consistent with wart     Erythematous eczematous patches and plaques     Dystrophic onycholytic nail with subungual debris c/w onychomycosis     Umbilicated papule    Erythematous-base heme-crusted tan verrucoid plaque consistent with inflamed seborrheic keratosis     Erythematous Silvery Scaling Plaque c/w Psoriasis     See annotation      Assessment / Plan:        AK (actinic keratosis)  Cryosurgery Procedure Note    Verbal consent from the patient is obtained including, but not limited to, risk of hypopigmentation/hyperpigmentation, scar, recurrence of lesion. The patient is aware of the precancerous quality and need for treatment of these lesions. Liquid nitrogen cryosurgery is applied to the 9 actinic keratoses, as detailed in the physical exam, to produce a freeze injury. The patient is aware that blisters may form and is instructed on wound care with gentle cleansing and use of vaseline ointment to keep moist until healed. The patient is supplied a handout on cryosurgery and is instructed to call if lesions do not completely resolve.    Efudex/Florouracil treatment: left forearm 2x per day for 21 days  Discussed to treat as directed and that area will be red, inflamed, and irritated which is a normal reaction.  Medication should be discontinued if area treated is ulcerated or bleeding.  Pt should wait 2 weeks to use medication if areas that are to be treated have also been treated with cryosurgery/freezing. Pt should avoid medication contacting eyes or mouth and wear sunscreen,  sun protective clothing, hat,  and sunglasses if going to be in the sun while undergoing treatment.  Pt can send photo or  call if concerned about reaction.      Skin tag  Reassurance given to patient. No treatment is necessary.   Treatment of benign, asymptomatic lesions may be considered cosmetic.    Nevus  Discussed ABCDE's of nevi.  Monitor for new mole or moles that are becoming bigger, darker, irritated, or developing irregular borders. Brochure provided. Instructed patient to observe lesion(s) for changes and follow up in clinic if changes are noted. Patient to monitor skin at home for new or changing lesions.     SK (seborrheic keratosis)  These are benign inherited growths without a malignant potential. Reassurance given to patient. No treatment is necessary.     Lentigo  This is a benign hyperpigmented sun induced lesion. Recommend daily sun protection/avoidance and use of at least SPF 30, broad spectrum sunscreen (OTC drug) will reduce the number of new lesions. Treatment of these benign lesions are considered cosmetic.  The nature of sun-induced photo-aging and skin cancers is discussed.  Sun avoidance, protective clothing, and the use of 30-SPF sunscreens is advised. Observe closely for skin damage/changes, and call if such occurs.    Upper body skin examination performed today including at least 6 points as noted in physical examination. No lesions suspicious for malignancy noted.    Recommend daily sun protection/avoidance and use of at least SPF 30, broad spectrum sunscreen (OTC drug).            Follow up in about 6 months (around 3/12/2023) for UBSE.

## 2022-09-12 NOTE — PATIENT INSTRUCTIONS
Efudex/Florouracil treatment: left forearm 2x per day for 21 days  Discussed to treat as directed and that area will be red, inflamed, and irritated which is a normal reaction.  Medication should be discontinued if area treated is ulcerated or bleeding.  Pt should wait 2 weeks to use medication if areas that are to be treated have also been treated with cryosurgery/freezing. Pt should avoid medication contacting eyes or mouth and wear sunscreen,  sun protective clothing, hat,  and sunglasses if going to be in the sun while undergoing treatment.  Pt can send photo or call if concerned about reaction.

## 2022-10-11 ENCOUNTER — TELEPHONE (OUTPATIENT)
Dept: ENDOSCOPY | Facility: HOSPITAL | Age: 62
End: 2022-10-11
Payer: COMMERCIAL

## 2022-10-11 NOTE — TELEPHONE ENCOUNTER
Spoke with patient about arrival time @ 0800.   Covid test = vacc    Prep instructions reviewed: the day before the procedure, follow a clear liquid diet all day, then start the first 1/2 of prep at 5pm and take 2nd 1/2 of prep @ 0300.  Pt must be completely NPO when prep completed @ 0500.              Medications: Do not take Insulin or oral diabetic medications the day of the procedure.  Take as prescribed: heart, seizure and blood pressure medication in the morning with a sip of water (less than an ounce).  Take any breathing medications and bring inhalers to hospital with you Leave all valuables and jewelry at home.     Wear comfortable clothes to procedure to change into hospital gown You cannot drive for 24 hours after your procedure because you will receive sedation for your procedure to make you comfortable.  A ride must be provided at discharge.

## 2022-10-13 ENCOUNTER — ANESTHESIA EVENT (OUTPATIENT)
Dept: ENDOSCOPY | Facility: HOSPITAL | Age: 62
End: 2022-10-13
Payer: COMMERCIAL

## 2022-10-13 ENCOUNTER — ANESTHESIA (OUTPATIENT)
Dept: ENDOSCOPY | Facility: HOSPITAL | Age: 62
End: 2022-10-13
Payer: COMMERCIAL

## 2022-10-13 ENCOUNTER — HOSPITAL ENCOUNTER (OUTPATIENT)
Facility: HOSPITAL | Age: 62
Discharge: HOME | End: 2022-10-13
Attending: INTERNAL MEDICINE | Admitting: INTERNAL MEDICINE
Payer: COMMERCIAL

## 2022-10-13 VITALS
TEMPERATURE: 97 F | OXYGEN SATURATION: 95 % | SYSTOLIC BLOOD PRESSURE: 124 MMHG | BODY MASS INDEX: 28.93 KG/M2 | HEIGHT: 78 IN | WEIGHT: 250 LBS | RESPIRATION RATE: 15 BRPM | DIASTOLIC BLOOD PRESSURE: 73 MMHG | HEART RATE: 65 BPM

## 2022-10-13 DIAGNOSIS — Z12.11 SCREEN FOR COLON CANCER: ICD-10-CM

## 2022-10-13 PROCEDURE — 25000003 PHARM REV CODE 250: Performed by: INTERNAL MEDICINE

## 2022-10-13 PROCEDURE — 37000008 HC ANESTHESIA 1ST 15 MINUTES: Performed by: INTERNAL MEDICINE

## 2022-10-13 PROCEDURE — 45385 COLONOSCOPY W/LESION REMOVAL: CPT | Mod: PT | Performed by: INTERNAL MEDICINE

## 2022-10-13 PROCEDURE — 63600175 PHARM REV CODE 636 W HCPCS: Performed by: STUDENT IN AN ORGANIZED HEALTH CARE EDUCATION/TRAINING PROGRAM

## 2022-10-13 PROCEDURE — 45385 PR COLONOSCOPY,REMV LESN,SNARE: ICD-10-PCS | Mod: 33,,, | Performed by: INTERNAL MEDICINE

## 2022-10-13 PROCEDURE — 25000003 PHARM REV CODE 250: Performed by: STUDENT IN AN ORGANIZED HEALTH CARE EDUCATION/TRAINING PROGRAM

## 2022-10-13 PROCEDURE — 37000009 HC ANESTHESIA EA ADD 15 MINS: Performed by: INTERNAL MEDICINE

## 2022-10-13 PROCEDURE — 45385 COLONOSCOPY W/LESION REMOVAL: CPT | Mod: 33,,, | Performed by: INTERNAL MEDICINE

## 2022-10-13 PROCEDURE — 27201089 HC SNARE, DISP (ANY): Performed by: INTERNAL MEDICINE

## 2022-10-13 RX ORDER — SODIUM CHLORIDE 9 MG/ML
INJECTION, SOLUTION INTRAVENOUS CONTINUOUS
Status: DISCONTINUED | OUTPATIENT
Start: 2022-10-13 | End: 2022-10-13 | Stop reason: HOSPADM

## 2022-10-13 RX ORDER — PROPOFOL 10 MG/ML
INJECTION, EMULSION INTRAVENOUS
Status: DISCONTINUED | OUTPATIENT
Start: 2022-10-13 | End: 2022-10-13

## 2022-10-13 RX ORDER — SODIUM CHLORIDE 0.9 % (FLUSH) 0.9 %
10 SYRINGE (ML) INJECTION
Status: DISCONTINUED | OUTPATIENT
Start: 2022-10-13 | End: 2022-10-13 | Stop reason: HOSPADM

## 2022-10-13 RX ORDER — PROPOFOL 10 MG/ML
INJECTION, EMULSION INTRAVENOUS CONTINUOUS PRN
Status: DISCONTINUED | OUTPATIENT
Start: 2022-10-13 | End: 2022-10-13

## 2022-10-13 RX ORDER — LIDOCAINE HCL/PF 100 MG/5ML
SYRINGE (ML) INTRAVENOUS
Status: DISCONTINUED | OUTPATIENT
Start: 2022-10-13 | End: 2022-10-13

## 2022-10-13 RX ADMIN — LIDOCAINE HYDROCHLORIDE 50 MG: 20 INJECTION, SOLUTION INTRAVENOUS at 08:10

## 2022-10-13 RX ADMIN — SODIUM CHLORIDE: 0.9 INJECTION, SOLUTION INTRAVENOUS at 08:10

## 2022-10-13 RX ADMIN — PROPOFOL 100 MG: 10 INJECTION, EMULSION INTRAVENOUS at 08:10

## 2022-10-13 RX ADMIN — PROPOFOL 150 MCG/KG/MIN: 10 INJECTION, EMULSION INTRAVENOUS at 08:10

## 2022-10-13 NOTE — ANESTHESIA POSTPROCEDURE EVALUATION
Anesthesia Post Evaluation    Patient: Kevan Norris    Procedure(s) Performed: Procedure(s) (LRB):  COLONOSCOPY Golytely (N/A)    Final Anesthesia Type: MAC      Patient location during evaluation: GI PACU  Patient participation: Yes- Able to Participate  Level of consciousness: awake and alert  Post-procedure vital signs: reviewed and stable  Pain management: adequate  Airway patency: patent    PONV status at discharge: No PONV  Anesthetic complications: no      Cardiovascular status: hemodynamically stable, stable and blood pressure returned to baseline  Respiratory status: unassisted, spontaneous ventilation and room air  Hydration status: euvolemic  Follow-up not needed.          Vitals Value Taken Time   /85 10/13/22 0808   Temp 34.8 °C (94.7 °F) 10/13/22 0808   Pulse 75 10/13/22 0808   Resp 14 10/13/22 0808   SpO2 97 % 10/13/22 0808         No case tracking events are documented in the log.      Pain/Laurent Score: No data recorded

## 2022-10-13 NOTE — PROVATION PATIENT INSTRUCTIONS
Discharge Summary/Instructions after an Endoscopic Procedure  Patient Name: Kevan Norris  Patient MRN: [id number]  Patient YOB: 1960 Thursday, October 13, 2022  Sacha Mead MD  Dear patient,  As a result of recent federal legislation (The Federal Cures Act), you may   receive lab or pathology results from your procedure in your [facility]   account before your physician is able to contact you. Your physician or   their representative will relay the results to you with their   recommendations at their soonest availability.  Thank you,  Your health is very important to us during the Covid Crisis. Following your   procedure today, you will receive a daily text for 2 weeks asking about   signs or symptoms of Covid 19.  Please respond to this text when you   receive it so we can follow up and keep you as safe as possible.   RESTRICTIONS:  During your procedure today, you received medications for sedation.  These   medications may affect your judgment, balance and coordination.  Therefore,   for 24 hours, you have the following restrictions:   - DO NOT drive a car, operate machinery, make legal/financial decisions,   sign important papers or drink alcohol.    ACTIVITY:  Today: no heavy lifting, straining or running due to procedural   sedation/anesthesia.  The following day: return to full activity including work.  DIET:  Eat and drink normally unless instructed otherwise.     TREATMENT FOR COMMON SIDE EFFECTS:  - Mild abdominal pain, nausea, belching, bloating or excessive gas:  rest,   eat lightly and use a heating pad.  - Sore Throat: treat with throat lozenges and/or gargle with warm salt   water.  - Because air was used during the procedure, expelling large amounts of air   from your rectum or belching is normal.  - If a bowel prep was taken, you may not have a bowel movement for 1-3 days.    This is normal.  SYMPTOMS TO WATCH FOR AND REPORT TO YOUR PHYSICIAN:  1. Abdominal pain or bloating, other  than gas cramps.  2. Chest pain.  3. Back pain.  4. Signs of infection such as: chills or fever occurring within 24 hours   after the procedure.  5. Rectal bleeding, which would show as bright red, maroon, or black stools.   (A tablespoon of blood from the rectum is not serious, especially if   hemorrhoids are present.)  6. Vomiting.  7. Weakness or dizziness.  GO DIRECTLY TO THE NEAREST EMERGENCY ROOM IF YOU HAVE ANY OF THE FOLLOWING:      Difficulty breathing              Chills and/or fever over 101 F   Persistent vomiting and/or vomiting blood   Severe abdominal pain   Severe chest pain   Black, tarry stools   Bleeding- more than one tablespoon   Any other symptom or condition that you feel may need urgent attention  Your doctor recommends these additional instructions:  If any biopsies were taken, your doctors clinic will contact you in 1 to 2   weeks with any results.  - Discharge patient to home.   - Patient has a contact number available for emergencies.  The signs and   symptoms of potential delayed complications were discussed with the   patient.  Return to normal activities tomorrow.  Written discharge   instructions were provided to the patient.   - Resume previous diet.   - Continue present medications.   - Repeat colonoscopy in 7 years for surveillance.   - Use metamucil daily to help bowel habits.  For questions, problems or results please call your physician - Sacha Mead MD.  EMERGENCY PHONE NUMBER: [phone],  LAB RESULTS: [phone]  IF A COMPLICATION OR EMERGENCY SITUATION ARISES AND YOU ARE UNABLE TO REACH   YOUR PHYSICIAN - GO DIRECTLY TO THE EMERGENCY ROOM.  Sacha Mead MD  10/13/2022 8:56:54 AM  This report has been verified and signed electronically.  Dear patient,  As a result of recent federal legislation (The Federal Cures Act), you may   receive lab or pathology results from your procedure in your [facility]   account before your physician is able to contact you. Your  physician or   their representative will relay the results to you with their   recommendations at their soonest availability.  Thank you,  PROVATION

## 2022-10-13 NOTE — TRANSFER OF CARE
"Anesthesia Transfer of Care Note    Patient: Kevan Norris    Procedure(s) Performed: Procedure(s) (LRB):  COLONOSCOPY Golytely (N/A)    Patient location: GI    Anesthesia Type: MAC    Transport from OR: Transported from OR on room air with adequate spontaneous ventilation    Post pain: adequate analgesia    Post assessment: no apparent anesthetic complications    Post vital signs: stable    Level of consciousness: responds to stimulation    Nausea/Vomiting: no nausea/vomiting    Complications: none    Transfer of care protocol was followed      Last vitals:   Visit Vitals  /85   Pulse 75   Temp (!) 34.8 °C (94.7 °F)   Resp 14   Ht 6' 7" (2.007 m)   Wt 113.4 kg (250 lb)   SpO2 95%   BMI 28.16 kg/m²     "

## 2022-10-13 NOTE — H&P
Short Stay Endoscopy History and Physical    PCP - Jose A Ott, DO    Procedure - Colonoscopy  ASA - per anesthesia  Mallampati - per anesthesia  History of Anesthesia problems - no  Family history Anesthesia problems - no   Plan of anesthesia - General    HPI:  This is a 62 y.o. male here for evaluation of : asymptomatic screening exam  Hx of polyps      ROS:  Constitutional: No fevers, chills, No weight loss  CV: No chest pain  Pulm: No cough, No shortness of breath  GI: see HPI  Derm: No rash    Medical History:  has a past medical history of Blood clot in vein, Diabetes mellitus, Diabetes mellitus, type 2, Gout flare, Hyperlipemia, Prostate atrophy, and Varicose veins.    Surgical History:  has a past surgical history that includes Varicose vein surgery.    Family History: family history includes Diabetes in his father and sister.. Otherwise no colon cancer, inflammatory bowel disease, or GI malignancies.    Social History:  reports that he quit smoking about 14 years ago. He has never used smokeless tobacco. He reports current alcohol use. He reports that he does not use drugs.    Review of patient's allergies indicates:  No Known Allergies    Medications:   Medications Prior to Admission   Medication Sig Dispense Refill Last Dose    aspirin 81 mg Cap Take by mouth.   10/12/2022    cyanocobalamin (VITAMIN B-12) 1000 MCG tablet Take 2 tablets (2,000 mcg total) by mouth once daily. 90 tablet 5 Past Week    ergocalciferol (ERGOCALCIFEROL) 50,000 unit Cap TAKE 1 CAPSULE BY MOUTH EVERY 7 DAYS. THEN START DAILY OTC REPLACEMENT AFTER THIS RX IS COMPLETE 12 capsule 3 Past Week    losartan (COZAAR) 100 MG tablet Take 1 tablet (100 mg total) by mouth every evening. For blood pressure. 90 tablet 1 10/13/2022    rosuvastatin (CRESTOR) 5 MG tablet Take 1 tablet (5 mg total) by mouth once daily. For cholesterol 90 tablet 3 Past Week    fluorouraciL (EFUDEX) 5 % cream Apply thin film to left lower forearm 2times per  day for 3 weeks; d/c if area bleeding or ulcerated; avoid eyes or mouth (Patient not taking: Reported on 9/12/2022) 40 g 1     gentamicin (GARAMYCIN) 0.1 % ointment Apply topically.       ketoconazole (NIZORAL) 2 % cream AAA bid x 3 weeks 60 g 3          Physical Exam:    Vital Signs:   Vitals:    10/13/22 0808   BP: (!) 140/94   Pulse: 70   Resp: 18   Temp: (!) 94.7 °F (34.8 °C)       General Appearance: Well appearing in no acute distress  Eyes:    No scleral icterus  ENT: Neck supple, Lips, mucosa, and tongue normal; teeth and gums normal  Abdomen: Soft, non tender, non distended with positive bowel sounds. No hepatosplenomegaly, ascites, or mass.  Extremities: 2+ pulses, no clubbing, cyanosis or edema  Skin: No rash      Labs:  Lab Results   Component Value Date    WBC 7.80 06/07/2022    HGB 13.5 (L) 06/07/2022    HCT 43.6 06/07/2022     06/07/2022    CHOL 173 06/07/2022    TRIG 188 (H) 06/07/2022    HDL 39 (L) 06/07/2022    ALT 11 12/02/2021    AST 22 12/02/2021     01/07/2022    K 3.9 01/07/2022     01/07/2022    CREATININE 0.9 01/07/2022    BUN 21 01/07/2022    CO2 31 (H) 01/07/2022    TSH 1.372 12/02/2021    PSA 0.81 12/02/2021    INR 1.0 04/26/2013    HGBA1C 5.5 12/02/2021       I have explained the risks and benefits of endoscopy procedures to the patient including but not limited to bleeding, perforation, infection, and death.  The patient was asked if they understand and allowed to ask any further questions to their satisfaction.    Sacha Mead MD

## 2022-10-13 NOTE — ANESTHESIA PREPROCEDURE EVALUATION
10/13/2022  Kevan Norris is a 62 y.o., male for colonoscopy under MAC    Past Medical History:   Diagnosis Date    Blood clot in vein     Right Leg, 2009    Diabetes mellitus     Diabetes mellitus, type 2     Gout flare     Hyperlipemia     Prostate atrophy     Varicose veins      Past Surgical History:   Procedure Laterality Date    VARICOSE VEIN SURGERY             Pre-op Assessment    I have reviewed the Patient Summary Reports.    I have reviewed the NPO Status.   I have reviewed the Medications.     Review of Systems  Social:  Former Smoker        Physical Exam  General: Well nourished    Airway:  Mallampati: II           Anesthesia Plan  Type of Anesthesia, risks & benefits discussed:    Anesthesia Type: MAC  Intra-op Monitoring Plan: Standard ASA Monitors  Informed Consent: Informed consent signed with the Patient and all parties understand the risks and agree with anesthesia plan.  All questions answered.   ASA Score: 3    Ready For Surgery From Anesthesia Perspective.     .

## 2022-10-14 ENCOUNTER — TELEPHONE (OUTPATIENT)
Dept: ENDOSCOPY | Facility: HOSPITAL | Age: 62
End: 2022-10-14
Payer: COMMERCIAL

## 2022-12-13 ENCOUNTER — LAB VISIT (OUTPATIENT)
Dept: LAB | Facility: HOSPITAL | Age: 62
End: 2022-12-13
Attending: FAMILY MEDICINE
Payer: COMMERCIAL

## 2022-12-13 DIAGNOSIS — E78.49 OTHER HYPERLIPIDEMIA: ICD-10-CM

## 2022-12-13 DIAGNOSIS — D51.8 OTHER VITAMIN B12 DEFICIENCY ANEMIA: ICD-10-CM

## 2022-12-13 DIAGNOSIS — I10 ESSENTIAL HYPERTENSION: ICD-10-CM

## 2022-12-13 DIAGNOSIS — Z00.00 VISIT FOR WELL MAN HEALTH CHECK: ICD-10-CM

## 2022-12-13 DIAGNOSIS — E55.9 VITAMIN D DEFICIENCY: ICD-10-CM

## 2022-12-13 LAB
25(OH)D3+25(OH)D2 SERPL-MCNC: 17 NG/ML (ref 30–96)
ALBUMIN SERPL BCP-MCNC: 3.7 G/DL (ref 3.5–5.2)
ALP SERPL-CCNC: 52 U/L (ref 55–135)
ALT SERPL W/O P-5'-P-CCNC: 9 U/L (ref 10–44)
ANION GAP SERPL CALC-SCNC: 9 MMOL/L (ref 8–16)
AST SERPL-CCNC: 11 U/L (ref 10–40)
BASOPHILS # BLD AUTO: 0.06 K/UL (ref 0–0.2)
BASOPHILS NFR BLD: 1.2 % (ref 0–1.9)
BILIRUB SERPL-MCNC: 0.6 MG/DL (ref 0.1–1)
BUN SERPL-MCNC: 12 MG/DL (ref 8–23)
CALCIUM SERPL-MCNC: 9.1 MG/DL (ref 8.7–10.5)
CHLORIDE SERPL-SCNC: 104 MMOL/L (ref 95–110)
CHOLEST SERPL-MCNC: 141 MG/DL (ref 120–199)
CHOLEST/HDLC SERPL: 3.2 {RATIO} (ref 2–5)
CO2 SERPL-SCNC: 28 MMOL/L (ref 23–29)
COMPLEXED PSA SERPL-MCNC: 0.66 NG/ML (ref 0–4)
CREAT SERPL-MCNC: 0.8 MG/DL (ref 0.5–1.4)
CRP SERPL-MCNC: 5.4 MG/L (ref 0–8.2)
DIFFERENTIAL METHOD: ABNORMAL
EOSINOPHIL # BLD AUTO: 0.3 K/UL (ref 0–0.5)
EOSINOPHIL NFR BLD: 5.2 % (ref 0–8)
ERYTHROCYTE [DISTWIDTH] IN BLOOD BY AUTOMATED COUNT: 13.4 % (ref 11.5–14.5)
ERYTHROCYTE [SEDIMENTATION RATE] IN BLOOD BY PHOTOMETRIC METHOD: 9 MM/HR (ref 0–23)
EST. GFR  (NO RACE VARIABLE): >60 ML/MIN/1.73 M^2
ESTIMATED AVG GLUCOSE: 108 MG/DL (ref 68–131)
FOLATE SERPL-MCNC: 7 NG/ML (ref 4–24)
GLUCOSE SERPL-MCNC: 114 MG/DL (ref 70–110)
HBA1C MFR BLD: 5.4 % (ref 4–5.6)
HCT VFR BLD AUTO: 45 % (ref 40–54)
HDLC SERPL-MCNC: 44 MG/DL (ref 40–75)
HDLC SERPL: 31.2 % (ref 20–50)
HGB BLD-MCNC: 13.8 G/DL (ref 14–18)
IMM GRANULOCYTES # BLD AUTO: 0.01 K/UL (ref 0–0.04)
IMM GRANULOCYTES NFR BLD AUTO: 0.2 % (ref 0–0.5)
LDLC SERPL CALC-MCNC: 86.2 MG/DL (ref 63–159)
LYMPHOCYTES # BLD AUTO: 1.4 K/UL (ref 1–4.8)
LYMPHOCYTES NFR BLD: 28.5 % (ref 18–48)
MCH RBC QN AUTO: 28.3 PG (ref 27–31)
MCHC RBC AUTO-ENTMCNC: 30.7 G/DL (ref 32–36)
MCV RBC AUTO: 92 FL (ref 82–98)
MONOCYTES # BLD AUTO: 0.5 K/UL (ref 0.3–1)
MONOCYTES NFR BLD: 9.4 % (ref 4–15)
NEUTROPHILS # BLD AUTO: 2.8 K/UL (ref 1.8–7.7)
NEUTROPHILS NFR BLD: 55.5 % (ref 38–73)
NONHDLC SERPL-MCNC: 97 MG/DL
NRBC BLD-RTO: 0 /100 WBC
PLATELET # BLD AUTO: 227 K/UL (ref 150–450)
PMV BLD AUTO: 10.8 FL (ref 9.2–12.9)
POTASSIUM SERPL-SCNC: 4.3 MMOL/L (ref 3.5–5.1)
PROT SERPL-MCNC: 6.7 G/DL (ref 6–8.4)
RBC # BLD AUTO: 4.87 M/UL (ref 4.6–6.2)
SODIUM SERPL-SCNC: 141 MMOL/L (ref 136–145)
TRIGL SERPL-MCNC: 54 MG/DL (ref 30–150)
TSH SERPL DL<=0.005 MIU/L-ACNC: 1.21 UIU/ML (ref 0.4–4)
VIT B12 SERPL-MCNC: 252 PG/ML (ref 210–950)
WBC # BLD AUTO: 5.01 K/UL (ref 3.9–12.7)

## 2022-12-13 PROCEDURE — 84153 ASSAY OF PSA TOTAL: CPT | Performed by: FAMILY MEDICINE

## 2022-12-13 PROCEDURE — 36415 COLL VENOUS BLD VENIPUNCTURE: CPT | Mod: PO | Performed by: FAMILY MEDICINE

## 2022-12-13 PROCEDURE — 86140 C-REACTIVE PROTEIN: CPT | Performed by: FAMILY MEDICINE

## 2022-12-13 PROCEDURE — 85025 COMPLETE CBC W/AUTO DIFF WBC: CPT | Performed by: FAMILY MEDICINE

## 2022-12-13 PROCEDURE — 82746 ASSAY OF FOLIC ACID SERUM: CPT | Performed by: FAMILY MEDICINE

## 2022-12-13 PROCEDURE — 84443 ASSAY THYROID STIM HORMONE: CPT | Performed by: FAMILY MEDICINE

## 2022-12-13 PROCEDURE — 82607 VITAMIN B-12: CPT | Performed by: FAMILY MEDICINE

## 2022-12-13 PROCEDURE — 82306 VITAMIN D 25 HYDROXY: CPT | Performed by: FAMILY MEDICINE

## 2022-12-13 PROCEDURE — 85652 RBC SED RATE AUTOMATED: CPT | Performed by: FAMILY MEDICINE

## 2022-12-13 PROCEDURE — 80061 LIPID PANEL: CPT | Performed by: FAMILY MEDICINE

## 2022-12-13 PROCEDURE — 83036 HEMOGLOBIN GLYCOSYLATED A1C: CPT | Performed by: FAMILY MEDICINE

## 2022-12-13 PROCEDURE — 80053 COMPREHEN METABOLIC PANEL: CPT | Performed by: FAMILY MEDICINE

## 2022-12-15 ENCOUNTER — OFFICE VISIT (OUTPATIENT)
Dept: FAMILY MEDICINE | Facility: CLINIC | Age: 62
End: 2022-12-15
Payer: COMMERCIAL

## 2022-12-15 VITALS
TEMPERATURE: 98 F | HEART RATE: 80 BPM | OXYGEN SATURATION: 96 % | DIASTOLIC BLOOD PRESSURE: 84 MMHG | BODY MASS INDEX: 27.72 KG/M2 | SYSTOLIC BLOOD PRESSURE: 122 MMHG | WEIGHT: 239.63 LBS | HEIGHT: 78 IN

## 2022-12-15 DIAGNOSIS — E78.49 OTHER HYPERLIPIDEMIA: ICD-10-CM

## 2022-12-15 DIAGNOSIS — E55.9 VITAMIN D DEFICIENCY: ICD-10-CM

## 2022-12-15 DIAGNOSIS — I10 ESSENTIAL HYPERTENSION: ICD-10-CM

## 2022-12-15 DIAGNOSIS — K57.90 DIVERTICULOSIS: ICD-10-CM

## 2022-12-15 DIAGNOSIS — D51.8 OTHER VITAMIN B12 DEFICIENCY ANEMIA: ICD-10-CM

## 2022-12-15 DIAGNOSIS — Z00.00 VISIT FOR WELL MAN HEALTH CHECK: Primary | ICD-10-CM

## 2022-12-15 PROCEDURE — 3044F HG A1C LEVEL LT 7.0%: CPT | Mod: CPTII,S$GLB,, | Performed by: FAMILY MEDICINE

## 2022-12-15 PROCEDURE — 99999 PR PBB SHADOW E&M-EST. PATIENT-LVL IV: CPT | Mod: PBBFAC,,, | Performed by: FAMILY MEDICINE

## 2022-12-15 PROCEDURE — 4010F PR ACE/ARB THEARPY RXD/TAKEN: ICD-10-PCS | Mod: CPTII,S$GLB,, | Performed by: FAMILY MEDICINE

## 2022-12-15 PROCEDURE — 1159F MED LIST DOCD IN RCRD: CPT | Mod: CPTII,S$GLB,, | Performed by: FAMILY MEDICINE

## 2022-12-15 PROCEDURE — 1160F RVW MEDS BY RX/DR IN RCRD: CPT | Mod: CPTII,S$GLB,, | Performed by: FAMILY MEDICINE

## 2022-12-15 PROCEDURE — 3008F BODY MASS INDEX DOCD: CPT | Mod: CPTII,S$GLB,, | Performed by: FAMILY MEDICINE

## 2022-12-15 PROCEDURE — 99396 PR PREVENTIVE VISIT,EST,40-64: ICD-10-PCS | Mod: S$GLB,,, | Performed by: FAMILY MEDICINE

## 2022-12-15 PROCEDURE — 1160F PR REVIEW ALL MEDS BY PRESCRIBER/CLIN PHARMACIST DOCUMENTED: ICD-10-PCS | Mod: CPTII,S$GLB,, | Performed by: FAMILY MEDICINE

## 2022-12-15 PROCEDURE — 3079F DIAST BP 80-89 MM HG: CPT | Mod: CPTII,S$GLB,, | Performed by: FAMILY MEDICINE

## 2022-12-15 PROCEDURE — 4010F ACE/ARB THERAPY RXD/TAKEN: CPT | Mod: CPTII,S$GLB,, | Performed by: FAMILY MEDICINE

## 2022-12-15 PROCEDURE — 99999 PR PBB SHADOW E&M-EST. PATIENT-LVL IV: ICD-10-PCS | Mod: PBBFAC,,, | Performed by: FAMILY MEDICINE

## 2022-12-15 PROCEDURE — 1159F PR MEDICATION LIST DOCUMENTED IN MEDICAL RECORD: ICD-10-PCS | Mod: CPTII,S$GLB,, | Performed by: FAMILY MEDICINE

## 2022-12-15 PROCEDURE — 3008F PR BODY MASS INDEX (BMI) DOCUMENTED: ICD-10-PCS | Mod: CPTII,S$GLB,, | Performed by: FAMILY MEDICINE

## 2022-12-15 PROCEDURE — 3074F PR MOST RECENT SYSTOLIC BLOOD PRESSURE < 130 MM HG: ICD-10-PCS | Mod: CPTII,S$GLB,, | Performed by: FAMILY MEDICINE

## 2022-12-15 PROCEDURE — 3044F PR MOST RECENT HEMOGLOBIN A1C LEVEL <7.0%: ICD-10-PCS | Mod: CPTII,S$GLB,, | Performed by: FAMILY MEDICINE

## 2022-12-15 PROCEDURE — 99396 PREV VISIT EST AGE 40-64: CPT | Mod: S$GLB,,, | Performed by: FAMILY MEDICINE

## 2022-12-15 PROCEDURE — 3079F PR MOST RECENT DIASTOLIC BLOOD PRESSURE 80-89 MM HG: ICD-10-PCS | Mod: CPTII,S$GLB,, | Performed by: FAMILY MEDICINE

## 2022-12-15 PROCEDURE — 3074F SYST BP LT 130 MM HG: CPT | Mod: CPTII,S$GLB,, | Performed by: FAMILY MEDICINE

## 2022-12-15 RX ORDER — ROSUVASTATIN CALCIUM 5 MG/1
5 TABLET, COATED ORAL DAILY
Qty: 90 TABLET | Refills: 3 | Status: SHIPPED | OUTPATIENT
Start: 2022-12-15 | End: 2023-06-22 | Stop reason: SDUPTHER

## 2022-12-15 RX ORDER — ERGOCALCIFEROL 1.25 MG/1
CAPSULE ORAL
Qty: 12 CAPSULE | Refills: 3 | Status: SHIPPED | OUTPATIENT
Start: 2022-12-15 | End: 2023-06-22 | Stop reason: SDUPTHER

## 2022-12-15 RX ORDER — LOSARTAN POTASSIUM 100 MG/1
100 TABLET ORAL NIGHTLY
Qty: 90 TABLET | Refills: 1 | Status: SHIPPED | OUTPATIENT
Start: 2022-12-15 | End: 2023-06-22 | Stop reason: SDUPTHER

## 2022-12-15 RX ORDER — CHOLECALCIFEROL (VITAMIN D3) 25 MCG
5000 TABLET,CHEWABLE ORAL DAILY
Start: 2022-12-15 | End: 2023-06-22 | Stop reason: SDUPTHER

## 2022-12-15 NOTE — PATIENT INSTRUCTIONS
Instructions:   Mix together:  1/2 cup applesauce  2 tablespoons wheat bran (ycrs bran)  4-6 oz prune juice   Store in refrigerator.  Take a tablespoonful per day at first, gradually increasing until you find the amount that works best.      YOU CAN TITRATE THE FOLLOWING:  Metamucil: daily  Colace BID every day  Dulcolax PRN: 5 to 15 mg once daily        Please take b12 5000 mcg daily  Consider IM b12  Esr/crp normal  Will check for celiac at f/u     Continue losartan but at 100 mg for BP  Continue crestor for cholesterol    Continue OTC metamucil and/or prune juice  See AVS for full handout details     You have low vitamin D and a Rx has been sent to your pharmacy. Take this pill once a week and when the prescription and refills are done, start taking an OTC vitamin D supplementation at 1000 IU daily.      F/u 6 months, labs prior.

## 2022-12-15 NOTE — PROGRESS NOTES
"Subjective:       Patient ID: Kevan Norris is a 62 y.o. male.    Chief Complaint: Annual Exam      Kevan Norris is a 62 y.o. male who presents today for an annual exam    Diet: still trying to eat healthy. Salads nightly  Exercise: He's been running and walking. 4 times a week. "It makes me feel better."  Has lost weight.     Labs: ordered and reviewed.     Colon Cancer Screening: Last Colonoscopy completed on 10/13/2022     HTN: restarted losartan 12/2/2021. No light headedness. No dizziness. Has stopped taking for a bit, but has been trying to be more consistent recently. Took it last night. Mostly takes it at least 5-6 days/week.   DLD: on statin, started 12/2021. LDL and triglycerides at goal 12/2022.   Low vitamin D: on vitamin D   b12 def: improving but still low. Normal ESR/CRP. Still missing doses. Still trying to take it most days.   Venous stasis ulcer: was seeing wound care. Has been d/c  Hematuria: saw urology, had repeat cystoscopy 1/18/2022. Per that note, "If still microscopic hematuria present in 5 years can consider repeat workup (1/2027)"    PMHx: reviewed in EMR and updated  Meds: reviewed in EMR and updated  Shx: reviewed in EMR and updated  FMHx: no family history of colon cancer, breast cancer, ovarian cancer  Social: works in a restaurant. He lives alone. No children. Two sisters, two brothers live nearby. One sister lives in [place]. One dog at home.       Review of Systems   Constitutional:  Negative for fever.   Cardiovascular:  Negative for chest pain.   Gastrointestinal:  Positive for constipation. Negative for blood in stool, nausea and vomiting.   Neurological:  Negative for dizziness, light-headedness and headaches.       Health Maintenance Due   Topic Date Due    HIV Screening  Never done    TETANUS VACCINE  Never done    Shingles Vaccine (1 of 2) Never done    COVID-19 Vaccine (3 - Booster for Moderna series) 12/16/2021    Influenza Vaccine (1) 09/01/2022     Immunization " History   Administered Date(s) Administered    COVID-19, MRNA, LN-S, PF (MODERNA FULL 0.5 ML DOSE) 09/23/2021, 10/21/2021    Influenza - Quadrivalent - PF *Preferred* (6 months and older) 09/07/2017, 10/21/2021    Influenza - Trivalent (ADULT) 10/25/2010, 09/20/2011    Influenza - Trivalent - PF (ADULT) 09/08/2012, 09/30/2015    Pneumococcal Polysaccharide - 23 Valent 10/25/2010, 11/13/2021           Results for orders placed or performed in visit on 12/13/22   CBC Auto Differential   Result Value Ref Range    WBC 5.01 3.90 - 12.70 K/uL    RBC 4.87 4.60 - 6.20 M/uL    Hemoglobin 13.8 (L) 14.0 - 18.0 g/dL    Hematocrit 45.0 40.0 - 54.0 %    MCV 92 82 - 98 fL    MCH 28.3 27.0 - 31.0 pg    MCHC 30.7 (L) 32.0 - 36.0 g/dL    RDW 13.4 11.5 - 14.5 %    Platelets 227 150 - 450 K/uL    MPV 10.8 9.2 - 12.9 fL    Immature Granulocytes 0.2 0.0 - 0.5 %    Gran # (ANC) 2.8 1.8 - 7.7 K/uL    Immature Grans (Abs) 0.01 0.00 - 0.04 K/uL    Lymph # 1.4 1.0 - 4.8 K/uL    Mono # 0.5 0.3 - 1.0 K/uL    Eos # 0.3 0.0 - 0.5 K/uL    Baso # 0.06 0.00 - 0.20 K/uL    nRBC 0 0 /100 WBC    Gran % 55.5 38.0 - 73.0 %    Lymph % 28.5 18.0 - 48.0 %    Mono % 9.4 4.0 - 15.0 %    Eosinophil % 5.2 0.0 - 8.0 %    Basophil % 1.2 0.0 - 1.9 %    Differential Method Automated    Comprehensive Metabolic Panel   Result Value Ref Range    Sodium 141 136 - 145 mmol/L    Potassium 4.3 3.5 - 5.1 mmol/L    Chloride 104 95 - 110 mmol/L    CO2 28 23 - 29 mmol/L    Glucose 114 (H) 70 - 110 mg/dL    BUN 12 8 - 23 mg/dL    Creatinine 0.8 0.5 - 1.4 mg/dL    Calcium 9.1 8.7 - 10.5 mg/dL    Total Protein 6.7 6.0 - 8.4 g/dL    Albumin 3.7 3.5 - 5.2 g/dL    Total Bilirubin 0.6 0.1 - 1.0 mg/dL    Alkaline Phosphatase 52 (L) 55 - 135 U/L    AST 11 10 - 40 U/L    ALT 9 (L) 10 - 44 U/L    Anion Gap 9 8 - 16 mmol/L    eGFR >60.0 >60 mL/min/1.73 m^2   Hemoglobin A1C   Result Value Ref Range    Hemoglobin A1C 5.4 4.0 - 5.6 %    Estimated Avg Glucose 108 68 - 131 mg/dL   Lipid Panel    Result Value Ref Range    Cholesterol 141 120 - 199 mg/dL    Triglycerides 54 30 - 150 mg/dL    HDL 44 40 - 75 mg/dL    LDL Cholesterol 86.2 63.0 - 159.0 mg/dL    HDL/Cholesterol Ratio 31.2 20.0 - 50.0 %    Total Cholesterol/HDL Ratio 3.2 2.0 - 5.0    Non-HDL Cholesterol 97 mg/dL   TSH   Result Value Ref Range    TSH 1.213 0.400 - 4.000 uIU/mL   Vitamin D   Result Value Ref Range    Vit D, 25-Hydroxy 17 (L) 30 - 96 ng/mL   Vitamin B12   Result Value Ref Range    Vitamin B-12 252 210 - 950 pg/mL   FOLATE   Result Value Ref Range    Folate 7.0 4.0 - 24.0 ng/mL   Sedimentation rate   Result Value Ref Range    Sed Rate 9 0 - 23 mm/Hr   C-Reactive Protein   Result Value Ref Range    CRP 5.4 0.0 - 8.2 mg/L   PSA, Screening   Result Value Ref Range    PSA, Screen 0.66 0.00 - 4.00 ng/mL       Objective:     Vitals:    12/15/22 1013   BP: 122/84   Pulse: 80   Temp: 98 °F (36.7 °C)        Physical Exam  Constitutional:       General: He is not in acute distress.     Appearance: He is not ill-appearing, toxic-appearing or diaphoretic.   Cardiovascular:      Rate and Rhythm: Normal rate and regular rhythm.   Pulmonary:      Effort: Pulmonary effort is normal.      Breath sounds: Normal breath sounds.   Abdominal:      Palpations: Abdomen is soft.      Tenderness: There is no abdominal tenderness.   Musculoskeletal:         General: No swelling.      Comments: Wearing compression stockings BL   Neurological:      General: No focal deficit present.      Mental Status: He is alert.   Psychiatric:         Mood and Affect: Mood normal.         Behavior: Behavior normal.         Thought Content: Thought content normal.         Judgment: Judgment normal.       Assessment:       1. Visit for well man health check    2. Other hyperlipidemia    3. Essential hypertension    4. Vitamin D deficiency    5. Other vitamin B12 deficiency anemia    6. Diverticulosis in the sigmoid colon, in the desending colon, and the transverse colon    7.  BMI 27.0-27.9,adult        Plan:         ?Avoid tobacco  ?Be physically active  ?Maintain a healthy weight  ?Eat a diet rich in fruits, vegetables, and whole grains, and low in saturated/trans fat  ?Limit alcohol consumption  ?Protect against sexually transmitted infections  ?Avoid excess sun  RTC as directed during the visit, as needed or in 1 year for a physical with labs prior. Call one month prior to the physical to schedule apt and labs.     Please take b12 5000 mcg daily  Consider IM b12, patient would like to hold off today.   Esr/crp normal  Will check for celiac at f/u     Continue losartan but at 100 mg for BP  Continue crestor for cholesterol    Continue OTC metamucil and/or prune juice  See AVS for full handout details  Consider linzess??     You have low vitamin D and a Rx has been sent to your pharmacy. Take this pill once a week and when the prescription and refills are done, start taking an OTC vitamin D supplementation at 1000 IU daily.      F/u 6 months, labs prior.       Visit for well man health check    Other hyperlipidemia  -     rosuvastatin (CRESTOR) 5 MG tablet; Take 1 tablet (5 mg total) by mouth once daily. For cholesterol  Dispense: 90 tablet; Refill: 3    Essential hypertension  -     losartan (COZAAR) 100 MG tablet; Take 1 tablet (100 mg total) by mouth every evening. For blood pressure.  Dispense: 90 tablet; Refill: 1    Vitamin D deficiency  -     ergocalciferol (ERGOCALCIFEROL) 50,000 unit Cap; TAKE 1 CAPSULE BY MOUTH EVERY 7 DAYS. THEN START DAILY OTC REPLACEMENT AFTER THIS RX IS COMPLETE  Dispense: 12 capsule; Refill: 3    Other vitamin B12 deficiency anemia  -     cyanocobalamin 2,500 mcg Tab; Take 5,000 mcg by mouth once daily.  -     Vitamin B12; Future; Expected date: 12/15/2022  -     Tissue transglutaminase, IgA; Future; Expected date: 12/15/2022  -     IgA; Future; Expected date: 12/15/2022    Diverticulosis in the sigmoid colon, in the desending colon, and the transverse  colon    BMI 27.0-27.9,adult

## 2023-06-15 ENCOUNTER — LAB VISIT (OUTPATIENT)
Dept: LAB | Facility: HOSPITAL | Age: 63
End: 2023-06-15
Attending: FAMILY MEDICINE
Payer: COMMERCIAL

## 2023-06-15 DIAGNOSIS — D51.8 OTHER VITAMIN B12 DEFICIENCY ANEMIA: ICD-10-CM

## 2023-06-15 LAB
IGA SERPL-MCNC: 239 MG/DL (ref 40–350)
VIT B12 SERPL-MCNC: 283 PG/ML (ref 210–950)

## 2023-06-15 PROCEDURE — 82607 VITAMIN B-12: CPT | Performed by: FAMILY MEDICINE

## 2023-06-15 PROCEDURE — 86364 TISS TRNSGLTMNASE EA IG CLAS: CPT | Performed by: FAMILY MEDICINE

## 2023-06-15 PROCEDURE — 36415 COLL VENOUS BLD VENIPUNCTURE: CPT | Mod: PO | Performed by: FAMILY MEDICINE

## 2023-06-15 PROCEDURE — 82784 ASSAY IGA/IGD/IGG/IGM EACH: CPT | Performed by: FAMILY MEDICINE

## 2023-06-20 LAB — TTG IGA SER-ACNC: 0.7 U/ML

## 2023-06-22 ENCOUNTER — OFFICE VISIT (OUTPATIENT)
Dept: FAMILY MEDICINE | Facility: CLINIC | Age: 63
End: 2023-06-22
Payer: COMMERCIAL

## 2023-06-22 VITALS
OXYGEN SATURATION: 97 % | WEIGHT: 242.94 LBS | HEIGHT: 78 IN | DIASTOLIC BLOOD PRESSURE: 88 MMHG | SYSTOLIC BLOOD PRESSURE: 139 MMHG | BODY MASS INDEX: 28.11 KG/M2 | HEART RATE: 97 BPM

## 2023-06-22 DIAGNOSIS — K59.09 OTHER CONSTIPATION: ICD-10-CM

## 2023-06-22 DIAGNOSIS — R14.0 BLOATING: ICD-10-CM

## 2023-06-22 DIAGNOSIS — Z00.00 VISIT FOR WELL MAN HEALTH CHECK: ICD-10-CM

## 2023-06-22 DIAGNOSIS — Z12.5 SCREENING PSA (PROSTATE SPECIFIC ANTIGEN): ICD-10-CM

## 2023-06-22 DIAGNOSIS — I10 ESSENTIAL HYPERTENSION: Primary | ICD-10-CM

## 2023-06-22 DIAGNOSIS — K76.9 LIVER DISEASE, UNSPECIFIED: ICD-10-CM

## 2023-06-22 DIAGNOSIS — L98.9 SKIN LESION: ICD-10-CM

## 2023-06-22 DIAGNOSIS — K76.9 LIVER LESION: ICD-10-CM

## 2023-06-22 DIAGNOSIS — R11.2 NAUSEA AND VOMITING, UNSPECIFIED VOMITING TYPE: ICD-10-CM

## 2023-06-22 DIAGNOSIS — E78.49 OTHER HYPERLIPIDEMIA: ICD-10-CM

## 2023-06-22 DIAGNOSIS — E55.9 VITAMIN D DEFICIENCY: ICD-10-CM

## 2023-06-22 DIAGNOSIS — D51.8 OTHER VITAMIN B12 DEFICIENCY ANEMIA: ICD-10-CM

## 2023-06-22 DIAGNOSIS — B35.3 TINEA PEDIS OF RIGHT FOOT: ICD-10-CM

## 2023-06-22 PROCEDURE — 4010F ACE/ARB THERAPY RXD/TAKEN: CPT | Mod: CPTII,S$GLB,, | Performed by: FAMILY MEDICINE

## 2023-06-22 PROCEDURE — 3008F BODY MASS INDEX DOCD: CPT | Mod: CPTII,S$GLB,, | Performed by: FAMILY MEDICINE

## 2023-06-22 PROCEDURE — 3079F DIAST BP 80-89 MM HG: CPT | Mod: CPTII,S$GLB,, | Performed by: FAMILY MEDICINE

## 2023-06-22 PROCEDURE — 99214 PR OFFICE/OUTPT VISIT, EST, LEVL IV, 30-39 MIN: ICD-10-PCS | Mod: S$GLB,,, | Performed by: FAMILY MEDICINE

## 2023-06-22 PROCEDURE — 99999 PR PBB SHADOW E&M-EST. PATIENT-LVL V: ICD-10-PCS | Mod: PBBFAC,,, | Performed by: FAMILY MEDICINE

## 2023-06-22 PROCEDURE — 1160F PR REVIEW ALL MEDS BY PRESCRIBER/CLIN PHARMACIST DOCUMENTED: ICD-10-PCS | Mod: CPTII,S$GLB,, | Performed by: FAMILY MEDICINE

## 2023-06-22 PROCEDURE — 3008F PR BODY MASS INDEX (BMI) DOCUMENTED: ICD-10-PCS | Mod: CPTII,S$GLB,, | Performed by: FAMILY MEDICINE

## 2023-06-22 PROCEDURE — 4010F PR ACE/ARB THEARPY RXD/TAKEN: ICD-10-PCS | Mod: CPTII,S$GLB,, | Performed by: FAMILY MEDICINE

## 2023-06-22 PROCEDURE — 3079F PR MOST RECENT DIASTOLIC BLOOD PRESSURE 80-89 MM HG: ICD-10-PCS | Mod: CPTII,S$GLB,, | Performed by: FAMILY MEDICINE

## 2023-06-22 PROCEDURE — 1159F PR MEDICATION LIST DOCUMENTED IN MEDICAL RECORD: ICD-10-PCS | Mod: CPTII,S$GLB,, | Performed by: FAMILY MEDICINE

## 2023-06-22 PROCEDURE — 3075F SYST BP GE 130 - 139MM HG: CPT | Mod: CPTII,S$GLB,, | Performed by: FAMILY MEDICINE

## 2023-06-22 PROCEDURE — 99999 PR PBB SHADOW E&M-EST. PATIENT-LVL V: CPT | Mod: PBBFAC,,, | Performed by: FAMILY MEDICINE

## 2023-06-22 PROCEDURE — 1160F RVW MEDS BY RX/DR IN RCRD: CPT | Mod: CPTII,S$GLB,, | Performed by: FAMILY MEDICINE

## 2023-06-22 PROCEDURE — 99214 OFFICE O/P EST MOD 30 MIN: CPT | Mod: S$GLB,,, | Performed by: FAMILY MEDICINE

## 2023-06-22 PROCEDURE — 1159F MED LIST DOCD IN RCRD: CPT | Mod: CPTII,S$GLB,, | Performed by: FAMILY MEDICINE

## 2023-06-22 PROCEDURE — 3075F PR MOST RECENT SYSTOLIC BLOOD PRESS GE 130-139MM HG: ICD-10-PCS | Mod: CPTII,S$GLB,, | Performed by: FAMILY MEDICINE

## 2023-06-22 RX ORDER — LOSARTAN POTASSIUM 100 MG/1
100 TABLET ORAL NIGHTLY
Qty: 90 TABLET | Refills: 1 | Status: SHIPPED | OUTPATIENT
Start: 2023-06-22 | End: 2024-06-21

## 2023-06-22 RX ORDER — KETOCONAZOLE 20 MG/G
CREAM TOPICAL
Qty: 60 G | Refills: 3 | Status: SHIPPED | OUTPATIENT
Start: 2023-06-22

## 2023-06-22 RX ORDER — ROSUVASTATIN CALCIUM 5 MG/1
5 TABLET, COATED ORAL DAILY
Qty: 90 TABLET | Refills: 3 | Status: SHIPPED | OUTPATIENT
Start: 2023-06-22

## 2023-06-22 RX ORDER — ERGOCALCIFEROL 1.25 MG/1
CAPSULE ORAL
Qty: 12 CAPSULE | Refills: 3 | Status: SHIPPED | OUTPATIENT
Start: 2023-06-22

## 2023-06-22 RX ORDER — TRIAMCINOLONE ACETONIDE 1 MG/G
CREAM TOPICAL 2 TIMES DAILY
Qty: 45 G | Refills: 0 | Status: SHIPPED | OUTPATIENT
Start: 2023-06-22

## 2023-06-22 RX ORDER — CHOLECALCIFEROL (VITAMIN D3) 25 MCG
5000 TABLET,CHEWABLE ORAL DAILY
Start: 2023-06-22

## 2023-06-22 NOTE — PROGRESS NOTES
"Subjective:       Patient ID: Kevan Norris is a 62 y.o. male.    Chief Complaint: Hypertension    Kevan is a 62 y.o. male who presents today for f/u    He is having GI issues so he stopped taking ALL his medications however symptoms persisted. On questioning, symptoms have been ongoing for years but are worse now.     HTN: restarted losartan 12/2/2021. Now, not taking again, BP higher then goal.   DLD: on statin. Stopped taking it.   Low vitamin D: stopped on vitamin D     Venous stasis ulcer: was seeing wound care. Has been d/c. No active wounds noted on BL LE today. Does have varicose veings.   Hematuria: saw urology, had repeat cystoscopy 1/18/2022. Per that note, "If still microscopic hematuria present in 5 years can consider repeat workup (1/2027)"    b12 def: improving but still low. Normal ESR/CRP. Negative celiac panel. Not taking b12.     Describes symptoms that have been ongoing for 2 years. C-scope was normal. He reports that he is constipated. He states that he tries to go twice daily. Prune juice helped, but not anymore. Metamucil didn't help either. Mostly constipation and bloating. Does have nausea and occasional vomiting due to this. Does have a history of stomach ulcers. Also reports history of liver lesion, previously followed by Dr. Padron. Diagnosed 10 years ago.     Has a rash for a year. On his right leg.     Review of Systems   Constitutional:  Negative for chills and fever.   Gastrointestinal:  Positive for abdominal pain, constipation, nausea and vomiting.   Skin:  Positive for rash.           Results for orders placed or performed in visit on 06/15/23   Vitamin B12   Result Value Ref Range    Vitamin B-12 283 210 - 950 pg/mL   Tissue transglutaminase, IgA   Result Value Ref Range    TTG IgA 0.7 <7.0 U/mL   IgA   Result Value Ref Range    IgA 239 40 - 350 mg/dL       Objective:     Vitals:    06/22/23 0937   BP: 139/88   BP Location: Right arm   Patient Position: Sitting   BP " "Method: Large (Manual)   Pulse: 97   SpO2: 97%   Weight: 110.2 kg (242 lb 15.2 oz)   Height: 6' 7" (2.007 m)        Physical Exam  Constitutional:       General: He is not in acute distress.     Appearance: He is not ill-appearing, toxic-appearing or diaphoretic.   Cardiovascular:      Rate and Rhythm: Normal rate and regular rhythm.   Pulmonary:      Effort: Pulmonary effort is normal.      Breath sounds: Normal breath sounds.   Abdominal:      General: There is no distension.      Palpations: Abdomen is soft.      Tenderness: There is no abdominal tenderness.   Musculoskeletal:      Comments: Able to examine legs today  Varicose veins noted BL  Changes consistent with darkening of skin from chronic venous insufficiency noted BL  No wounds noted    Rash on RLE, see picture below.    Neurological:      Mental Status: He is alert.   Psychiatric:         Mood and Affect: Mood normal.         Behavior: Behavior normal.         Thought Content: Thought content normal.         Judgment: Judgment normal.             Assessment:       1. Essential hypertension    2. Other hyperlipidemia    3. Other vitamin B12 deficiency anemia    4. Vitamin D deficiency    5. Bloating    6. Other constipation    7. Nausea and vomiting, unspecified vomiting type    8. Liver disease, unspecified    9. Liver lesion    10. Tinea pedis of right foot    11. Skin lesion    12. Screening PSA (prostate specific antigen)    13. Visit for well man health check        Plan:       Restart all meds  You have low vitamin D and a Rx has been sent to your pharmacy. Take this pill once a week and when the prescription and refills are done, start taking an OTC vitamin D supplementation at 1000 IU daily.   You have low B12. Goal >400. Start daily OTC B12 supplementation at 2500 mcg     Take losartan  Take crestor    Regarding GI issues: ct scan, stool studies, start linzess. Take for 3 weeks. Can double at that time if needed, update me in 6 weeks. GI " referral.   Try PPI, declined today. Was on pantoprazole in the past, reports symptoms didn't improve at that time.     Try cream for rash. Related to VI? Ongoing for >1 year. Refer to derm. Biopsy if persisting?    F/u 6 months. Labs prior. Annual.     Essential hypertension  -     losartan (COZAAR) 100 MG tablet; Take 1 tablet (100 mg total) by mouth every evening. For blood pressure.  Dispense: 90 tablet; Refill: 1  -     CBC Auto Differential; Future; Expected date: 06/22/2023  -     Comprehensive Metabolic Panel; Future; Expected date: 06/22/2023  -     TSH; Future; Expected date: 06/22/2023    Other hyperlipidemia  -     rosuvastatin (CRESTOR) 5 MG tablet; Take 1 tablet (5 mg total) by mouth once daily. For cholesterol  Dispense: 90 tablet; Refill: 3  -     Lipid Panel; Future; Expected date: 06/22/2023    Other vitamin B12 deficiency anemia  -     cyanocobalamin, vitamin B-12, 2,500 mcg Tab; Take 5,000 mcg by mouth once daily.  -     Vitamin B12; Future; Expected date: 06/22/2023  -     FOLATE; Future; Expected date: 06/22/2023  -     VITAMIN B1; Future; Expected date: 06/22/2023    Vitamin D deficiency  -     ergocalciferol (ERGOCALCIFEROL) 50,000 unit Cap; TAKE 1 CAPSULE BY MOUTH EVERY 7 DAYS. THEN START DAILY OTC REPLACEMENT AFTER THIS RX IS COMPLETE  Dispense: 12 capsule; Refill: 3  -     Vitamin D; Future; Expected date: 06/22/2023    Bloating  -     Stool Exam-Ova,Cysts,Parasites; Future; Expected date: 06/22/2023  -     Stool culture; Future; Expected date: 06/22/2023  -     Lactoferrin, fecal, quantitative; Future; Expected date: 06/22/2023  -     Giardia / Cryptosporidum, EIA; Future; Expected date: 06/22/2023  -     Calprotectin, Stool; Future; Expected date: 06/22/2023  -     H. pylori antigen, stool; Future; Expected date: 06/22/2023  -     Pancreatic elastase, fecal; Future; Expected date: 06/22/2023  -     Ambulatory referral/consult to Gastroenterology; Future; Expected date:  06/29/2023    Other constipation  -     Stool Exam-Ova,Cysts,Parasites; Future; Expected date: 06/22/2023  -     Stool culture; Future; Expected date: 06/22/2023  -     Lactoferrin, fecal, quantitative; Future; Expected date: 06/22/2023  -     Giardia / Cryptosporidum, EIA; Future; Expected date: 06/22/2023  -     Calprotectin, Stool; Future; Expected date: 06/22/2023  -     H. pylori antigen, stool; Future; Expected date: 06/22/2023  -     Pancreatic elastase, fecal; Future; Expected date: 06/22/2023  -     linaCLOtide (LINZESS) 72 mcg Cap capsule; Take 1 capsule (72 mcg total) by mouth before breakfast.  Dispense: 90 capsule; Refill: 2  -     CT Abdomen Without Contrast; Future; Expected date: 06/22/2023  -     Ambulatory referral/consult to Gastroenterology; Future; Expected date: 06/29/2023    Nausea and vomiting, unspecified vomiting type  -     Stool Exam-Ova,Cysts,Parasites; Future; Expected date: 06/22/2023  -     Stool culture; Future; Expected date: 06/22/2023  -     Lactoferrin, fecal, quantitative; Future; Expected date: 06/22/2023  -     Giardia / Cryptosporidum, EIA; Future; Expected date: 06/22/2023  -     Calprotectin, Stool; Future; Expected date: 06/22/2023  -     H. pylori antigen, stool; Future; Expected date: 06/22/2023  -     Pancreatic elastase, fecal; Future; Expected date: 06/22/2023  -     Cancel: CT Abdomen Without Contrast; Future; Expected date: 06/22/2023  -     CT Abdomen Without Contrast; Future; Expected date: 06/22/2023  -     Ambulatory referral/consult to Gastroenterology; Future; Expected date: 06/29/2023  -     CBC Auto Differential; Future; Expected date: 06/22/2023  -     Comprehensive Metabolic Panel; Future; Expected date: 06/22/2023    Liver disease, unspecified  -     CT Abdomen Without Contrast; Future; Expected date: 06/22/2023    Liver lesion  -     CT Abdomen Without Contrast; Future; Expected date: 06/22/2023    Tinea pedis of right foot  -     ketoconazole (NIZORAL)  2 % cream; AAA bid x 3 weeks  Dispense: 60 g; Refill: 3    Skin lesion  -     triamcinolone acetonide 0.1% (KENALOG) 0.1 % cream; Apply topically 2 (two) times daily. X 14 days  Dispense: 45 g; Refill: 0  -     Ambulatory referral/consult to Dermatology; Future; Expected date: 06/29/2023    Screening PSA (prostate specific antigen)  -     PSA, Screening; Future; Expected date: 06/22/2023    Visit for well man health check  -     CBC Auto Differential; Future; Expected date: 06/22/2023  -     Comprehensive Metabolic Panel; Future; Expected date: 06/22/2023  -     Hemoglobin A1C; Future; Expected date: 06/22/2023  -     Lipid Panel; Future; Expected date: 06/22/2023  -     TSH; Future; Expected date: 06/22/2023  -     PSA, Screening; Future; Expected date: 06/22/2023  -     Vitamin D; Future; Expected date: 06/22/2023  -     Vitamin B12; Future; Expected date: 06/22/2023  -     FOLATE; Future; Expected date: 06/22/2023  -     VITAMIN B1; Future; Expected date: 06/22/2023            Warning signs discussed, patient to call with any further issues or worsening of symptoms. Above note may have utilized dictation, please excuse any transcription errors.

## 2023-06-22 NOTE — PATIENT INSTRUCTIONS
Restart all meds  You have low vitamin D and a Rx has been sent to your pharmacy. Take this pill once a week and when the prescription and refills are done, start taking an OTC vitamin D supplementation at 1000 IU daily.   You have low B12. Goal >400. Start daily OTC B12 supplementation at 2500 mcg     Take losartan  Take crestor    Regarding GI issues: ct scan, stool studies, start linzess. Take for 3 weeks. Can double at that time if needed, update me in 6 weeks. GI referral.   Try PPI, declined today. Was on pantoprazole in the past, reports symptoms didn't improve at that time.

## 2023-06-27 ENCOUNTER — TELEPHONE (OUTPATIENT)
Dept: DERMATOLOGY | Facility: CLINIC | Age: 63
End: 2023-06-27
Payer: COMMERCIAL

## 2023-06-27 NOTE — TELEPHONE ENCOUNTER
"Unable to LVM pt's VM not set up.     ----- Message from Roberto Carter MA sent at 6/22/2023  4:50 PM CDT -----    ----- Message -----  From: Patricia Tapia  Sent: 6/22/2023   4:48 PM CDT  To: [facility] Staff    Good afternoon  Patient with a Referral to Dermatology from Dr Jose A Ott.  Diagnosis "Skin lesion "  Patient ep with Dr Nicole  Can you please assist scheduling patient  Thanks    Patricia          "

## 2023-07-17 ENCOUNTER — HOSPITAL ENCOUNTER (OUTPATIENT)
Dept: RADIOLOGY | Facility: HOSPITAL | Age: 63
Discharge: HOME | End: 2023-07-17
Attending: FAMILY MEDICINE
Payer: COMMERCIAL

## 2023-07-17 ENCOUNTER — TELEPHONE (OUTPATIENT)
Dept: FAMILY MEDICINE | Facility: CLINIC | Age: 63
End: 2023-07-17
Payer: COMMERCIAL

## 2023-07-17 DIAGNOSIS — K59.09 OTHER CONSTIPATION: ICD-10-CM

## 2023-07-17 DIAGNOSIS — R11.2 NAUSEA AND VOMITING, UNSPECIFIED VOMITING TYPE: ICD-10-CM

## 2023-07-17 DIAGNOSIS — K76.9 LIVER DISEASE, UNSPECIFIED: ICD-10-CM

## 2023-07-17 DIAGNOSIS — K76.9 LIVER LESION: ICD-10-CM

## 2023-07-17 DIAGNOSIS — K57.92 DIVERTICULITIS: Primary | ICD-10-CM

## 2023-07-17 PROCEDURE — 25500020 PHARM REV CODE 255: Performed by: FAMILY MEDICINE

## 2023-07-17 PROCEDURE — 74150 CT ABDOMEN W/O CONTRAST: CPT | Mod: TC

## 2023-07-17 PROCEDURE — A9698 NON-RAD CONTRAST MATERIALNOC: HCPCS | Performed by: FAMILY MEDICINE

## 2023-07-17 PROCEDURE — 74150 CT ABDOMEN W/O CONTRAST: CPT | Mod: 26,,, | Performed by: RADIOLOGY

## 2023-07-17 PROCEDURE — 74150 CT ABDOMEN WITHOUT CONTRAST: ICD-10-PCS | Mod: 26,,, | Performed by: RADIOLOGY

## 2023-07-17 RX ORDER — METRONIDAZOLE 500 MG/1
500 TABLET ORAL 3 TIMES DAILY
Qty: 30 TABLET | Refills: 0 | Status: SHIPPED | OUTPATIENT
Start: 2023-07-17 | End: 2023-07-27

## 2023-07-17 RX ORDER — CIPROFLOXACIN 500 MG/1
500 TABLET ORAL 2 TIMES DAILY
Qty: 20 TABLET | Refills: 0 | Status: SHIPPED | OUTPATIENT
Start: 2023-07-17 | End: 2023-07-27

## 2023-07-17 RX ADMIN — IOHEXOL 500 ML: 12 SOLUTION ORAL at 08:07

## 2023-07-17 NOTE — TELEPHONE ENCOUNTER
Please call patient  He has diverticulitis of his colon  This is inflammation and an infection  I have sent him antibiotics  He should follow a clear liquid diet (jello, chicken broth, clear juices, etc) x 72 hours  Advance diet as tolerated    If symptoms persist or worsen, go to the ER    Keep apt with GI on 7/25/2023    Please message back confirming patient has received this message    Liver appears normal.   Gallbladder is normal    There is a small fat containing belly button hernia

## 2023-07-25 ENCOUNTER — OFFICE VISIT (OUTPATIENT)
Dept: GASTROENTEROLOGY | Facility: CLINIC | Age: 63
End: 2023-07-25
Payer: COMMERCIAL

## 2023-07-25 VITALS — BODY MASS INDEX: 27.42 KG/M2 | WEIGHT: 237 LBS | HEIGHT: 78 IN

## 2023-07-25 DIAGNOSIS — R11.2 NAUSEA AND VOMITING, UNSPECIFIED VOMITING TYPE: ICD-10-CM

## 2023-07-25 DIAGNOSIS — R14.0 BLOATING: ICD-10-CM

## 2023-07-25 DIAGNOSIS — K58.1 IRRITABLE BOWEL SYNDROME WITH CONSTIPATION: Primary | ICD-10-CM

## 2023-07-25 DIAGNOSIS — K59.09 OTHER CONSTIPATION: ICD-10-CM

## 2023-07-25 PROCEDURE — 3008F PR BODY MASS INDEX (BMI) DOCUMENTED: ICD-10-PCS | Mod: CPTII,S$GLB,, | Performed by: INTERNAL MEDICINE

## 2023-07-25 PROCEDURE — 99214 OFFICE O/P EST MOD 30 MIN: CPT | Mod: S$GLB,,, | Performed by: INTERNAL MEDICINE

## 2023-07-25 PROCEDURE — 99999 PR PBB SHADOW E&M-EST. PATIENT-LVL III: CPT | Mod: PBBFAC,,, | Performed by: INTERNAL MEDICINE

## 2023-07-25 PROCEDURE — 99999 PR PBB SHADOW E&M-EST. PATIENT-LVL III: ICD-10-PCS | Mod: PBBFAC,,, | Performed by: INTERNAL MEDICINE

## 2023-07-25 PROCEDURE — 4010F PR ACE/ARB THEARPY RXD/TAKEN: ICD-10-PCS | Mod: CPTII,S$GLB,, | Performed by: INTERNAL MEDICINE

## 2023-07-25 PROCEDURE — 1159F MED LIST DOCD IN RCRD: CPT | Mod: CPTII,S$GLB,, | Performed by: INTERNAL MEDICINE

## 2023-07-25 PROCEDURE — 4010F ACE/ARB THERAPY RXD/TAKEN: CPT | Mod: CPTII,S$GLB,, | Performed by: INTERNAL MEDICINE

## 2023-07-25 PROCEDURE — 99214 PR OFFICE/OUTPT VISIT, EST, LEVL IV, 30-39 MIN: ICD-10-PCS | Mod: S$GLB,,, | Performed by: INTERNAL MEDICINE

## 2023-07-25 PROCEDURE — 1159F PR MEDICATION LIST DOCUMENTED IN MEDICAL RECORD: ICD-10-PCS | Mod: CPTII,S$GLB,, | Performed by: INTERNAL MEDICINE

## 2023-07-25 PROCEDURE — 3008F BODY MASS INDEX DOCD: CPT | Mod: CPTII,S$GLB,, | Performed by: INTERNAL MEDICINE

## 2023-07-25 RX ORDER — LUBIPROSTONE 8 UG/1
8 CAPSULE ORAL 2 TIMES DAILY
Qty: 60 CAPSULE | Refills: 5 | Status: SHIPPED | OUTPATIENT
Start: 2023-07-25

## 2023-07-25 NOTE — PROGRESS NOTES
Subjective:       Patient ID: Kevan Norris is a 62 y.o. male.    Chief Complaint: Nausea, Emesis, Constipation, and Bloated    Patient here today to formally establish care with aforementioned complaints.  He was previously seen by Dr. Mead in 2022 for screening colonoscopy.  Exam was significant for a 5 mm polyp removed from the transverse colon in addition to diverticulosis left colon.  A 7 year recall interval was recommended.  Today patient reports significant constipation which is been troubling him for years.  Following diagnosis of diverticulosis at time of recent colonoscopy he was instructed to start Metamucil.  He did not feel like that helped much.  Now he drinks prune juice daily only to have 1 small on fulfilling bowel movement with significant straining.  He was prescribed Linzess by his PCP however was not well covered.  In evaluation of his constipation he was sent for CT which was significant for changes suggestive of acute diverticulitis.  He is currently on antibiotics.    Review of Systems   Gastrointestinal:  Positive for abdominal distention, abdominal pain and constipation.       The following portions of the patient's history were reviewed and updated as appropriate: allergies, current medications, past family history, past medical history, past social history, past surgical history and problem list.    Objective:      Physical Exam  Constitutional:       Appearance: He is well-developed.   HENT:      Head: Normocephalic and atraumatic.   Eyes:      Conjunctiva/sclera: Conjunctivae normal.   Pulmonary:      Effort: Pulmonary effort is normal. No respiratory distress.   Neurological:      Mental Status: He is alert and oriented to person, place, and time.   Psychiatric:         Behavior: Behavior normal.         Thought Content: Thought content normal.         Judgment: Judgment normal.         Pertinent labs and imaging studies reviewed    Assessment:       1. Irritable bowel syndrome  with constipation    2. Bloating    3. Other constipation    4. Nausea and vomiting, unspecified vomiting type        Plan:       Will prescribe Amitiza as it appears to be better covered     (Portions of this note were dictated using voice recognition software and may contain dictation related errors in spelling/grammar/syntax not found on text review)

## 2023-08-15 ENCOUNTER — LAB VISIT (OUTPATIENT)
Dept: LAB | Facility: HOSPITAL | Age: 63
End: 2023-08-15
Attending: FAMILY MEDICINE
Payer: COMMERCIAL

## 2023-08-15 DIAGNOSIS — R11.2 NAUSEA AND VOMITING, UNSPECIFIED VOMITING TYPE: ICD-10-CM

## 2023-08-15 DIAGNOSIS — R14.0 BLOATING: ICD-10-CM

## 2023-08-15 DIAGNOSIS — K59.09 OTHER CONSTIPATION: ICD-10-CM

## 2023-08-15 PROCEDURE — 82653 EL-1 FECAL QUANTITATIVE: CPT | Performed by: FAMILY MEDICINE

## 2023-08-18 LAB — ELASTASE 1, FECAL: >500 MCG/G

## 2024-02-15 ENCOUNTER — TELEPHONE (OUTPATIENT)
Dept: FAMILY MEDICINE | Facility: CLINIC | Age: 64
End: 2024-02-15
Payer: COMMERCIAL

## 2024-02-15 DIAGNOSIS — Z12.5 SCREENING PSA (PROSTATE SPECIFIC ANTIGEN): ICD-10-CM

## 2024-02-15 DIAGNOSIS — I10 ESSENTIAL HYPERTENSION: ICD-10-CM

## 2024-02-15 DIAGNOSIS — D51.8 OTHER VITAMIN B12 DEFICIENCY ANEMIA: Primary | ICD-10-CM

## 2024-02-15 DIAGNOSIS — E55.9 VITAMIN D DEFICIENCY: ICD-10-CM

## 2024-02-15 DIAGNOSIS — E78.49 OTHER HYPERLIPIDEMIA: ICD-10-CM

## 2024-02-15 DIAGNOSIS — Z00.00 VISIT FOR WELL MAN HEALTH CHECK: ICD-10-CM

## 2024-02-15 NOTE — TELEPHONE ENCOUNTER
Tried contacting patient multiple times just inform him labs have been added before his upcoming appointment with Dr Ott. Patient didn't answer. Unable leave vm.

## 2024-04-23 ENCOUNTER — LAB VISIT (OUTPATIENT)
Dept: LAB | Facility: HOSPITAL | Age: 64
End: 2024-04-23
Attending: FAMILY MEDICINE
Payer: COMMERCIAL

## 2024-04-23 DIAGNOSIS — E78.49 OTHER HYPERLIPIDEMIA: ICD-10-CM

## 2024-04-23 DIAGNOSIS — I10 ESSENTIAL HYPERTENSION: ICD-10-CM

## 2024-04-23 DIAGNOSIS — E55.9 VITAMIN D DEFICIENCY: ICD-10-CM

## 2024-04-23 DIAGNOSIS — Z12.5 SCREENING PSA (PROSTATE SPECIFIC ANTIGEN): ICD-10-CM

## 2024-04-23 DIAGNOSIS — D51.8 OTHER VITAMIN B12 DEFICIENCY ANEMIA: ICD-10-CM

## 2024-04-23 DIAGNOSIS — Z00.00 VISIT FOR WELL MAN HEALTH CHECK: ICD-10-CM

## 2024-04-23 LAB
25(OH)D3+25(OH)D2 SERPL-MCNC: 14 NG/ML (ref 30–96)
ALBUMIN SERPL BCP-MCNC: 3.9 G/DL (ref 3.5–5.2)
ALP SERPL-CCNC: 56 U/L (ref 55–135)
ALT SERPL W/O P-5'-P-CCNC: 10 U/L (ref 10–44)
ANION GAP SERPL CALC-SCNC: 9 MMOL/L (ref 8–16)
AST SERPL-CCNC: 13 U/L (ref 10–40)
BASOPHILS # BLD AUTO: 0.07 K/UL (ref 0–0.2)
BASOPHILS NFR BLD: 0.9 % (ref 0–1.9)
BILIRUB SERPL-MCNC: 0.8 MG/DL (ref 0.1–1)
BUN SERPL-MCNC: 13 MG/DL (ref 8–23)
CALCIUM SERPL-MCNC: 9.5 MG/DL (ref 8.7–10.5)
CHLORIDE SERPL-SCNC: 103 MMOL/L (ref 95–110)
CHOLEST SERPL-MCNC: 161 MG/DL (ref 120–199)
CHOLEST/HDLC SERPL: 3.1 {RATIO} (ref 2–5)
CO2 SERPL-SCNC: 30 MMOL/L (ref 23–29)
COMPLEXED PSA SERPL-MCNC: 0.81 NG/ML (ref 0–4)
CREAT SERPL-MCNC: 0.9 MG/DL (ref 0.5–1.4)
DIFFERENTIAL METHOD BLD: ABNORMAL
EOSINOPHIL # BLD AUTO: 0.3 K/UL (ref 0–0.5)
EOSINOPHIL NFR BLD: 4 % (ref 0–8)
ERYTHROCYTE [DISTWIDTH] IN BLOOD BY AUTOMATED COUNT: 14 % (ref 11.5–14.5)
EST. GFR  (NO RACE VARIABLE): >60 ML/MIN/1.73 M^2
ESTIMATED AVG GLUCOSE: 108 MG/DL (ref 68–131)
GLUCOSE SERPL-MCNC: 106 MG/DL (ref 70–110)
HBA1C MFR BLD: 5.4 % (ref 4–5.6)
HCT VFR BLD AUTO: 47.4 % (ref 40–54)
HDLC SERPL-MCNC: 52 MG/DL (ref 40–75)
HDLC SERPL: 32.3 % (ref 20–50)
HGB BLD-MCNC: 14.4 G/DL (ref 14–18)
IMM GRANULOCYTES # BLD AUTO: 0.02 K/UL (ref 0–0.04)
IMM GRANULOCYTES NFR BLD AUTO: 0.3 % (ref 0–0.5)
LDLC SERPL CALC-MCNC: 91 MG/DL (ref 63–159)
LYMPHOCYTES # BLD AUTO: 1.3 K/UL (ref 1–4.8)
LYMPHOCYTES NFR BLD: 16.5 % (ref 18–48)
MCH RBC QN AUTO: 28.9 PG (ref 27–31)
MCHC RBC AUTO-ENTMCNC: 30.4 G/DL (ref 32–36)
MCV RBC AUTO: 95 FL (ref 82–98)
MONOCYTES # BLD AUTO: 0.6 K/UL (ref 0.3–1)
MONOCYTES NFR BLD: 8.1 % (ref 4–15)
NEUTROPHILS # BLD AUTO: 5.4 K/UL (ref 1.8–7.7)
NEUTROPHILS NFR BLD: 70.2 % (ref 38–73)
NONHDLC SERPL-MCNC: 109 MG/DL
NRBC BLD-RTO: 0 /100 WBC
PLATELET # BLD AUTO: 203 K/UL (ref 150–450)
PMV BLD AUTO: 11 FL (ref 9.2–12.9)
POTASSIUM SERPL-SCNC: 4.1 MMOL/L (ref 3.5–5.1)
PROT SERPL-MCNC: 6.8 G/DL (ref 6–8.4)
RBC # BLD AUTO: 4.98 M/UL (ref 4.6–6.2)
SODIUM SERPL-SCNC: 142 MMOL/L (ref 136–145)
TRIGL SERPL-MCNC: 90 MG/DL (ref 30–150)
TSH SERPL DL<=0.005 MIU/L-ACNC: 1.74 UIU/ML (ref 0.4–4)
VIT B12 SERPL-MCNC: 219 PG/ML (ref 210–950)
WBC # BLD AUTO: 7.7 K/UL (ref 3.9–12.7)

## 2024-04-23 PROCEDURE — 84443 ASSAY THYROID STIM HORMONE: CPT | Performed by: FAMILY MEDICINE

## 2024-04-23 PROCEDURE — 82306 VITAMIN D 25 HYDROXY: CPT | Performed by: FAMILY MEDICINE

## 2024-04-23 PROCEDURE — 84153 ASSAY OF PSA TOTAL: CPT | Performed by: FAMILY MEDICINE

## 2024-04-23 PROCEDURE — 85025 COMPLETE CBC W/AUTO DIFF WBC: CPT | Performed by: FAMILY MEDICINE

## 2024-04-23 PROCEDURE — 83036 HEMOGLOBIN GLYCOSYLATED A1C: CPT | Performed by: FAMILY MEDICINE

## 2024-04-23 PROCEDURE — 80053 COMPREHEN METABOLIC PANEL: CPT | Performed by: FAMILY MEDICINE

## 2024-04-23 PROCEDURE — 80061 LIPID PANEL: CPT | Performed by: FAMILY MEDICINE

## 2024-04-23 PROCEDURE — 36415 COLL VENOUS BLD VENIPUNCTURE: CPT | Mod: PO | Performed by: FAMILY MEDICINE

## 2024-04-23 PROCEDURE — 82607 VITAMIN B-12: CPT | Performed by: FAMILY MEDICINE

## 2024-04-25 ENCOUNTER — PATIENT OUTREACH (OUTPATIENT)
Dept: ADMINISTRATIVE | Facility: HOSPITAL | Age: 64
End: 2024-04-25
Payer: COMMERCIAL

## 2024-04-25 ENCOUNTER — OFFICE VISIT (OUTPATIENT)
Dept: FAMILY MEDICINE | Facility: CLINIC | Age: 64
End: 2024-04-25
Payer: COMMERCIAL

## 2024-04-25 VITALS
HEART RATE: 71 BPM | SYSTOLIC BLOOD PRESSURE: 114 MMHG | DIASTOLIC BLOOD PRESSURE: 80 MMHG | WEIGHT: 246.25 LBS | HEIGHT: 78 IN | BODY MASS INDEX: 28.49 KG/M2 | OXYGEN SATURATION: 96 %

## 2024-04-25 DIAGNOSIS — E55.9 VITAMIN D DEFICIENCY: ICD-10-CM

## 2024-04-25 DIAGNOSIS — D51.8 OTHER VITAMIN B12 DEFICIENCY ANEMIA: ICD-10-CM

## 2024-04-25 DIAGNOSIS — R10.9 ABDOMINAL PAIN, UNSPECIFIED ABDOMINAL LOCATION: ICD-10-CM

## 2024-04-25 DIAGNOSIS — K59.00 CONSTIPATION, UNSPECIFIED CONSTIPATION TYPE: ICD-10-CM

## 2024-04-25 DIAGNOSIS — E78.49 OTHER HYPERLIPIDEMIA: ICD-10-CM

## 2024-04-25 DIAGNOSIS — H53.9 VISION CHANGES: ICD-10-CM

## 2024-04-25 DIAGNOSIS — Z23 NEED FOR DIPHTHERIA-TETANUS-PERTUSSIS (TDAP) VACCINE: ICD-10-CM

## 2024-04-25 DIAGNOSIS — I10 ESSENTIAL HYPERTENSION: ICD-10-CM

## 2024-04-25 DIAGNOSIS — L97.321 VENOUS STASIS ULCER OF LEFT ANKLE LIMITED TO BREAKDOWN OF SKIN, UNSPECIFIED WHETHER VARICOSE VEINS PRESENT: ICD-10-CM

## 2024-04-25 DIAGNOSIS — I48.91 ATRIAL FIBRILLATION, UNSPECIFIED TYPE: ICD-10-CM

## 2024-04-25 DIAGNOSIS — Z00.00 VISIT FOR WELL MAN HEALTH CHECK: Primary | ICD-10-CM

## 2024-04-25 DIAGNOSIS — I49.9 IRREGULAR HEART RATE: ICD-10-CM

## 2024-04-25 DIAGNOSIS — I83.023 VENOUS STASIS ULCER OF LEFT ANKLE LIMITED TO BREAKDOWN OF SKIN, UNSPECIFIED WHETHER VARICOSE VEINS PRESENT: ICD-10-CM

## 2024-04-25 LAB
OHS QRS DURATION: 100 MS
OHS QTC CALCULATION: 449 MS

## 2024-04-25 PROCEDURE — 90471 IMMUNIZATION ADMIN: CPT | Mod: S$GLB,,, | Performed by: FAMILY MEDICINE

## 2024-04-25 PROCEDURE — 3074F SYST BP LT 130 MM HG: CPT | Mod: CPTII,S$GLB,, | Performed by: FAMILY MEDICINE

## 2024-04-25 PROCEDURE — 93005 ELECTROCARDIOGRAM TRACING: CPT | Mod: S$GLB,,, | Performed by: FAMILY MEDICINE

## 2024-04-25 PROCEDURE — 99396 PREV VISIT EST AGE 40-64: CPT | Mod: 25,S$GLB,, | Performed by: FAMILY MEDICINE

## 2024-04-25 PROCEDURE — 99999 PR PBB SHADOW E&M-EST. PATIENT-LVL V: CPT | Mod: PBBFAC,,, | Performed by: FAMILY MEDICINE

## 2024-04-25 PROCEDURE — 3008F BODY MASS INDEX DOCD: CPT | Mod: CPTII,S$GLB,, | Performed by: FAMILY MEDICINE

## 2024-04-25 PROCEDURE — 1159F MED LIST DOCD IN RCRD: CPT | Mod: CPTII,S$GLB,, | Performed by: FAMILY MEDICINE

## 2024-04-25 PROCEDURE — 90715 TDAP VACCINE 7 YRS/> IM: CPT | Mod: S$GLB,,, | Performed by: FAMILY MEDICINE

## 2024-04-25 PROCEDURE — 4010F ACE/ARB THERAPY RXD/TAKEN: CPT | Mod: CPTII,S$GLB,, | Performed by: FAMILY MEDICINE

## 2024-04-25 PROCEDURE — 3044F HG A1C LEVEL LT 7.0%: CPT | Mod: CPTII,S$GLB,, | Performed by: FAMILY MEDICINE

## 2024-04-25 PROCEDURE — 93010 ELECTROCARDIOGRAM REPORT: CPT | Mod: S$GLB,,, | Performed by: STUDENT IN AN ORGANIZED HEALTH CARE EDUCATION/TRAINING PROGRAM

## 2024-04-25 PROCEDURE — 3079F DIAST BP 80-89 MM HG: CPT | Mod: CPTII,S$GLB,, | Performed by: FAMILY MEDICINE

## 2024-04-25 RX ORDER — ERGOCALCIFEROL 1.25 MG/1
CAPSULE ORAL
Qty: 12 CAPSULE | Refills: 3 | Status: SHIPPED | OUTPATIENT
Start: 2024-04-25

## 2024-04-25 RX ORDER — CHOLECALCIFEROL (VITAMIN D3) 25 MCG
5000 TABLET,CHEWABLE ORAL DAILY
Start: 2024-04-25

## 2024-04-25 RX ORDER — LOSARTAN POTASSIUM 100 MG/1
100 TABLET ORAL NIGHTLY
Qty: 90 TABLET | Refills: 1 | Status: SHIPPED | OUTPATIENT
Start: 2024-04-25 | End: 2025-04-25

## 2024-04-25 RX ORDER — ROSUVASTATIN CALCIUM 5 MG/1
5 TABLET, COATED ORAL DAILY
Qty: 90 TABLET | Refills: 3 | Status: SHIPPED | OUTPATIENT
Start: 2024-04-25

## 2024-04-25 NOTE — PROGRESS NOTES
"Subjective:       Patient ID: Kevan Norris is a 63 y.o. male.    Chief Complaint: Annual Exam      Kevan Norris is a 63 y.o. male who presents today for an annual exam    Diet: eats a lot of fried food at work. Salad at night time.   Exercise: not currently.     Colon Cancer Screening: Last Colonoscopy completed on 10/13/2022. Repeat in 7 years     HTN: on losartan. BP stable.   DLD: on crestor 5 mg. Reports missing some doses. Have missed the last few weeks.   Low vitamin D: on vitamin D not taking.   b12 def: still low. Normal ESR/CRP. Still missing doses.    Venous stasis ulcer: was seeing wound care. Has been d/c. No ulcer currently. Still has some VI, L>R.   Hematuria: saw urology, had repeat cystoscopy 1/18/2022. Per that note, "If still microscopic hematuria present in 5 years can consider repeat workup (1/2027)"    IBS-C: linzess and amitiza not covered.     Had diverticulitis about a year ago. Resolved after abx. However, reports symptoms have returned for the last few months.     On exam, appears to have an irregular rhythm. Upon questioning, does intermittently report some palpitations, intermittently.      PMHx: reviewed in EMR and updated  Meds: reviewed in EMR and updated  Shx: reviewed in EMR and updated  FMHx: no family history of colon cancer, breast cancer, ovarian cancer  Social: works in a restaurant. He lives alone. No children. Two sisters, two brothers. One sister lives in [facility]. One sister lives in [place]. One dog at home.              Review of Systems   Constitutional:  Negative for chills and fever.   Respiratory:  Negative for shortness of breath.    Cardiovascular:  Negative for chest pain.   Gastrointestinal:  Positive for abdominal pain and constipation. Negative for nausea and vomiting.   Neurological:  Negative for dizziness, light-headedness and headaches.         Health Maintenance Due   Topic Date Due    HIV Screening  Never done    Shingles Vaccine (1 of 2) " Never done    RSV Vaccine (Age 60+ and Pregnant patients) (1 - 1-dose 60+ series) Never done    COVID-19 Vaccine (3 - 2023-24 season) 09/01/2023     Immunization History   Administered Date(s) Administered    COVID-19, MRNA, LN-S, PF (MODERNA FULL 0.5 ML DOSE) 09/23/2021, 10/21/2021    Influenza - Quadrivalent - PF *Preferred* (6 months and older) 09/07/2017, 10/21/2021    Influenza - Trivalent (ADULT) 10/25/2010, 09/20/2011    Influenza - Trivalent - PF (ADULT) 09/08/2012, 09/30/2015    Pneumococcal Polysaccharide - 23 Valent 10/25/2010, 11/13/2021    Tdap 04/25/2024           Results for orders placed or performed in visit on 04/23/24   Comprehensive Metabolic Panel   Result Value Ref Range    Sodium 142 136 - 145 mmol/L    Potassium 4.1 3.5 - 5.1 mmol/L    Chloride 103 95 - 110 mmol/L    CO2 30 (H) 23 - 29 mmol/L    Glucose 106 70 - 110 mg/dL    BUN 13 8 - 23 mg/dL    Creatinine 0.9 0.5 - 1.4 mg/dL    Calcium 9.5 8.7 - 10.5 mg/dL    Total Protein 6.8 6.0 - 8.4 g/dL    Albumin 3.9 3.5 - 5.2 g/dL    Total Bilirubin 0.8 0.1 - 1.0 mg/dL    Alkaline Phosphatase 56 55 - 135 U/L    AST 13 10 - 40 U/L    ALT 10 10 - 44 U/L    eGFR >60.0 >60 mL/min/1.73 m^2    Anion Gap 9 8 - 16 mmol/L   CBC Auto Differential   Result Value Ref Range    WBC 7.70 3.90 - 12.70 K/uL    RBC 4.98 4.60 - 6.20 M/uL    Hemoglobin 14.4 14.0 - 18.0 g/dL    Hematocrit 47.4 40.0 - 54.0 %    MCV 95 82 - 98 fL    MCH 28.9 27.0 - 31.0 pg    MCHC 30.4 (L) 32.0 - 36.0 g/dL    RDW 14.0 11.5 - 14.5 %    Platelets 203 150 - 450 K/uL    MPV 11.0 9.2 - 12.9 fL    Immature Granulocytes 0.3 0.0 - 0.5 %    Gran # (ANC) 5.4 1.8 - 7.7 K/uL    Immature Grans (Abs) 0.02 0.00 - 0.04 K/uL    Lymph # 1.3 1.0 - 4.8 K/uL    Mono # 0.6 0.3 - 1.0 K/uL    Eos # 0.3 0.0 - 0.5 K/uL    Baso # 0.07 0.00 - 0.20 K/uL    nRBC 0 0 /100 WBC    Gran % 70.2 38.0 - 73.0 %    Lymph % 16.5 (L) 18.0 - 48.0 %    Mono % 8.1 4.0 - 15.0 %    Eosinophil % 4.0 0.0 - 8.0 %    Basophil % 0.9 0.0 -  "1.9 %    Differential Method Automated    Hemoglobin A1C   Result Value Ref Range    Hemoglobin A1C 5.4 4.0 - 5.6 %    Estimated Avg Glucose 108 68 - 131 mg/dL   Lipid Panel   Result Value Ref Range    Cholesterol 161 120 - 199 mg/dL    Triglycerides 90 30 - 150 mg/dL    HDL 52 40 - 75 mg/dL    LDL Cholesterol 91.0 63.0 - 159.0 mg/dL    HDL/Cholesterol Ratio 32.3 20.0 - 50.0 %    Total Cholesterol/HDL Ratio 3.1 2.0 - 5.0    Non-HDL Cholesterol 109 mg/dL   TSH   Result Value Ref Range    TSH 1.742 0.400 - 4.000 uIU/mL   PSA, Screening   Result Value Ref Range    PSA, Screen 0.81 0.00 - 4.00 ng/mL   Vitamin D   Result Value Ref Range    Vit D, 25-Hydroxy 14 (L) 30 - 96 ng/mL   Vitamin B12   Result Value Ref Range    Vitamin B-12 219 210 - 950 pg/mL       Objective:     Vitals:    04/25/24 1415   BP: 114/80   Pulse: 71   SpO2: 96%   Weight: 111.7 kg (246 lb 4.1 oz)   Height: 6' 7" (2.007 m)        Physical Exam  Constitutional:       General: He is not in acute distress.     Appearance: He is not ill-appearing, toxic-appearing or diaphoretic.   HENT:      Nose: No congestion or rhinorrhea.      Mouth/Throat:      Pharynx: No oropharyngeal exudate or posterior oropharyngeal erythema.   Cardiovascular:      Rate and Rhythm: Normal rate. Rhythm irregularly irregular.   Pulmonary:      Effort: Pulmonary effort is normal. No respiratory distress.      Breath sounds: Normal breath sounds. No wheezing.   Abdominal:      General: There is no distension.      Palpations: Abdomen is soft.      Tenderness: There is generalized abdominal tenderness. There is no right CVA tenderness, left CVA tenderness, guarding or rebound.   Musculoskeletal:         General: Swelling present.      Comments: Able to examine legs today  Varicose veins noted BL  Changes consistent with darkening of skin from chronic venous insufficiency noted BL  No wounds noted   Neurological:      General: No focal deficit present.      Mental Status: He is alert. "   Psychiatric:         Mood and Affect: Mood normal.         Behavior: Behavior normal.         Thought Content: Thought content normal.         Judgment: Judgment normal.         Assessment:       1. Visit for well man health check    2. Essential hypertension    3. Other hyperlipidemia    4. Vitamin D deficiency    5. Other vitamin B12 deficiency anemia    6. Venous stasis ulcer of left ankle limited to breakdown of skin, unspecified whether varicose veins present    7. Need for diphtheria-tetanus-pertussis (Tdap) vaccine    8. Constipation, unspecified constipation type    9. Abdominal pain, unspecified abdominal location    10. Vision changes    11. Irregular heart rate    12. Atrial fibrillation, unspecified type        Plan:       Take losartan  Take crestor  You have low vitamin D and a Rx has been sent to your pharmacy. Take this pill once a week and when the prescription and refills are done, start taking an OTC vitamin D supplementation at 1000 IU daily.   You have low B12. Goal >400. Start daily OTC B12 supplementation at 2500 mcg    If recurrent diverticulitis, refer to colorectal  If no diverticulitis, may try different medication for constipation, lactulose?    On exam, appear to be irregular irregular.   EKG shows a fib  Discussed what this means  Started anticoagulation  Discussed risks of anticoagulation, discussed with any head trauma, needs to go to the ER  Patient has been on anticoagulation in the past for a DVT, about 14 years ago, was on coumadin.   Referred to EP  Will order ECHO    Chadsvasc2 score: 3 (DVT history, HTN). Almost 4, with age almost 64    F/u 3 months. F/u 6 months.     Visit for well man health check    Essential hypertension  -     losartan (COZAAR) 100 MG tablet; Take 1 tablet (100 mg total) by mouth every evening. For blood pressure.  Dispense: 90 tablet; Refill: 1    Other hyperlipidemia  -     rosuvastatin (CRESTOR) 5 MG tablet; Take 1 tablet (5 mg total) by mouth once  daily. For cholesterol  Dispense: 90 tablet; Refill: 3    Vitamin D deficiency  -     ergocalciferol (ERGOCALCIFEROL) 50,000 unit Cap; TAKE 1 CAPSULE BY MOUTH EVERY 7 DAYS. THEN START DAILY OTC REPLACEMENT AFTER THIS RX IS COMPLETE  Dispense: 12 capsule; Refill: 3    Other vitamin B12 deficiency anemia  -     cyanocobalamin, vitamin B-12, 2,500 mcg Tab; Take 5,000 mcg by mouth once daily.    Venous stasis ulcer of left ankle limited to breakdown of skin, unspecified whether varicose veins present    Need for diphtheria-tetanus-pertussis (Tdap) vaccine  -     Tdap (BOOSTRIX) vaccine injection 0.5 mL    Constipation, unspecified constipation type  -     CT Abdomen Pelvis With IV Contrast Routine Oral Contrast; Future; Expected date: 04/25/2024    Abdominal pain, unspecified abdominal location  -     CT Abdomen Pelvis With IV Contrast Routine Oral Contrast; Future; Expected date: 04/25/2024    Vision changes  -     Ambulatory referral/consult to Optometry; Future; Expected date: 05/02/2024    Irregular heart rate  -     IN OFFICE EKG 12-LEAD (to Muse)    Atrial fibrillation, unspecified type  -     apixaban (ELIQUIS) 5 mg Tab; Take 1 tablet (5 mg total) by mouth 2 (two) times daily.  Dispense: 180 tablet; Refill: 3  -     Echo; Future  -     Ambulatory referral/consult to Cardiac Electrophysiology; Future; Expected date: 05/02/2024

## 2024-04-25 NOTE — PATIENT INSTRUCTIONS
Take losartan  Take crestor  You have low vitamin D and a Rx has been sent to your pharmacy. Take this pill once a week and when the prescription and refills are done, start taking an OTC vitamin D supplementation at 1000 IU daily.   You have low B12. Goal >400. Start daily OTC B12 supplementation at 2500 mcg    If recurrent diverticulitis, refer to colorectal  If no diverticulitis, may try different medication for constipation, lactulose?    On exam, appear to be irregular irregular.   EKG shows a fib  Discussed what this means  Started anticoagulation  Discussed risks of anticoagulation, discussed with any head trauma, needs to go to the ER  Patient has been on anticoagulation in the past for a DVT, about 14 years ago, was on coumadin.   Referred to EP  Will get ECHO prior to EP apt    Chadsvasc2 score: 3 (DVT history, HTN). Almost 4, with age almost 64    F/u 3 months. F/u 6 months.

## 2024-04-25 NOTE — PROGRESS NOTES
Population Health Chart Review & Patient Outreach Details      Additional [facility] Health Notes:    Pt declined flu vaccine 04/25/2024.           Updates Requested / Reviewed:      Updated Care Coordination Note, Care Everywhere, and Immunizations Reconciliation Completed or Queried: [facility] Topics Overdue:      [facility] Score: 0     Patient is not due for any topics at this time.    Tetanus Vaccine  Shingles/Zoster Vaccine  RSV Vaccine                  Health Maintenance Topic(s) Outreach Outcomes & Actions Taken:

## 2024-04-26 ENCOUNTER — OFFICE VISIT (OUTPATIENT)
Dept: CARDIOLOGY | Facility: CLINIC | Age: 64
End: 2024-04-26
Payer: COMMERCIAL

## 2024-04-26 VITALS
HEIGHT: 78 IN | WEIGHT: 240 LBS | BODY MASS INDEX: 27.77 KG/M2 | HEART RATE: 70 BPM | DIASTOLIC BLOOD PRESSURE: 68 MMHG | SYSTOLIC BLOOD PRESSURE: 140 MMHG

## 2024-04-26 DIAGNOSIS — I48.91 NEW ONSET ATRIAL FIBRILLATION: Primary | ICD-10-CM

## 2024-04-26 DIAGNOSIS — I70.0 AORTIC ATHEROSCLEROSIS: ICD-10-CM

## 2024-04-26 DIAGNOSIS — I48.91 ATRIAL FIBRILLATION, UNSPECIFIED TYPE: ICD-10-CM

## 2024-04-26 DIAGNOSIS — I10 ESSENTIAL HYPERTENSION: ICD-10-CM

## 2024-04-26 PROCEDURE — 99204 OFFICE O/P NEW MOD 45 MIN: CPT | Mod: S$GLB,,, | Performed by: INTERNAL MEDICINE

## 2024-04-26 PROCEDURE — 1159F MED LIST DOCD IN RCRD: CPT | Mod: CPTII,S$GLB,, | Performed by: INTERNAL MEDICINE

## 2024-04-26 PROCEDURE — 3078F DIAST BP <80 MM HG: CPT | Mod: CPTII,S$GLB,, | Performed by: INTERNAL MEDICINE

## 2024-04-26 PROCEDURE — 3008F BODY MASS INDEX DOCD: CPT | Mod: CPTII,S$GLB,, | Performed by: INTERNAL MEDICINE

## 2024-04-26 PROCEDURE — 99999 PR PBB SHADOW E&M-EST. PATIENT-LVL IV: CPT | Mod: PBBFAC,,, | Performed by: INTERNAL MEDICINE

## 2024-04-26 PROCEDURE — 3077F SYST BP >= 140 MM HG: CPT | Mod: CPTII,S$GLB,, | Performed by: INTERNAL MEDICINE

## 2024-04-26 PROCEDURE — 3044F HG A1C LEVEL LT 7.0%: CPT | Mod: CPTII,S$GLB,, | Performed by: INTERNAL MEDICINE

## 2024-04-26 PROCEDURE — 1160F RVW MEDS BY RX/DR IN RCRD: CPT | Mod: CPTII,S$GLB,, | Performed by: INTERNAL MEDICINE

## 2024-04-26 PROCEDURE — 4010F ACE/ARB THERAPY RXD/TAKEN: CPT | Mod: CPTII,S$GLB,, | Performed by: INTERNAL MEDICINE

## 2024-04-26 NOTE — PROGRESS NOTES
Subjective   Patient ID:  Kevan Norris is a 63 y.o. male who presents for evaluation of Atrial Fibrillation    Referring Physician: Jose A Ott,     HPI  I had the pleasure of seeing Mr. Norris today in our electrophysiology clinic in consultation for his atrial fibrillation. As you are aware he is a pleasant 63 year-old man with hypertension, aortic atherosclerosis observed on prior CT scan, prior DVT treated with coumadin, microscopic hematuria, and IBS. He presented recently to Dr. Ott for annual follow-up. He was observed to be in atrial fibrillation. Eliquis was started. An ECHO is pending. Recent TSH was normal. He has no obvious symptoms. His main issue currently is recurrent diverticulitis.    I reviewed available ECGs in EPIC which show either rate controlled AF or normal sinus rhythm. Last ECG prior to recent showing AF was in 2013.      Review of Systems   Constitutional: Negative for fever and malaise/fatigue.   HENT:  Negative for congestion and sore throat.    Eyes:  Negative for blurred vision and visual disturbance.   Cardiovascular:  Negative for chest pain, dyspnea on exertion, irregular heartbeat, near-syncope, palpitations and syncope.   Respiratory:  Negative for cough and shortness of breath.    Hematologic/Lymphatic: Negative for bleeding problem. Does not bruise/bleed easily.   Skin: Negative.    Musculoskeletal: Negative.    Gastrointestinal:  Negative for bloating, abdominal pain, hematochezia and melena.   Neurological:  Negative for focal weakness and weakness.   Psychiatric/Behavioral: Negative.            Objective     Physical Exam  Vitals reviewed.   Constitutional:       General: He is not in acute distress.     Appearance: He is well-developed. He is not diaphoretic.   HENT:      Head: Normocephalic and atraumatic.   Eyes:      General:         Right eye: No discharge.         Left eye: No discharge.      Conjunctiva/sclera: Conjunctivae normal.   Cardiovascular:       Rate and Rhythm: Normal rate. Rhythm irregularly irregular.      Heart sounds: No murmur heard.     No friction rub. No gallop.   Pulmonary:      Effort: Pulmonary effort is normal. No respiratory distress.      Breath sounds: Normal breath sounds. No wheezing or rales.   Abdominal:      General: Bowel sounds are normal. There is no distension.      Palpations: Abdomen is soft.      Tenderness: There is no abdominal tenderness.   Musculoskeletal:      Cervical back: Neck supple.   Skin:     General: Skin is warm and dry.   Neurological:      Mental Status: He is alert and oriented to person, place, and time.   Psychiatric:         Behavior: Behavior normal.         Thought Content: Thought content normal.         Judgment: Judgment normal.            Assessment and Plan     1. New onset atrial fibrillation    2. Essential hypertension    3. Aortic atherosclerosis    4. Atrial fibrillation, unspecified type        Plan:  In summary, Mr. Norris is a pleasant 63 year-old man with hypertension, aortic atherosclerosis observed on prior CT scan, prior DVT treated with coumadin, microscopic hematuria, and IBS presenting for evaluation of newly recognized atrial fibrillation. I had a long discussion with the patient about the pathophysiology and risks of atrial fibrillation and its basic pathophysiology, including its health implications and treatment options. Specifically, I addressed the need for CVA (stroke) prophylaxis with aspirin versus oral anticoagulation (warfarin vs DOACs, discussed bleeding risks, and need to come to the ER for any head trauma for CT scanning even if asymptomatic). [id number] score is 2 and oral anticoagulation is recommended. He is on eliquis. I also discussed the goal to reduce symptomatic arrhythmic episodes by pharmacologic and/or procedural methods and utilizing a rhythm versus a rate control strategy. Discussed also potential role of rhythm control in lowering morbidity with early rhythm  control. Would get a holter monitor to assess for persistence and if so would recommend NUVIA guided DCCV then start anti-arrhythmic drug therapy to assess for latent symptoms.    Thank you for allowing me to participate in the care of this patient. Please do not hesitate to call me with any questions or concerns.    Sanchez Boucher MD, PhD  Cardiac Electrophysiology

## 2024-04-30 ENCOUNTER — HOSPITAL ENCOUNTER (OUTPATIENT)
Dept: CARDIOLOGY | Facility: HOSPITAL | Age: 64
Discharge: HOME | End: 2024-04-30
Attending: FAMILY MEDICINE
Payer: COMMERCIAL

## 2024-04-30 ENCOUNTER — TELEPHONE (OUTPATIENT)
Dept: FAMILY MEDICINE | Facility: CLINIC | Age: 64
End: 2024-04-30
Payer: COMMERCIAL

## 2024-04-30 ENCOUNTER — HOSPITAL ENCOUNTER (OUTPATIENT)
Dept: RADIOLOGY | Facility: HOSPITAL | Age: 64
Discharge: HOME | End: 2024-04-30
Attending: FAMILY MEDICINE
Payer: COMMERCIAL

## 2024-04-30 VITALS — HEIGHT: 78 IN | BODY MASS INDEX: 27.77 KG/M2 | WEIGHT: 240 LBS

## 2024-04-30 DIAGNOSIS — I48.91 ATRIAL FIBRILLATION, UNSPECIFIED TYPE: ICD-10-CM

## 2024-04-30 DIAGNOSIS — K59.00 CONSTIPATION, UNSPECIFIED CONSTIPATION TYPE: ICD-10-CM

## 2024-04-30 DIAGNOSIS — K57.92 DIVERTICULITIS: Primary | ICD-10-CM

## 2024-04-30 DIAGNOSIS — R10.9 ABDOMINAL PAIN, UNSPECIFIED ABDOMINAL LOCATION: ICD-10-CM

## 2024-04-30 LAB
ASCENDING AORTA: 3.85 CM
AV INDEX (PROSTH): 0.7
AV MEAN GRADIENT: 4 MMHG
AV PEAK GRADIENT: 6 MMHG
AV VALVE AREA BY VELOCITY RATIO: 2.86 CM²
AV VALVE AREA: 3.17 CM²
AV VELOCITY RATIO: 0.63
BSA FOR ECHO PROCEDURE: 2.46 M2
CV ECHO LV RWT: 0.44 CM
DOP CALC AO PEAK VEL: 1.18 M/S
DOP CALC AO VTI: 19.7 CM
DOP CALC LVOT AREA: 4.6 CM2
DOP CALC LVOT DIAMETER: 2.41 CM
DOP CALC LVOT PEAK VEL: 0.74 M/S
DOP CALC LVOT STROKE VOLUME: 62.46 CM3
DOP CALC MV VTI: 18 CM
DOP CALCLVOT PEAK VEL VTI: 13.7 CM
E/E' RATIO: 12.62 M/S
ECHO LV POSTERIOR WALL: 1.31 CM (ref 0.6–1.1)
FRACTIONAL SHORTENING: 17 % (ref 28–44)
INTERVENTRICULAR SEPTUM: 1.19 CM (ref 0.6–1.1)
IVC DIAMETER: 2.41 CM
LA MAJOR: 7.13 CM
LA MINOR: 6.87 CM
LA WIDTH: 4.8 CM
LEFT ATRIUM SIZE: 4.84 CM
LEFT ATRIUM VOLUME INDEX MOD: 36.4 ML/M2
LEFT ATRIUM VOLUME INDEX: 56.2 ML/M2
LEFT ATRIUM VOLUME MOD: 89.44 CM3
LEFT ATRIUM VOLUME: 138.18 CM3
LEFT INTERNAL DIMENSION IN SYSTOLE: 4.92 CM (ref 2.1–4)
LEFT VENTRICLE DIASTOLIC VOLUME INDEX: 70.33 ML/M2
LEFT VENTRICLE DIASTOLIC VOLUME: 173.02 ML
LEFT VENTRICLE MASS INDEX: 131 G/M2
LEFT VENTRICLE SYSTOLIC VOLUME INDEX: 46.4 ML/M2
LEFT VENTRICLE SYSTOLIC VOLUME: 114.04 ML
LEFT VENTRICULAR INTERNAL DIMENSION IN DIASTOLE: 5.9 CM (ref 3.5–6)
LEFT VENTRICULAR MASS: 322.85 G
LV LATERAL E/E' RATIO: 11.71 M/S
LV SEPTAL E/E' RATIO: 13.67 M/S
LVOT MG: 1.19 MMHG
LVOT MV: 0.52 CM/S
MV MEAN GRADIENT: 1 MMHG
MV PEAK E VEL: 0.82 M/S
MV PEAK GRADIENT: 3 MMHG
MV VALVE AREA BY CONTINUITY EQUATION: 3.47 CM2
OHS LV EJECTION FRACTION SIMPSONS BIPLANE MOD: 32 %
PISA RADIUS: 0.99 CM
PISA TR MAX VEL: 2.57 M/S
PISA VN NYQUIST MS: 0.33 M/S
PISA VN NYQUIST: 0.33 M/S
PV MV: 0.73 M/S
PV PEAK GRADIENT: 3 MMHG
PV PEAK VELOCITY: 0.93 M/S
RA MAJOR: 6.05 CM
RA PRESSURE ESTIMATED: 8 MMHG
RA WIDTH: 5.39 CM
RV TB RVSP: 11 MMHG
RV TISSUE DOPPLER FREE WALL SYSTOLIC VELOCITY 1 (APICAL 4 CHAMBER VIEW): 9.39 CM/S
SINUS: 3.69 CM
STJ: 3.32 CM
TDI LATERAL: 0.07 M/S
TDI SEPTAL: 0.06 M/S
TDI: 0.07 M/S
TR MAX PG: 26 MMHG
TRICUSPID ANNULAR PLANE SYSTOLIC EXCURSION: 1.23 CM
TV REST PULMONARY ARTERY PRESSURE: 34 MMHG
Z-SCORE OF LEFT VENTRICULAR DIMENSION IN END DIASTOLE: -7.57
Z-SCORE OF LEFT VENTRICULAR DIMENSION IN END SYSTOLE: -3.33

## 2024-04-30 PROCEDURE — C8929 TTE W OR WO FOL WCON,DOPPLER: HCPCS

## 2024-04-30 PROCEDURE — 74177 CT ABD & PELVIS W/CONTRAST: CPT | Mod: TC

## 2024-04-30 PROCEDURE — A9698 NON-RAD CONTRAST MATERIALNOC: HCPCS | Performed by: FAMILY MEDICINE

## 2024-04-30 PROCEDURE — 25500020 PHARM REV CODE 255: Performed by: INTERNAL MEDICINE

## 2024-04-30 PROCEDURE — 74177 CT ABD & PELVIS W/CONTRAST: CPT | Mod: 26,,, | Performed by: RADIOLOGY

## 2024-04-30 PROCEDURE — 25500020 PHARM REV CODE 255: Performed by: FAMILY MEDICINE

## 2024-04-30 PROCEDURE — 93306 TTE W/DOPPLER COMPLETE: CPT | Mod: 26,,, | Performed by: STUDENT IN AN ORGANIZED HEALTH CARE EDUCATION/TRAINING PROGRAM

## 2024-04-30 RX ORDER — METRONIDAZOLE 500 MG/1
500 TABLET ORAL 3 TIMES DAILY
Qty: 30 TABLET | Refills: 0 | Status: SHIPPED | OUTPATIENT
Start: 2024-04-30 | End: 2024-05-10

## 2024-04-30 RX ORDER — CIPROFLOXACIN 500 MG/1
500 TABLET ORAL 2 TIMES DAILY
Qty: 20 TABLET | Refills: 0 | Status: SHIPPED | OUTPATIENT
Start: 2024-04-30 | End: 2024-05-10

## 2024-04-30 RX ADMIN — HUMAN ALBUMIN MICROSPHERES AND PERFLUTREN 0.11 MG: 10; .22 INJECTION, SOLUTION INTRAVENOUS at 03:04

## 2024-04-30 RX ADMIN — IOHEXOL 1000 ML: 12 SOLUTION ORAL at 01:04

## 2024-04-30 RX ADMIN — IOHEXOL 100 ML: 350 INJECTION, SOLUTION INTRAVENOUS at 02:04

## 2024-04-30 NOTE — TELEPHONE ENCOUNTER
Please call patient  He appears to have diverticulitis again    This is inflammation and an infection  I have sent him antibiotics  He should follow a clear liquid diet (jello, chicken broth, clear juices, etc) x 72 hours  Advance diet as tolerated     If symptoms persist or worsen, go to the ER    He appears to have recurrent episodes of this  I do NOT think he needs surgery, but I will place a referral for him to see one so they can give their opinion    Please confirm you have reached patient by messaging me back. Thank you

## 2024-05-01 ENCOUNTER — TELEPHONE (OUTPATIENT)
Dept: FAMILY MEDICINE | Facility: CLINIC | Age: 64
End: 2024-05-01
Payer: COMMERCIAL

## 2024-05-01 ENCOUNTER — TELEPHONE (OUTPATIENT)
Dept: ELECTROPHYSIOLOGY | Facility: CLINIC | Age: 64
End: 2024-05-01
Payer: COMMERCIAL

## 2024-05-01 DIAGNOSIS — I51.7 LEFT ATRIAL DILATION: Primary | ICD-10-CM

## 2024-05-01 DIAGNOSIS — R93.1 ABNORMAL ECHOCARDIOGRAM: ICD-10-CM

## 2024-05-01 DIAGNOSIS — R93.1 DECREASED CARDIAC EJECTION FRACTION: ICD-10-CM

## 2024-05-01 NOTE — TELEPHONE ENCOUNTER
Tried to reach patient to discuss ECHO findings and recommend we proceed with NUVIA/DCCV as discussed in clinic. No answer and unable to leave a voicemail. He also does not appear to have portal profile set up for messaging.

## 2024-05-01 NOTE — TELEPHONE ENCOUNTER
Spoke to patient in regards to recent results, medication and diet change. Patient verbalized understanding.

## 2024-05-01 NOTE — TELEPHONE ENCOUNTER
Spoke to patient in regards to recent echo results. Patient verbalized understanding. Set patient up to see cardiology.

## 2024-05-02 ENCOUNTER — TELEPHONE (OUTPATIENT)
Dept: ELECTROPHYSIOLOGY | Facility: CLINIC | Age: 64
End: 2024-05-02
Payer: COMMERCIAL

## 2024-05-02 NOTE — TELEPHONE ENCOUNTER
Attempted to reach patient again to discuss ECHO findings and recommend cardioversion. No answer and unable to leave voicemail.

## 2024-05-02 NOTE — TELEPHONE ENCOUNTER
Spoke to patient informed him Dr Boucher was trying to reach him. Gave patient the office number for him to give them a call back. Patient will contact office.

## 2024-05-03 ENCOUNTER — TELEPHONE (OUTPATIENT)
Dept: ELECTROPHYSIOLOGY | Facility: CLINIC | Age: 64
End: 2024-05-03
Payer: COMMERCIAL

## 2024-05-07 ENCOUNTER — TELEPHONE (OUTPATIENT)
Dept: ELECTROPHYSIOLOGY | Facility: CLINIC | Age: 64
End: 2024-05-07
Payer: COMMERCIAL

## 2024-05-07 NOTE — TELEPHONE ENCOUNTER
Spoke with patient and advised that Dr Boucher has  tried to contact him  to discuss echo results and recommendations for  NUVIA/DCCV .  Patient said that he did receive Dr Boucher VM and tried to return the call but was unable get through . States that he will be available to receive a call anytime after 1 pm today at [phone] (Home Phone). Advised that I would let Dr Boucher know and ask that he contact him at that time.

## 2024-05-08 ENCOUNTER — TELEPHONE (OUTPATIENT)
Dept: ELECTROPHYSIOLOGY | Facility: CLINIC | Age: 64
End: 2024-05-08
Payer: COMMERCIAL

## 2024-05-08 NOTE — TELEPHONE ENCOUNTER
Spoke with patient regarding reduced LVEF and recommendation for NUVIA/DCCV. Would then start amiodarone. He reported he picked up eliquis and it was $1000 for 1 month. I asked him to check if dabigatran would be cheaper with his insurance. If not then will ultimately need to switch to warfarin. He stated he would and agreed to NUVIA/DCCV.

## 2024-05-13 DIAGNOSIS — I48.91 NEW ONSET ATRIAL FIBRILLATION: Primary | ICD-10-CM

## 2024-05-14 ENCOUNTER — OFFICE VISIT (OUTPATIENT)
Dept: CARDIOLOGY | Facility: CLINIC | Age: 64
End: 2024-05-14
Payer: COMMERCIAL

## 2024-05-14 VITALS
WEIGHT: 238.56 LBS | OXYGEN SATURATION: 97 % | SYSTOLIC BLOOD PRESSURE: 137 MMHG | BODY MASS INDEX: 27.6 KG/M2 | DIASTOLIC BLOOD PRESSURE: 82 MMHG | HEART RATE: 77 BPM | HEIGHT: 78 IN

## 2024-05-14 DIAGNOSIS — I70.0 AORTIC ATHEROSCLEROSIS: ICD-10-CM

## 2024-05-14 DIAGNOSIS — I51.7 LEFT ATRIAL DILATION: ICD-10-CM

## 2024-05-14 DIAGNOSIS — R93.1 ABNORMAL ECHOCARDIOGRAM: ICD-10-CM

## 2024-05-14 DIAGNOSIS — I48.91 ATRIAL FIBRILLATION, UNSPECIFIED TYPE: Primary | ICD-10-CM

## 2024-05-14 DIAGNOSIS — I87.2 VENOUS (PERIPHERAL) INSUFFICIENCY: ICD-10-CM

## 2024-05-14 DIAGNOSIS — R93.1 DECREASED CARDIAC EJECTION FRACTION: ICD-10-CM

## 2024-05-14 DIAGNOSIS — I48.91 NEW ONSET ATRIAL FIBRILLATION: ICD-10-CM

## 2024-05-14 PROCEDURE — 99205 OFFICE O/P NEW HI 60 MIN: CPT | Mod: S$GLB,,, | Performed by: STUDENT IN AN ORGANIZED HEALTH CARE EDUCATION/TRAINING PROGRAM

## 2024-05-14 PROCEDURE — 3075F SYST BP GE 130 - 139MM HG: CPT | Mod: CPTII,S$GLB,, | Performed by: STUDENT IN AN ORGANIZED HEALTH CARE EDUCATION/TRAINING PROGRAM

## 2024-05-14 PROCEDURE — 99999 PR PBB SHADOW E&M-EST. PATIENT-LVL V: CPT | Mod: PBBFAC,,, | Performed by: STUDENT IN AN ORGANIZED HEALTH CARE EDUCATION/TRAINING PROGRAM

## 2024-05-14 PROCEDURE — 93000 ELECTROCARDIOGRAM COMPLETE: CPT | Mod: S$GLB,,, | Performed by: INTERNAL MEDICINE

## 2024-05-14 PROCEDURE — 3079F DIAST BP 80-89 MM HG: CPT | Mod: CPTII,S$GLB,, | Performed by: STUDENT IN AN ORGANIZED HEALTH CARE EDUCATION/TRAINING PROGRAM

## 2024-05-14 PROCEDURE — 3044F HG A1C LEVEL LT 7.0%: CPT | Mod: CPTII,S$GLB,, | Performed by: STUDENT IN AN ORGANIZED HEALTH CARE EDUCATION/TRAINING PROGRAM

## 2024-05-14 PROCEDURE — 1159F MED LIST DOCD IN RCRD: CPT | Mod: CPTII,S$GLB,, | Performed by: STUDENT IN AN ORGANIZED HEALTH CARE EDUCATION/TRAINING PROGRAM

## 2024-05-14 PROCEDURE — 4010F ACE/ARB THERAPY RXD/TAKEN: CPT | Mod: CPTII,S$GLB,, | Performed by: STUDENT IN AN ORGANIZED HEALTH CARE EDUCATION/TRAINING PROGRAM

## 2024-05-14 PROCEDURE — 3008F BODY MASS INDEX DOCD: CPT | Mod: CPTII,S$GLB,, | Performed by: STUDENT IN AN ORGANIZED HEALTH CARE EDUCATION/TRAINING PROGRAM

## 2024-05-14 NOTE — PROGRESS NOTES
"Subjective:   @Patient ID:  Kevan Norris is a 63 y.o. male who presents for evaluation of Hyperlipidemia and Hypertension      HPI:   Mr Norris is pleasant 63 year-old man with venous insufficiency, hypertension, aortic atherosclerosis observed on prior CT scan, prior DVT treated with coumadin, microscopic hematuria, and IBS recently diagnosed with afib. Precently by Dr Page and is scheduled for NUVIA/DCCV then start AAD. Will defer to EP for further management. Denies cp, sob, palpitations, presyncope/dizziness, syncope, orthopnea, PND, bendopnea, decrease ET, NVDC, fever, chills.        Patient Active Problem List    Diagnosis Date Noted    Aortic atherosclerosis 04/26/2024    New onset atrial fibrillation 04/25/2024    Diverticulosis in the sigmoid colon, in the desending colon, and the transverse colon 12/15/2022    Venous insufficiency of lower extremity 06/15/2022    Asymptomatic microscopic hematuria 12/05/2021    Vitamin D deficiency 12/05/2021    Anemia due to vitamin B12 deficiency 12/05/2021    Other hyperlipidemia 12/02/2021    Chronic gout without tophus 12/02/2021    GERD without esophagitis 12/02/2021    Essential hypertension 12/02/2021    BMI 27.0-27.9,adult 12/02/2021    History of DVT (deep vein thrombosis) 12/02/2021    Varicose veins of left lower extremity with pain 12/02/2021    Umbilical hernia without obstruction and without gangrene 12/02/2021                    LABS  CBC  @LABRCNTIP(WBC:3,RBC:3,HGB:3,HCT:3,PLT:3,MCV:3,MCH:3,MCHC:3)@  BMP  @LABRCNTIP(NA:3,K:3,CO2:3,CL:3,BUN:3,Creatinine:3,GLU:3,GFR:3)@    POCT-Glucose  No results found for: "POCTGLUCOSE"    @LABRCNTIP(Calcium:3,MG:3,PHOS:3)@  LFT  @LABRCNTIP(PROT:3,Albumin:3,,Bilitot:3,AST:3,Alkphos:3,ALT:3)@  GFR     COAGS  @LABRCNTIP(PT:3,INR:3,APTT:3)@  CE  @LABRCNTIP(troponini:3,cktotal:3,ckmb:3)@  ABGs  @[id number](PH,PCO2,PO2,HCO3,POCSATURATED,BE)@  BNP  @LABRCNTIP(BNP:3)@    LAST HbA1c  Lab Results   Component Value Date    " HGBA1C 5.4 04/23/2024       Lipid panel  Lab Results   Component Value Date    CHOL 161 04/23/2024    CHOL 141 12/13/2022    CHOL 173 06/07/2022     Lab Results   Component Value Date    HDL 52 04/23/2024    HDL 44 12/13/2022    HDL 39 (L) 06/07/2022     Lab Results   Component Value Date    LDLCALC 91.0 04/23/2024    LDLCALC 86.2 12/13/2022    LDLCALC 96.4 06/07/2022     Lab Results   Component Value Date    TRIG 90 04/23/2024    TRIG 54 12/13/2022    TRIG 188 (H) 06/07/2022     Lab Results   Component Value Date    CHOLHDL 32.3 04/23/2024    CHOLHDL 31.2 12/13/2022    CHOLHDL 22.5 06/07/2022            Review of Systems   All other systems reviewed and are negative.      Objective:   General: No acute stress  HENT: EOM intact, PERRL, oropharynx clear, mucous membranes clear and moist   Pulmonary: CTAB  Cardiovascular: IRIR  Abdomen: soft, non-tender, no distended no splenomegaly or hepatomegaly   Skin: warm, dry, no erythema, no rashes    Extremities: periph pulses intact, no cyanosis or edema   Neuro: a/ox4, clear speech, follows commands, no weakness or focal neurologic  deficit   Psych: mood and behavior normal       Assessment:     1. Atrial fibrillation, unspecified type    2. Left atrial dilation    3. Decreased cardiac ejection fraction    4. Abnormal echocardiogram    5. New onset atrial fibrillation    6. Aortic atherosclerosis        Plan:   Afib--> continue OAC and rate controlled (ECG was 115 bpm but he has been walking- during my PE HR decreased to 70-80s,  usually HR 70s)  HTN- continue current medications  DVT- continue OAC  PVD-CEAP 4-5. Currently using compression stocking asymptomatic. Previously treated with podiatry. Will ordered US for insuffiencey   HLD continue statin   Target BP < 130/80 mmHg    Continue with current medical plan and lifestyle changes.  Compression stocking 20-30 for venous insufficiency if no improvement  Elevate legs while sitting  If there is no improvement she will have  an insufficiency ultrasound for further evaluation     Weight loss  Exercise    Continue with current medical plan and lifestyle changes.  Return sooner for concerns or questions. If symptoms persist go to the ED  I have reviewed all pertinent data on this patient       She expressed verbal understanding and agreed with the plan      Follow up as scheduled. Return sooner for concerns or questions      I have reviewed the patient's medical history in detail and updated the computerized patient record.    No orders of the defined types were placed in this encounter.      Follow up as scheduled. Return sooner for concerns or questions            He expressed verbal understanding and agreed with the plan        Patient's Medications   New Prescriptions    No medications on file   Previous Medications    APIXABAN (ELIQUIS) 5 MG TAB    Take 1 tablet (5 mg total) by mouth 2 (two) times daily.    ASPIRIN 81 MG CAP    Take by mouth.    CYANOCOBALAMIN, VITAMIN B-12, 2,500 MCG TAB    Take 5,000 mcg by mouth once daily.    ERGOCALCIFEROL (ERGOCALCIFEROL) 50,000 UNIT CAP    TAKE 1 CAPSULE BY MOUTH EVERY 7 DAYS. THEN START DAILY OTC REPLACEMENT AFTER THIS RX IS COMPLETE    FLUOROURACIL (EFUDEX) 5 % CREAM    Apply thin film to left lower forearm 2times per day for 3 weeks; d/c if area bleeding or ulcerated; avoid eyes or mouth    GENTAMICIN (GARAMYCIN) 0.1 % OINTMENT    Apply topically.    KETOCONAZOLE (NIZORAL) 2 % CREAM    AAA bid x 3 weeks    LOSARTAN (COZAAR) 100 MG TABLET    Take 1 tablet (100 mg total) by mouth every evening. For blood pressure.    LUBIPROSTONE (AMITIZA) 8 MCG CAP    Take 1 capsule (8 mcg total) by mouth 2 (two) times daily.    ROSUVASTATIN (CRESTOR) 5 MG TABLET    Take 1 tablet (5 mg total) by mouth once daily. For cholesterol    TRIAMCINOLONE ACETONIDE 0.1% (KENALOG) 0.1 % CREAM    Apply topically 2 (two) times daily. X 14 days   Modified Medications    No medications on file   Discontinued Medications     No medications on file        Dima Martin MD  Cardiovascular Disease [facility]

## 2024-05-20 ENCOUNTER — HOSPITAL ENCOUNTER (OUTPATIENT)
Dept: CARDIOLOGY | Facility: HOSPITAL | Age: 64
Discharge: HOME | End: 2024-05-20
Attending: STUDENT IN AN ORGANIZED HEALTH CARE EDUCATION/TRAINING PROGRAM
Payer: COMMERCIAL

## 2024-05-20 DIAGNOSIS — I87.2 VENOUS (PERIPHERAL) INSUFFICIENCY: ICD-10-CM

## 2024-05-20 LAB
LEFT GREAT SAPHENOUS DISTAL THIGH DIA: 0.43 CM
LEFT GREAT SAPHENOUS JUNCTION DIA: 0.19 CM
LEFT GREAT SAPHENOUS KNEE DIA: 0.46 CM
LEFT GREAT SAPHENOUS KNEE REFLUX: 1718 MS
LEFT GREAT SAPHENOUS MIDDLE THIGH DIA: 0.35 CM
LEFT GREAT SAPHENOUS MIDDLE THIGH REFLUX: 576 MS
LEFT GREAT SAPHENOUS PROXIMAL CALF DIA: 0.4 CM
RIGHT GREAT SAPHENOUS DISTAL THIGH DIA: 0.21 CM
RIGHT GREAT SAPHENOUS JUNCTION DIA: 0.48 CM
RIGHT GREAT SAPHENOUS KNEE DIA: 0.39 CM
RIGHT GREAT SAPHENOUS KNEE REFLUX: 3305 MS
RIGHT GREAT SAPHENOUS MIDDLE THIGH DIA: 0.29 CM
RIGHT GREAT SAPHENOUS PROXIMAL CALF DIA: 0.29 CM
RIGHT GREAT SAPHENOUS PROXIMAL CALF REFLUX: 5815 MS
RIGHT SMALL SAPHENOUS KNEE DIA: 0.17 CM

## 2024-05-20 PROCEDURE — 93970 EXTREMITY STUDY: CPT | Mod: 26,,, | Performed by: INTERNAL MEDICINE

## 2024-05-20 PROCEDURE — 93970 EXTREMITY STUDY: CPT | Mod: TC

## 2024-05-21 ENCOUNTER — LAB VISIT (OUTPATIENT)
Dept: LAB | Facility: HOSPITAL | Age: 64
End: 2024-05-21
Attending: INTERNAL MEDICINE
Payer: COMMERCIAL

## 2024-05-21 DIAGNOSIS — I48.91 NEW ONSET ATRIAL FIBRILLATION: ICD-10-CM

## 2024-05-21 LAB
ANION GAP SERPL CALC-SCNC: 10 MMOL/L (ref 8–16)
APTT PPP: 31.8 SEC (ref 21–32)
BUN SERPL-MCNC: 14 MG/DL (ref 8–23)
CALCIUM SERPL-MCNC: 8.8 MG/DL (ref 8.7–10.5)
CHLORIDE SERPL-SCNC: 104 MMOL/L (ref 95–110)
CO2 SERPL-SCNC: 26 MMOL/L (ref 23–29)
CREAT SERPL-MCNC: 0.9 MG/DL (ref 0.5–1.4)
ERYTHROCYTE [DISTWIDTH] IN BLOOD BY AUTOMATED COUNT: 13.8 % (ref 11.5–14.5)
EST. GFR  (NO RACE VARIABLE): >60 ML/MIN/1.73 M^2
GLUCOSE SERPL-MCNC: 105 MG/DL (ref 70–110)
HCT VFR BLD AUTO: 43.3 % (ref 40–54)
HGB BLD-MCNC: 14.2 G/DL (ref 14–18)
INR PPP: 1.1 (ref 0.8–1.2)
MCH RBC QN AUTO: 29.2 PG (ref 27–31)
MCHC RBC AUTO-ENTMCNC: 32.8 G/DL (ref 32–36)
MCV RBC AUTO: 89 FL (ref 82–98)
PLATELET # BLD AUTO: 248 K/UL (ref 150–450)
PMV BLD AUTO: 10.9 FL (ref 9.2–12.9)
POTASSIUM SERPL-SCNC: 3.8 MMOL/L (ref 3.5–5.1)
PROTHROMBIN TIME: 11.8 SEC (ref 9–12.5)
RBC # BLD AUTO: 4.87 M/UL (ref 4.6–6.2)
SODIUM SERPL-SCNC: 140 MMOL/L (ref 136–145)
WBC # BLD AUTO: 6.48 K/UL (ref 3.9–12.7)

## 2024-05-21 PROCEDURE — 36415 COLL VENOUS BLD VENIPUNCTURE: CPT | Performed by: INTERNAL MEDICINE

## 2024-05-21 PROCEDURE — 80048 BASIC METABOLIC PNL TOTAL CA: CPT | Performed by: INTERNAL MEDICINE

## 2024-05-21 PROCEDURE — 85027 COMPLETE CBC AUTOMATED: CPT | Performed by: INTERNAL MEDICINE

## 2024-05-21 PROCEDURE — 85730 THROMBOPLASTIN TIME PARTIAL: CPT | Performed by: INTERNAL MEDICINE

## 2024-05-21 PROCEDURE — 85610 PROTHROMBIN TIME: CPT | Performed by: INTERNAL MEDICINE

## 2024-05-22 ENCOUNTER — TELEPHONE (OUTPATIENT)
Dept: ELECTROPHYSIOLOGY | Facility: CLINIC | Age: 64
End: 2024-05-22
Payer: COMMERCIAL

## 2024-05-22 LAB
OHS QRS DURATION: 98 MS
OHS QTC CALCULATION: 415 MS

## 2024-05-22 NOTE — TELEPHONE ENCOUNTER
Spoke to patient    CONFIRMED procedure arrival time of 7 AM on 5/23/24 for 9 AM procedure    Reiterated instructions including:  -Directions to check in desk  -NPO after midnight night prior to procedure  -Confirmed compliance of maintaining Eliquis as prescribed  -Pre-procedure LABS were reviewed and no alerts noted  -Confirmed absence of implanted device/stimulator   -Confirmed no fever, cough, or shortness of breath in the past 30 days  --Reviewed current visitor policy    Patient verbalized understanding of above and appreciated the call.

## 2024-05-23 ENCOUNTER — HOSPITAL ENCOUNTER (OUTPATIENT)
Dept: CARDIOLOGY | Facility: HOSPITAL | Age: 64
Discharge: HOME | End: 2024-05-23
Attending: INTERNAL MEDICINE
Payer: COMMERCIAL

## 2024-05-23 ENCOUNTER — ANESTHESIA EVENT (OUTPATIENT)
Dept: MEDSURG UNIT | Facility: HOSPITAL | Age: 64
End: 2024-05-23
Payer: COMMERCIAL

## 2024-05-23 ENCOUNTER — ANESTHESIA (OUTPATIENT)
Dept: MEDSURG UNIT | Facility: HOSPITAL | Age: 64
End: 2024-05-23
Payer: COMMERCIAL

## 2024-05-23 ENCOUNTER — HOSPITAL ENCOUNTER (OUTPATIENT)
Facility: HOSPITAL | Age: 64
Discharge: HOME | End: 2024-05-23
Attending: INTERNAL MEDICINE | Admitting: INTERNAL MEDICINE
Payer: COMMERCIAL

## 2024-05-23 VITALS
HEIGHT: 78 IN | SYSTOLIC BLOOD PRESSURE: 152 MMHG | WEIGHT: 240 LBS | DIASTOLIC BLOOD PRESSURE: 109 MMHG | BODY MASS INDEX: 27.77 KG/M2

## 2024-05-23 VITALS
TEMPERATURE: 98 F | OXYGEN SATURATION: 96 % | RESPIRATION RATE: 20 BRPM | DIASTOLIC BLOOD PRESSURE: 108 MMHG | HEIGHT: 78 IN | SYSTOLIC BLOOD PRESSURE: 163 MMHG | BODY MASS INDEX: 27.77 KG/M2 | HEART RATE: 69 BPM | WEIGHT: 240 LBS

## 2024-05-23 DIAGNOSIS — I48.91 ATRIAL FIBRILLATION: ICD-10-CM

## 2024-05-23 DIAGNOSIS — I48.91 NEW ONSET ATRIAL FIBRILLATION: ICD-10-CM

## 2024-05-23 LAB
ASCENDING AORTA: 4.1 CM
BSA FOR ECHO PROCEDURE: 2.46 M2
OHS QRS DURATION: 102 MS
OHS QRS DURATION: 92 MS
OHS QTC CALCULATION: 450 MS
OHS QTC CALCULATION: 469 MS
POCT GLUCOSE: 95 MG/DL (ref 70–110)
SINUS: 3.4 CM
STJ: 3 CM

## 2024-05-23 PROCEDURE — 92960 CARDIOVERSION ELECTRIC EXT: CPT | Mod: ,,, | Performed by: INTERNAL MEDICINE

## 2024-05-23 PROCEDURE — D9220A PRA ANESTHESIA: Mod: ANES,,, | Performed by: ANESTHESIOLOGY

## 2024-05-23 PROCEDURE — 92960 CARDIOVERSION ELECTRIC EXT: CPT | Performed by: INTERNAL MEDICINE

## 2024-05-23 PROCEDURE — D9220A PRA ANESTHESIA: Mod: CRNA,,, | Performed by: NURSE ANESTHETIST, CERTIFIED REGISTERED

## 2024-05-23 PROCEDURE — 93010 ELECTROCARDIOGRAM REPORT: CPT | Mod: 76,,, | Performed by: INTERNAL MEDICINE

## 2024-05-23 PROCEDURE — 93005 ELECTROCARDIOGRAM TRACING: CPT | Mod: 59

## 2024-05-23 PROCEDURE — 93325 DOPPLER ECHO COLOR FLOW MAPG: CPT

## 2024-05-23 PROCEDURE — 93325 DOPPLER ECHO COLOR FLOW MAPG: CPT | Mod: 26,,, | Performed by: INTERNAL MEDICINE

## 2024-05-23 PROCEDURE — 37000009 HC ANESTHESIA EA ADD 15 MINS: Performed by: INTERNAL MEDICINE

## 2024-05-23 PROCEDURE — 93005 ELECTROCARDIOGRAM TRACING: CPT

## 2024-05-23 PROCEDURE — 93320 DOPPLER ECHO COMPLETE: CPT | Mod: 26,,, | Performed by: INTERNAL MEDICINE

## 2024-05-23 PROCEDURE — 25000003 PHARM REV CODE 250: Performed by: NURSE ANESTHETIST, CERTIFIED REGISTERED

## 2024-05-23 PROCEDURE — 93010 ELECTROCARDIOGRAM REPORT: CPT | Mod: ,,, | Performed by: INTERNAL MEDICINE

## 2024-05-23 PROCEDURE — 93312 ECHO TRANSESOPHAGEAL: CPT | Mod: 26,,, | Performed by: INTERNAL MEDICINE

## 2024-05-23 PROCEDURE — 37000008 HC ANESTHESIA 1ST 15 MINUTES: Performed by: INTERNAL MEDICINE

## 2024-05-23 PROCEDURE — 63600175 PHARM REV CODE 636 W HCPCS: Performed by: NURSE ANESTHETIST, CERTIFIED REGISTERED

## 2024-05-23 PROCEDURE — 25000003 PHARM REV CODE 250: Performed by: PHYSICIAN ASSISTANT

## 2024-05-23 RX ORDER — PROPOFOL 10 MG/ML
VIAL (ML) INTRAVENOUS CONTINUOUS PRN
Status: DISCONTINUED | OUTPATIENT
Start: 2024-05-23 | End: 2024-05-23

## 2024-05-23 RX ORDER — HYDROMORPHONE HYDROCHLORIDE 1 MG/ML
0.2 INJECTION, SOLUTION INTRAMUSCULAR; INTRAVENOUS; SUBCUTANEOUS EVERY 5 MIN PRN
Status: DISCONTINUED | OUTPATIENT
Start: 2024-05-23 | End: 2024-05-23 | Stop reason: HOSPADM

## 2024-05-23 RX ORDER — AMIODARONE HYDROCHLORIDE 200 MG/1
TABLET ORAL
Qty: 70 TABLET | Refills: 2 | Status: SHIPPED | OUTPATIENT
Start: 2024-05-23

## 2024-05-23 RX ORDER — SILVER SULFADIAZINE 10 G/1000G
CREAM TOPICAL DAILY
Status: DISCONTINUED | OUTPATIENT
Start: 2024-05-23 | End: 2024-05-23 | Stop reason: HOSPADM

## 2024-05-23 RX ORDER — LIDOCAINE HYDROCHLORIDE 20 MG/ML
INJECTION INTRAVENOUS
Status: DISCONTINUED | OUTPATIENT
Start: 2024-05-23 | End: 2024-05-23

## 2024-05-23 RX ORDER — DROPERIDOL 2.5 MG/ML
0.62 INJECTION, SOLUTION INTRAMUSCULAR; INTRAVENOUS ONCE AS NEEDED
Status: DISCONTINUED | OUTPATIENT
Start: 2024-05-23 | End: 2024-05-23 | Stop reason: HOSPADM

## 2024-05-23 RX ORDER — ETOMIDATE 2 MG/ML
INJECTION INTRAVENOUS
Status: DISCONTINUED | OUTPATIENT
Start: 2024-05-23 | End: 2024-05-23

## 2024-05-23 RX ORDER — ONDANSETRON HYDROCHLORIDE 2 MG/ML
4 INJECTION, SOLUTION INTRAVENOUS ONCE AS NEEDED
Status: DISCONTINUED | OUTPATIENT
Start: 2024-05-23 | End: 2024-05-23 | Stop reason: HOSPADM

## 2024-05-23 RX ADMIN — LIDOCAINE HYDROCHLORIDE 100 MG: 20 INJECTION INTRAVENOUS at 09:05

## 2024-05-23 RX ADMIN — SILVER SULFADIAZINE: 10 CREAM TOPICAL at 10:05

## 2024-05-23 RX ADMIN — SODIUM CHLORIDE: 0.9 INJECTION, SOLUTION INTRAVENOUS at 09:05

## 2024-05-23 RX ADMIN — ETOMIDATE 6 MG: 2 INJECTION, SOLUTION INTRAVENOUS at 09:05

## 2024-05-23 RX ADMIN — ETOMIDATE 4 MG: 2 INJECTION, SOLUTION INTRAVENOUS at 09:05

## 2024-05-23 RX ADMIN — PROPOFOL 100 MCG/KG/MIN: 10 INJECTION, EMULSION INTRAVENOUS at 09:05

## 2024-05-23 NOTE — PLAN OF CARE
"B/p elevated in the 160S/low 100"s (see vs charting). Pt is asymptomatic and appears to be the patient's baseline pre procedure. Pt states he has not taken any of his meds in about a month due to stomach issues. CHOCO Munguia on unit and notified. No new orders given at this time. Will monitor  "

## 2024-05-23 NOTE — SUBJECTIVE & OBJECTIVE
Past Medical History:   Diagnosis Date    Blood clot in vein     Right Leg, 2009    Diabetes mellitus     Diabetes mellitus, type 2     Gout flare     Hyperlipemia     Prostate atrophy     Varicose veins        Past Surgical History:   Procedure Laterality Date    COLONOSCOPY N/A 10/13/2022    Procedure: COLONOSCOPY Ace;  Surgeon: Sacha Mead MD;  Location: [facility];  Service: Endoscopy;  Laterality: N/A;    VARICOSE VEIN SURGERY         Review of patient's allergies indicates:  No Known Allergies    No current facility-administered medications on file prior to encounter.     Current Outpatient Medications on File Prior to Encounter   Medication Sig    apixaban (ELIQUIS) 5 mg Tab Take 1 tablet (5 mg total) by mouth 2 (two) times daily.    aspirin 81 mg Cap Take by mouth.    cyanocobalamin, vitamin B-12, 2,500 mcg Tab Take 5,000 mcg by mouth once daily.    ergocalciferol (ERGOCALCIFEROL) 50,000 unit Cap TAKE 1 CAPSULE BY MOUTH EVERY 7 DAYS. THEN START DAILY OTC REPLACEMENT AFTER THIS RX IS COMPLETE    fluorouraciL (EFUDEX) 5 % cream Apply thin film to left lower forearm 2times per day for 3 weeks; d/c if area bleeding or ulcerated; avoid eyes or mouth    gentamicin (GARAMYCIN) 0.1 % ointment Apply topically.    ketoconazole (NIZORAL) 2 % cream AAA bid x 3 weeks    losartan (COZAAR) 100 MG tablet Take 1 tablet (100 mg total) by mouth every evening. For blood pressure.    lubiprostone (AMITIZA) 8 MCG Cap Take 1 capsule (8 mcg total) by mouth 2 (two) times daily.    rosuvastatin (CRESTOR) 5 MG tablet Take 1 tablet (5 mg total) by mouth once daily. For cholesterol    triamcinolone acetonide 0.1% (KENALOG) 0.1 % cream Apply topically 2 (two) times daily. X 14 days     Family History       Problem Relation (Age of Onset)    Diabetes Father, Sister          Tobacco Use    Smoking status: Former     Current packs/day: 0.00     Types: Cigarettes     Start date: 2002     Quit date: 2008     Years since quitting:  16.4    Smokeless tobacco: Never   Substance and Sexual Activity    Alcohol use: Yes     Alcohol/week: 4.0 standard drinks of alcohol     Types: 4 Cans of beer per week     Comment: drinks beer, 2 cans, about 2x/week    Drug use: No    Sexual activity: Not Currently     Review of Systems   Constitutional: Negative for diaphoresis, malaise/fatigue, weight gain and weight loss.   HENT:  Negative for nosebleeds.    Eyes:  Negative for vision loss in left eye, vision loss in right eye and visual disturbance.   Cardiovascular:  Negative for chest pain, claudication, dyspnea on exertion, irregular heartbeat, leg swelling, near-syncope, orthopnea, palpitations, paroxysmal nocturnal dyspnea and syncope.   Respiratory:  Negative for cough, shortness of breath, sleep disturbances due to breathing, snoring and wheezing.    Hematologic/Lymphatic: Negative for bleeding problem. Does not bruise/bleed easily.   Skin:  Negative for poor wound healing and rash.   Musculoskeletal:  Negative for muscle cramps and myalgias.   Gastrointestinal:  Negative for bloating, abdominal pain, diarrhea, heartburn, melena, nausea and vomiting.   Genitourinary:  Negative for hematuria and nocturia.   Neurological:  Negative for brief paralysis, dizziness, headaches, light-headedness, numbness and weakness.   Psychiatric/Behavioral:  Negative for depression.    Allergic/Immunologic: Negative for hives.     Objective:     Vital Signs (Most Recent):  Temp: 98.2 °F (36.8 °C) (05/23/24 0803)  Pulse: 82 (05/23/24 0803)  Resp: 18 (05/23/24 0803)  BP: (!) 152/109 (05/23/24 0806)  SpO2: 95 % (05/23/24 0803) Vital Signs (24h Range):  Temp:  [98.2 °F (36.8 °C)] 98.2 °F (36.8 °C)  Pulse:  [82] 82  Resp:  [18] 18  SpO2:  [95 %] 95 %  BP: (149-152)/(103-109) 152/109     Weight: 108.9 kg (240 lb)  Body mass index is 27.04 kg/m².    SpO2: 95 %       No intake or output data in the 24 hours ending 05/23/24 0828    Lines/Drains/Airways       None                     Physical Exam  Vitals and nursing note reviewed.   Constitutional:       Appearance: He is well-developed. He is not diaphoretic.   HENT:      Head: Normocephalic and atraumatic.   Eyes:      Conjunctiva/sclera: Conjunctivae normal.   Neck:      Vascular: No carotid bruit or JVD.   Cardiovascular:      Rate and Rhythm: Normal rate. Rhythm irregular.      Pulses: Normal pulses and intact distal pulses.      Heart sounds: Normal heart sounds. No murmur heard.     No friction rub. No gallop.   Pulmonary:      Effort: Pulmonary effort is normal.      Breath sounds: Normal breath sounds. No rales.   Chest:      Chest wall: No tenderness.   Abdominal:      General: There is no distension.      Palpations: Abdomen is soft. There is no mass.      Tenderness: There is no abdominal tenderness.   Skin:     General: Skin is warm and dry.      Coloration: Skin is not pale.   Neurological:      Mental Status: He is alert and oriented to person, place, and time.          Significant Labs: All pertinent lab results from the last 24 hours have been reviewed.

## 2024-05-23 NOTE — H&P
[facility] Card[facility]  Cardiology  History and Physical     Patient Name: Kevan Norris  MRN: [id number]  Admission Date: 5/23/2024  Code Status: Prior   Attending Provider: Sanchez Boucher MD   Primary Care Physician: Jose A Ott DO  Principal Problem:<principal problem not specified>    Patient information was obtained from patient and past medical records.     Subjective:     Chief Complaint:  Atrial fibrillation      HPI:  Mr. Norris is a 63 year-old man with hypertension, aortic atherosclerosis observed on prior CT scan, prior DVT treated with coumadin, microscopic hematuria, and IBS. He presented recently to Dr. Ott for annual follow-up. He was observed to be in atrial fibrillation. Eliquis was started. He has no obvious symptoms. He underwent TTE showing reduced LVEF of 30-35%. He presents today for NUVIA and DCCV.     TTE 4/30/2024    Left Ventricle: The left ventricle is mildly dilated. There is concentric hypertrophy. Global hypokinesis present. There is moderately reduced systolic function with a visually estimated ejection fraction of 30 - 35%. Biplane (2D) method of discs ejection fraction is 32%. Unable to assess diastolic function due to atrial fibrillation.    Right Ventricle: Normal right ventricular cavity size. Systolic function is reduced.TAPSE is 1.23 cm.    Left Atrium: Left atrium is severely dilated. The left atrium volume index is 56.2 mL/m2. Patent foramen ovale visualized.    Right Atrium: Right atrium is moderate to severe dilated.    Mitral Valve: There is mild to moderate regurgitation.    Pulmonary Artery: The estimated pulmonary artery systolic pressure is 34 mmHg.    IVC/SVC: Intermediate venous pressure at 8 mmHg.    Aortic root is measuring 3.69 cm. Ascending aorta is measuring 3.85 cm.    Anticoagulant/antiplatelets: Eliquis 5 mg bid   ECG: Atrial fibrillation    Dysphagia or odynophagia:  No  Liver Disease, esophageal disease, or known varices:  No  Upper  GI Bleeding: No  Snoring:  Yes  Sleep Apnea:   Not diagnosed  Prior neck surgery or radiation:  No  Able to move neck in all directions:  Yes  History of anesthetic difficulties:  No  Family history of anesthetic difficulties:  No  Last oral intake: yesterday before midnight  GLP-1 use: No    Platelet count: 248k  INR: 1.1      Past Medical History:   Diagnosis Date    Blood clot in vein     Right Leg, 2009    Diabetes mellitus     Diabetes mellitus, type 2     Gout flare     Hyperlipemia     Prostate atrophy     Varicose veins        Past Surgical History:   Procedure Laterality Date    COLONOSCOPY N/A 10/13/2022    Procedure: COLONOSCOPY [place];  Surgeon: Sacha Mead MD;  Location: [facility];  Service: Endoscopy;  Laterality: N/A;    VARICOSE VEIN SURGERY         Review of patient's allergies indicates:  No Known Allergies    No current facility-administered medications on file prior to encounter.     Current Outpatient Medications on File Prior to Encounter   Medication Sig    apixaban (ELIQUIS) 5 mg Tab Take 1 tablet (5 mg total) by mouth 2 (two) times daily.    aspirin 81 mg Cap Take by mouth.    cyanocobalamin, vitamin B-12, 2,500 mcg Tab Take 5,000 mcg by mouth once daily.    ergocalciferol (ERGOCALCIFEROL) 50,000 unit Cap TAKE 1 CAPSULE BY MOUTH EVERY 7 DAYS. THEN START DAILY OTC REPLACEMENT AFTER THIS RX IS COMPLETE    fluorouraciL (EFUDEX) 5 % cream Apply thin film to left lower forearm 2times per day for 3 weeks; d/c if area bleeding or ulcerated; avoid eyes or mouth    gentamicin (GARAMYCIN) 0.1 % ointment Apply topically.    ketoconazole (NIZORAL) 2 % cream AAA bid x 3 weeks    losartan (COZAAR) 100 MG tablet Take 1 tablet (100 mg total) by mouth every evening. For blood pressure.    lubiprostone (AMITIZA) 8 MCG Cap Take 1 capsule (8 mcg total) by mouth 2 (two) times daily.    rosuvastatin (CRESTOR) 5 MG tablet Take 1 tablet (5 mg total) by mouth once daily. For cholesterol    triamcinolone  acetonide 0.1% (KENALOG) 0.1 % cream Apply topically 2 (two) times daily. X 14 days     Family History       Problem Relation (Age of Onset)    Diabetes Father, Sister          Tobacco Use    Smoking status: Former     Current packs/day: 0.00     Types: Cigarettes     Start date:      Quit date:      Years since quittin.4    Smokeless tobacco: Never   Substance and Sexual Activity    Alcohol use: Yes     Alcohol/week: 4.0 standard drinks of alcohol     Types: 4 Cans of beer per week     Comment: drinks beer, 2 cans, about 2x/week    Drug use: No    Sexual activity: Not Currently     Review of Systems   Constitutional: Negative for diaphoresis, malaise/fatigue, weight gain and weight loss.   HENT:  Negative for nosebleeds.    Eyes:  Negative for vision loss in left eye, vision loss in right eye and visual disturbance.   Cardiovascular:  Negative for chest pain, claudication, dyspnea on exertion, irregular heartbeat, leg swelling, near-syncope, orthopnea, palpitations, paroxysmal nocturnal dyspnea and syncope.   Respiratory:  Negative for cough, shortness of breath, sleep disturbances due to breathing, snoring and wheezing.    Hematologic/Lymphatic: Negative for bleeding problem. Does not bruise/bleed easily.   Skin:  Negative for poor wound healing and rash.   Musculoskeletal:  Negative for muscle cramps and myalgias.   Gastrointestinal:  Negative for bloating, abdominal pain, diarrhea, heartburn, melena, nausea and vomiting.   Genitourinary:  Negative for hematuria and nocturia.   Neurological:  Negative for brief paralysis, dizziness, headaches, light-headedness, numbness and weakness.   Psychiatric/Behavioral:  Negative for depression.    Allergic/Immunologic: Negative for hives.     Objective:     Vital Signs (Most Recent):  Temp: 98.2 °F (36.8 °C) (24)  Pulse: 82 (24)  Resp: 18 (24)  BP: (!) 152/109 (24)  SpO2: 95 % (24) Vital Signs (24h  Range):  Temp:  [98.2 °F (36.8 °C)] 98.2 °F (36.8 °C)  Pulse:  [82] 82  Resp:  [18] 18  SpO2:  [95 %] 95 %  BP: (149-152)/(103-109) 152/109     Weight: 108.9 kg (240 lb)  Body mass index is 27.04 kg/m².    SpO2: 95 %       No intake or output data in the 24 hours ending 05/23/24 0828    Lines/Drains/Airways       None                    Physical Exam  Vitals and nursing note reviewed.   Constitutional:       Appearance: He is well-developed. He is not diaphoretic.   HENT:      Head: Normocephalic and atraumatic.   Eyes:      Conjunctiva/sclera: Conjunctivae normal.   Neck:      Vascular: No carotid bruit or JVD.   Cardiovascular:      Rate and Rhythm: Normal rate. Rhythm irregular.      Pulses: Normal pulses and intact distal pulses.      Heart sounds: Normal heart sounds. No murmur heard.     No friction rub. No gallop.   Pulmonary:      Effort: Pulmonary effort is normal.      Breath sounds: Normal breath sounds. No rales.   Chest:      Chest wall: No tenderness.   Abdominal:      General: There is no distension.      Palpations: Abdomen is soft. There is no mass.      Tenderness: There is no abdominal tenderness.   Skin:     General: Skin is warm and dry.      Coloration: Skin is not pale.   Neurological:      Mental Status: He is alert and oriented to person, place, and time.          Significant Labs: All pertinent lab results from the last 24 hours have been reviewed.    Assessment and Plan:     New onset atrial fibrillation  Here today for NUVIA and DCCV. He will likely be started on Amiodarone afterwards.   -The risks, benefits & alternatives of the procedure were explained to the patient.    -The risks of transesophageal echo include but are not limited to:  Dental trauma, esophageal trauma/perforation, bleeding, laryngospasm/brochospasm, aspiration, sore throat/hoarseness, & dislodgement of the endotracheal tube/nasogastric tube (where applicable).    -The risks of moderate sedation include hypotension,  respiratory depression, arrhythmias, bronchospasm, & death.    -Prior to procedure, extensive discussion with patient regarding risks and benefits of DCCV at bedside today.   -The patient voices understanding, all questions have been answered, and patient would like to proceed.   -Informed consent was obtained & the patient is agreeable to proceed with the procedure.      Further recommendations per attending addendum               VTE Risk Mitigation (From admission, onward)      None            Lana Carrillo PA-C  Cardiology   [facility] Card[facility]

## 2024-05-23 NOTE — DISCHARGE SUMMARY
[facility]  Cardiology  Discharge Summary      Patient Name: Kevan Norris  MRN: [id number]  Admission Date: 5/23/2024  Hospital Length of Stay: 0 days  Discharge Date and Time:  05/23/2024 10:14 AM  Attending Physician: Sanchez Boucher MD    Discharging Provider: Lana Carrillo PA-C  Primary Care Physician: Jose A Ott DO    HPI:   Mr. Norris is a 63 year-old man with hypertension, aortic atherosclerosis observed on prior CT scan, prior DVT treated with coumadin, microscopic hematuria, and IBS. He presented recently to Dr. Ott for annual follow-up. He was observed to be in atrial fibrillation. Eliquis was started. He has no obvious symptoms. He underwent TTE showing reduced LVEF of 30-35%. He presents today for NUVIA and DCCV.     TTE 4/30/2024    Left Ventricle: The left ventricle is mildly dilated. There is concentric hypertrophy. Global hypokinesis present. There is moderately reduced systolic function with a visually estimated ejection fraction of 30 - 35%. Biplane (2D) method of discs ejection fraction is 32%. Unable to assess diastolic function due to atrial fibrillation.    Right Ventricle: Normal right ventricular cavity size. Systolic function is reduced.TAPSE is 1.23 cm.    Left Atrium: Left atrium is severely dilated. The left atrium volume index is 56.2 mL/m2. Patent foramen ovale visualized.    Right Atrium: Right atrium is moderate to severe dilated.    Mitral Valve: There is mild to moderate regurgitation.    Pulmonary Artery: The estimated pulmonary artery systolic pressure is 34 mmHg.    IVC/SVC: Intermediate venous pressure at 8 mmHg.    Aortic root is measuring 3.69 cm. Ascending aorta is measuring 3.85 cm.    Anticoagulant/antiplatelets: Eliquis 5 mg bid   ECG: Atrial fibrillation    Dysphagia or odynophagia:  No  Liver Disease, esophageal disease, or known varices:  No  Upper GI Bleeding: No  Snoring:  Yes  Sleep Apnea:   Not diagnosed  Prior neck surgery or  radiation:  No  Able to move neck in all directions:  Yes  History of anesthetic difficulties:  No  Family history of anesthetic difficulties:  No  Last oral intake: yesterday before midnight  GLP-1 use: No    Platelet count: 248k  INR: 1.1      Procedure(s) (LRB):  Cardioversion or Defibrillation (N/A)  Transesophageal echo (NUVIA) intra-procedure log documentation (N/A)     Indwelling Lines/Drains at time of discharge:  Lines/Drains/Airways       None                   Hospital Course:  Patient underwent NUVIA without evidence of JUNIOR thrombus. Proceeded with DCCV, converting from atrial fibrillation to sinus rhythm. Patient tolerated the procedure without any acute complications. Post-DCCV ECG revealed NSR at 78 bpm. Plan to continue all home medications including Eliquis 5 mg bid; start Amiodarone 400 mg bid x 14 days then decrease to 200 mg daily. Instructed to return in 1 week for follow up ECG and in 4 weeks for clinic follow up with Dr. Boucher  Patient was assessed at bedside prior to discharge, they reported feeling well and denied chest discomfort, shortness of breath, palpitations, lightheadedness, or any other acute symptoms. Discharge instructions were discussed with patient and all questions were answered. Patient was discharged home in stable condition.      Goals of Care Treatment Preferences:  Code Status: Full Code      Significant Diagnostic Studies: NUVIA - final report pending - prelim no JUNIOR thrombus, proceeded with DCCV     Pending Diagnostic Studies:       None            Final Active Diagnoses:    Diagnosis Date Noted POA    New onset atrial fibrillation [I48.91] 04/25/2024 Yes      Problems Resolved During this Admission:     No new Assessment & Plan notes have been filed under this hospital service since the last note was generated.  Service: Cardiology      Discharged Condition: stable    Disposition: Home or Self Care    Follow Up:   Follow-up Information       Sanchez Boucher MD. Go on  6/4/2024.    Specialties: Electrophysiology, Cardiology  Contact information:  [address]  [facility] [address]  [phone]               [facility] At[facility] 3rd [address]. Go on 5/30/2024.    Specialty: Cardiology  Why: Go to your EKG appointment in one week  Contact information:  [address]  [facility] [address] [phone]  Additional information:  [facility] - Main Building, Atrium 3rd Floor   Please park in [facility] and use Atrium elevator                         Patient Instructions:   No discharge procedures on file.  Medications:  Reconciled Home Medications:      Medication List        START taking these medications      amiodarone 200 MG Tab  Commonly known as: PACERONE  Take 400 mg (2 tabs) TWICE daily for 14 days and then decrease to 200 mg (1 tab) ONCE daily            CONTINUE taking these medications      apixaban 5 mg Tab  Commonly known as: ELIQUIS  Take 1 tablet (5 mg total) by mouth 2 (two) times daily.     aspirin 81 mg Cap  Take by mouth.     cyanocobalamin (vitamin B-12) 2,500 mcg Tab  Take 5,000 mcg by mouth once daily.     ergocalciferol 50,000 unit Cap  Commonly known as: ERGOCALCIFEROL  TAKE 1 CAPSULE BY MOUTH EVERY 7 DAYS. THEN START DAILY OTC REPLACEMENT AFTER THIS RX IS COMPLETE     fluorouraciL 5 % cream  Commonly known as: EFUDEX  Apply thin film to left lower forearm 2times per day for 3 weeks; d/c if area bleeding or ulcerated; avoid eyes or mouth     gentamicin 0.1 % ointment  Commonly known as: GARAMYCIN  Apply topically.     ketoconazole 2 % cream  Commonly known as: NIZORAL  AAA bid x 3 weeks     losartan 100 MG tablet  Commonly known as: COZAAR  Take 1 tablet (100 mg total) by mouth every evening. For blood pressure.     lubiprostone 8 MCG Cap  Commonly known as: AMITIZA  Take 1 capsule (8 mcg total) by mouth 2 (two) times daily.     rosuvastatin 5 MG tablet  Commonly known as: CRESTOR  Take 1 tablet (5 mg total) by mouth once  daily. For cholesterol     triamcinolone acetonide 0.1% 0.1 % cream  Commonly known as: KENALOG  Apply topically 2 (two) times daily. X 14 days              Time spent on the discharge of patient: 35 minutes    Lana Carrillo PA-C  Cardiology  [facility]

## 2024-05-23 NOTE — ASSESSMENT & PLAN NOTE
Here today for NUVIA and DCCV. He will likely be started on Amiodarone afterwards.   -The risks, benefits & alternatives of the procedure were explained to the patient.    -The risks of transesophageal echo include but are not limited to:  Dental trauma, esophageal trauma/perforation, bleeding, laryngospasm/brochospasm, aspiration, sore throat/hoarseness, & dislodgement of the endotracheal tube/nasogastric tube (where applicable).    -The risks of moderate sedation include hypotension, respiratory depression, arrhythmias, bronchospasm, & death.    -Prior to procedure, extensive discussion with patient regarding risks and benefits of DCCV at bedside today.   -The patient voices understanding, all questions have been answered, and patient would like to proceed.   -Informed consent was obtained & the patient is agreeable to proceed with the procedure.      Further recommendations per attending addendum

## 2024-05-23 NOTE — HOSPITAL COURSE
Patient underwent NUVIA without evidence of JUNIOR thrombus. Proceeded with DCCV, converting from atrial fibrillation to sinus rhythm. Patient tolerated the procedure without any acute complications. Post-DCCV ECG revealed NSR at 78 bpm. Plan to continue all home medications including Eliquis 5 mg bid; start Amiodarone 400 mg bid x 14 days then decrease to 200 mg daily. Instructed to return in 1 week for follow up ECG and in 4 weeks for clinic follow up with Dr. Boucher  Patient was assessed at bedside prior to discharge, they reported feeling well and denied chest discomfort, shortness of breath, palpitations, lightheadedness, or any other acute symptoms. Discharge instructions were discussed with patient and all questions were answered. Patient was discharged home in stable condition.

## 2024-05-23 NOTE — DISCHARGE INSTRUCTIONS
Medications:  -Continue to take your home medications as listed on your medication list after you are discharged.    New Medications:  Start Amiodarone 400 mg twice daily for two weeks and then decrease to 200 mg once daily     Diet  -You may resume oral intake after you are discharged, as long you have no swallowing difficulties.    Because you have received sedation for this procedure:  -Limit activity for the remainder of the day.  -Do not smoke for at least 6 hours and until you are fully awake and alert.  -Do not drink alcoholic beverage for 24 hours.  -Do not drive for 24 hours.  -Defer important decision making until the following day.     Go to the Emergency Department if you develop:   -Bleeding  -Weakness or numbness  -Visual, gait or speech disturbance  -New chest pain, palpitations, shortness of breath, rapid heart beat, or fainting  -Fever    Follow up:  -EKG in 1 week.  -Dr. Boucher as scheduled on 6/4/24      Any need to reschedule or cancel procedures, or any questions regarding your procedures should be addressed directly with the Arrhythmia Department Nurses at the following phone number: [phone].

## 2024-05-23 NOTE — ANESTHESIA PREPROCEDURE EVALUATION
05/23/2024  Kevan Norris is a 63 y.o., male.      Pre-op Assessment          Review of Systems  Anesthesia Hx:  No problems with previous Anesthesia                Social:  Former Smoker       Cardiovascular:  Exercise tolerance: poor   Hypertension    Denies CAD.    Dysrhythmias atrial fibrillation       OTERO                            Pulmonary:     Denies Asthma.     Denies Sleep Apnea.                Renal/:   Denies Chronic Renal Disease.                Hepatic/GI:   Denies PUD. Hiatal Hernia, GERD Denies Liver Disease.            Neurological:    Denies CVA.    Denies Seizures.                                Endocrine:  Denies Diabetes. Denies Hypothyroidism.              Physical Exam  General: Alert    Airway:  Mallampati: I   Mouth Opening: Normal  TM Distance: Normal  Tongue: Normal  Neck ROM: Normal ROM    Dental:  Periodontal disease        Anesthesia Plan  Type of Anesthesia, risks & benefits discussed:    Anesthesia Type: Gen Natural Airway, MAC  Intra-op Monitoring Plan: Standard ASA Monitors  Post Op Pain Control Plan: multimodal analgesia and IV/PO Opioids PRN  Induction:  IV  Airway Plan: Direct  Informed Consent: Informed consent signed with the Patient and all parties understand the risks and agree with anesthesia plan.  All questions answered.   ASA Score: 3    Ready For Surgery From Anesthesia Perspective.     .

## 2024-05-23 NOTE — PLAN OF CARE
Patient discharged per MD orders. Patient educated in the importance of  being compliant with his b/p meds and verbalized understanding and stated he will resume his meds today.  Instructions given on medications, skin care, activity,  when to call MD, and follow up appointments. Pt verbalized understanding.  Patient AAOx4, VSS, no c/o pain or discomfort at this time. Pt ambulated without difficulty and tolerating clears. Telemetry and PIV removed. Patient left unit via wheelchair with his sister.

## 2024-05-23 NOTE — TRANSFER OF CARE
"Anesthesia Transfer of Care Note    Patient: Kevan Norris    Procedure(s) Performed: Procedure(s) (LRB):  Cardioversion or Defibrillation (N/A)  Transesophageal echo (NUVIA) intra-procedure log documentation (N/A)    Patient location: PACU    Anesthesia Type: general    Transport from OR: Transported from OR on 6-10 L/min O2 by face mask with adequate spontaneous ventilation    Post pain: adequate analgesia    Post assessment: no apparent anesthetic complications    Post vital signs: stable    Level of consciousness: awake    Nausea/Vomiting: no nausea/vomiting    Complications: none    Transfer of care protocol was followed      Last vitals: Visit Vitals  BP (!) 152/109 (BP Location: Left arm, Patient Position: Lying)   Pulse 82   Temp 36.8 °C (98.2 °F) (Temporal)   Resp 18   Ht 6' 7" (2.007 m)   Wt 108.9 kg (240 lb)   SpO2 95%   BMI 27.04 kg/m²     "

## 2024-05-23 NOTE — PLAN OF CARE
Received report from Lauro JURADO EP and CRNA. Pt is s/p NUVIA/Cardioversion. Pt is aaox4. Vss. Resp even and unlabored on 6 Liters Simple Face mask. Post procedure protocol reveiwed with patient . Understanding verbalized.  RN at bedside. Will monitor

## 2024-05-24 NOTE — ANESTHESIA POSTPROCEDURE EVALUATION
Anesthesia Post Evaluation    Patient: Kevan Norris    Procedure(s) Performed: Procedure(s) (LRB):  Cardioversion or Defibrillation (N/A)  Transesophageal echo (NUVIA) intra-procedure log documentation (N/A)    Final Anesthesia Type: general      Patient location during evaluation: PACU  Patient participation: Yes- Able to Participate  Level of consciousness: awake  Post-procedure vital signs: reviewed and stable  Pain management: adequate  Airway patency: patent    PONV status at discharge: No PONV  Anesthetic complications: no      Cardiovascular status: blood pressure returned to baseline  Respiratory status: unassisted  Hydration status: euvolemic  Follow-up not needed.              Vitals Value Taken Time   /108 05/23/24 1040   Temp 36.6 °C (97.9 °F) 05/23/24 0954   Pulse 63 05/23/24 1042   Resp 18 05/23/24 1041   SpO2 91 % 05/23/24 1042   Vitals shown include unfiled device data.      No case tracking events are documented in the log.      Pain/Laurent Score: Laurent Score: 10 (5/23/2024 10:05 AM)

## 2024-05-29 ENCOUNTER — TELEPHONE (OUTPATIENT)
Dept: ELECTROPHYSIOLOGY | Facility: CLINIC | Age: 64
End: 2024-05-29
Payer: COMMERCIAL

## 2024-05-29 DIAGNOSIS — I48.91 NEW ONSET ATRIAL FIBRILLATION: Primary | ICD-10-CM

## 2024-05-30 ENCOUNTER — HOSPITAL ENCOUNTER (OUTPATIENT)
Dept: CARDIOLOGY | Facility: CLINIC | Age: 64
Discharge: HOME | End: 2024-05-30
Payer: COMMERCIAL

## 2024-05-30 DIAGNOSIS — I48.91 NEW ONSET ATRIAL FIBRILLATION: ICD-10-CM

## 2024-05-30 LAB
OHS QRS DURATION: 100 MS
OHS QTC CALCULATION: 465 MS

## 2024-05-30 PROCEDURE — 93010 ELECTROCARDIOGRAM REPORT: CPT | Mod: S$GLB,,, | Performed by: INTERNAL MEDICINE

## 2024-05-30 PROCEDURE — 93005 ELECTROCARDIOGRAM TRACING: CPT | Mod: S$GLB,,, | Performed by: INTERNAL MEDICINE

## 2024-05-31 ENCOUNTER — TELEPHONE (OUTPATIENT)
Dept: ELECTROPHYSIOLOGY | Facility: CLINIC | Age: 64
End: 2024-05-31
Payer: COMMERCIAL

## 2024-05-31 DIAGNOSIS — I87.2 VENOUS INSUFFICIENCY OF LOWER EXTREMITY: ICD-10-CM

## 2024-05-31 DIAGNOSIS — I48.91 NEW ONSET ATRIAL FIBRILLATION: Primary | ICD-10-CM

## 2024-05-31 NOTE — TELEPHONE ENCOUNTER
Spoke with patient regarding setting up DCCV after he has finished his Amiodarone loading dose . He started Amiodarone 200mg po BID on 5/23/24 and his DCCV will be done 6/25 after he has completed his loading dose. As long as he remains compliant with his Eliquis there will be no need for NUVIA prior to DCCV. Patient voiced understanding and instructions will be mailed as requested.

## 2024-06-04 ENCOUNTER — TELEPHONE (OUTPATIENT)
Dept: ELECTROPHYSIOLOGY | Facility: CLINIC | Age: 64
End: 2024-06-04
Payer: COMMERCIAL

## 2024-06-07 ENCOUNTER — TELEPHONE (OUTPATIENT)
Dept: SURGERY | Facility: CLINIC | Age: 64
End: 2024-06-07
Payer: COMMERCIAL

## 2024-06-10 ENCOUNTER — TELEPHONE (OUTPATIENT)
Dept: ELECTROPHYSIOLOGY | Facility: CLINIC | Age: 64
End: 2024-06-10
Payer: COMMERCIAL

## 2024-06-10 ENCOUNTER — OFFICE VISIT (OUTPATIENT)
Dept: OPTOMETRY | Facility: CLINIC | Age: 64
End: 2024-06-10
Payer: COMMERCIAL

## 2024-06-10 DIAGNOSIS — H52.203 HYPEROPIA OF BOTH EYES WITH ASTIGMATISM AND PRESBYOPIA: ICD-10-CM

## 2024-06-10 DIAGNOSIS — H52.03 HYPEROPIA OF BOTH EYES WITH ASTIGMATISM AND PRESBYOPIA: ICD-10-CM

## 2024-06-10 DIAGNOSIS — H43.393 VITREOUS FLOATERS, BILATERAL: Primary | ICD-10-CM

## 2024-06-10 DIAGNOSIS — H25.13 SENILE NUCLEAR CATARACT, BILATERAL: ICD-10-CM

## 2024-06-10 DIAGNOSIS — H53.9 VISION CHANGES: ICD-10-CM

## 2024-06-10 DIAGNOSIS — H52.4 HYPEROPIA OF BOTH EYES WITH ASTIGMATISM AND PRESBYOPIA: ICD-10-CM

## 2024-06-10 PROCEDURE — 3044F HG A1C LEVEL LT 7.0%: CPT | Mod: CPTII,S$GLB,, | Performed by: OPTOMETRIST

## 2024-06-10 PROCEDURE — 4010F ACE/ARB THERAPY RXD/TAKEN: CPT | Mod: CPTII,S$GLB,, | Performed by: OPTOMETRIST

## 2024-06-10 PROCEDURE — 1159F MED LIST DOCD IN RCRD: CPT | Mod: CPTII,S$GLB,, | Performed by: OPTOMETRIST

## 2024-06-10 PROCEDURE — 92004 COMPRE OPH EXAM NEW PT 1/>: CPT | Mod: S$GLB,,, | Performed by: OPTOMETRIST

## 2024-06-10 PROCEDURE — 92015 DETERMINE REFRACTIVE STATE: CPT | Mod: S$GLB,,, | Performed by: OPTOMETRIST

## 2024-06-10 PROCEDURE — 99999 PR PBB SHADOW E&M-EST. PATIENT-LVL III: CPT | Mod: PBBFAC,,, | Performed by: OPTOMETRIST

## 2024-06-10 NOTE — TELEPHONE ENCOUNTER
----- Message from Kathie Hammer RN sent at 6/7/2024  1:47 PM CDT -----  Regarding: RE: Appointment  Ok. Just let the patient know he will follow up after his scheduled cardioversion. His appointment with Sander may have to be moved back to after 7/25.  ----- Message -----  From: Paola Wilson MA  Sent: 6/7/2024   1:40 PM CDT  To: Kathie Hammer RN  Subject: RE: Appointment                                  He was on the post op list so that's why the appt was scheduled. But you can cancel it.    Thanks  ----- Message -----  From: Kathie Hammer RN  Sent: 6/7/2024   1:30 PM CDT  To: Paola Wilson MA  Subject: Appointment                                      This patient is scheduled for a cardioversion on 6/25. Does he still need the appointment. He has an appointment scheduled for post cardioversion already. Not sure what Keo is going to do other than say he needs the cardioversion.

## 2024-06-10 NOTE — PROGRESS NOTES
HPI    DLS: Over 7 yrs.     C/o: Pt states he noticed a funny [place] in vision in OD x 2 months.  The   circles move around when pt moves his eye. States it looks like bubbles in   vision. No complaints with OS.   Wears otc readers does ok with distance vision.     Meds:  No gtts     No eye surgery or lasers.     Family hx: None   Last edited by Jayshree Vazquez MA on 6/10/2024  8:58 AM.            Assessment /Plan     For exam results, see Encounter Report.    Vitreous floaters, bilateral    Vision changes  -     Ambulatory referral/consult to Optometry    Senile nuclear cataract, bilateral    Hyperopia of both eyes with astigmatism and presbyopia      MONITOR. ED PT ON ALL EXAM FINDINGS  RX FINAL SPECS   MILD NS OU; UV PROTECTION; MONITOR.   INTERMITTENT VITREOUS FLOATERS; RETINA INTACT 360 OD, OS; REVIEWED RD S/S. RTC STAT IF EXPERIENCED.   RTC 1 YR//PRN FOR REE/DFE

## 2024-06-10 NOTE — TELEPHONE ENCOUNTER
Called pt to inform him he does need to fast prior to his procedure on 6/25, no answer no voicemail available

## 2024-06-11 ENCOUNTER — LAB VISIT (OUTPATIENT)
Dept: LAB | Facility: HOSPITAL | Age: 64
End: 2024-06-11
Attending: INTERNAL MEDICINE
Payer: COMMERCIAL

## 2024-06-11 ENCOUNTER — OFFICE VISIT (OUTPATIENT)
Dept: SURGERY | Facility: CLINIC | Age: 64
End: 2024-06-11
Payer: COMMERCIAL

## 2024-06-11 VITALS
WEIGHT: 242.94 LBS | SYSTOLIC BLOOD PRESSURE: 152 MMHG | HEIGHT: 78 IN | BODY MASS INDEX: 28.11 KG/M2 | HEART RATE: 64 BPM | DIASTOLIC BLOOD PRESSURE: 98 MMHG

## 2024-06-11 DIAGNOSIS — I87.2 VENOUS INSUFFICIENCY OF LOWER EXTREMITY: ICD-10-CM

## 2024-06-11 DIAGNOSIS — K57.92 DIVERTICULITIS: Primary | ICD-10-CM

## 2024-06-11 DIAGNOSIS — K59.00 CONSTIPATION, UNSPECIFIED CONSTIPATION TYPE: ICD-10-CM

## 2024-06-11 DIAGNOSIS — I48.91 NEW ONSET ATRIAL FIBRILLATION: ICD-10-CM

## 2024-06-11 LAB
ANION GAP SERPL CALC-SCNC: 13 MMOL/L (ref 8–16)
APTT PPP: 31.2 SEC (ref 21–32)
BUN SERPL-MCNC: 15 MG/DL (ref 8–23)
CALCIUM SERPL-MCNC: 9.3 MG/DL (ref 8.7–10.5)
CHLORIDE SERPL-SCNC: 102 MMOL/L (ref 95–110)
CO2 SERPL-SCNC: 23 MMOL/L (ref 23–29)
CREAT SERPL-MCNC: 1.1 MG/DL (ref 0.5–1.4)
ERYTHROCYTE [DISTWIDTH] IN BLOOD BY AUTOMATED COUNT: 13.9 % (ref 11.5–14.5)
EST. GFR  (NO RACE VARIABLE): >60 ML/MIN/1.73 M^2
GLUCOSE SERPL-MCNC: 75 MG/DL (ref 70–110)
HCT VFR BLD AUTO: 44.3 % (ref 40–54)
HGB BLD-MCNC: 14.4 G/DL (ref 14–18)
INR PPP: 1 (ref 0.8–1.2)
MCH RBC QN AUTO: 29.6 PG (ref 27–31)
MCHC RBC AUTO-ENTMCNC: 32.5 G/DL (ref 32–36)
MCV RBC AUTO: 91 FL (ref 82–98)
PLATELET # BLD AUTO: 228 K/UL (ref 150–450)
PMV BLD AUTO: 10.9 FL (ref 9.2–12.9)
POTASSIUM SERPL-SCNC: 4.1 MMOL/L (ref 3.5–5.1)
PROTHROMBIN TIME: 10.9 SEC (ref 9–12.5)
RBC # BLD AUTO: 4.87 M/UL (ref 4.6–6.2)
SODIUM SERPL-SCNC: 138 MMOL/L (ref 136–145)
WBC # BLD AUTO: 5.94 K/UL (ref 3.9–12.7)

## 2024-06-11 PROCEDURE — 80048 BASIC METABOLIC PNL TOTAL CA: CPT | Performed by: INTERNAL MEDICINE

## 2024-06-11 PROCEDURE — 1160F RVW MEDS BY RX/DR IN RCRD: CPT | Mod: CPTII,S$GLB,, | Performed by: COLON & RECTAL SURGERY

## 2024-06-11 PROCEDURE — 36415 COLL VENOUS BLD VENIPUNCTURE: CPT | Performed by: INTERNAL MEDICINE

## 2024-06-11 PROCEDURE — 85730 THROMBOPLASTIN TIME PARTIAL: CPT | Performed by: INTERNAL MEDICINE

## 2024-06-11 PROCEDURE — 1159F MED LIST DOCD IN RCRD: CPT | Mod: CPTII,S$GLB,, | Performed by: COLON & RECTAL SURGERY

## 2024-06-11 PROCEDURE — 3077F SYST BP >= 140 MM HG: CPT | Mod: CPTII,S$GLB,, | Performed by: COLON & RECTAL SURGERY

## 2024-06-11 PROCEDURE — 3080F DIAST BP >= 90 MM HG: CPT | Mod: CPTII,S$GLB,, | Performed by: COLON & RECTAL SURGERY

## 2024-06-11 PROCEDURE — 99999 PR PBB SHADOW E&M-EST. PATIENT-LVL IV: CPT | Mod: PBBFAC,,, | Performed by: COLON & RECTAL SURGERY

## 2024-06-11 PROCEDURE — 3044F HG A1C LEVEL LT 7.0%: CPT | Mod: CPTII,S$GLB,, | Performed by: COLON & RECTAL SURGERY

## 2024-06-11 PROCEDURE — 3008F BODY MASS INDEX DOCD: CPT | Mod: CPTII,S$GLB,, | Performed by: COLON & RECTAL SURGERY

## 2024-06-11 PROCEDURE — 4010F ACE/ARB THERAPY RXD/TAKEN: CPT | Mod: CPTII,S$GLB,, | Performed by: COLON & RECTAL SURGERY

## 2024-06-11 PROCEDURE — 85027 COMPLETE CBC AUTOMATED: CPT | Performed by: INTERNAL MEDICINE

## 2024-06-11 PROCEDURE — 85610 PROTHROMBIN TIME: CPT | Performed by: INTERNAL MEDICINE

## 2024-06-11 PROCEDURE — 99204 OFFICE O/P NEW MOD 45 MIN: CPT | Mod: S$GLB,,, | Performed by: COLON & RECTAL SURGERY

## 2024-06-11 NOTE — PROGRESS NOTES
CRS Office Visit History and Physical    Referring Md:   Jose A Ott Do  [address] [facility]  [place] [address]    SUBJECTIVE:     Chief Complaint: diverticulitis    History of Present Illness:  The patient is a new patient to this practice.   Course is as follows:  Patient is a 63 y.o. male presents with diverticulitis  His main issue has been difficulty with defecation over the past several years.  He has 1-2 bowel movements a week.  Otherwise throughout the week, he has a very small amount of fragmented stool with incomplete defecation.  He has had 2 CT scans in July of 2023 as well as April 20, 2024 that demonstrates some inflammation in the colon concerning for diverticular disease.  He denies any left-sided abdominal pain or lower abdominal pain.  Most of his pain is left upper and epigastric abdominal pain.  He is on therapeutic anticoagulation with Eliquis.  He takes Metamucil intermittently with no significant improvement.    Last Colonoscopy: 10/13/2022:   Impression:            - Diverticulosis in the sigmoid colon, in the                          descending colon and in the transverse colon.                          - One 5 mm polyp in the transverse colon, removed                          with a cold snare. Complete resection. Polyp                          tissue not retrieved.                          - The examination was otherwise normal on direct                          and retroflexion views.     Review of patient's allergies indicates:  No Known Allergies    Past Medical History:   Diagnosis Date    Blood clot in vein     Right Leg, 2009    Diabetes mellitus     Diabetes mellitus, type 2     Gout flare     Hyperlipemia     Prostate atrophy     Varicose veins      Past Surgical History:   Procedure Laterality Date    COLONOSCOPY N/A 10/13/2022    Procedure: COLONOSCOPY Ace;  Surgeon: Sacha Mead MD;  Location: [facility];  Service: Endoscopy;  Laterality: N/A;     "ECHOCARDIOGRAM,TRANSESOPHAGEAL N/A 2024    Procedure: Transesophageal echo (NUVIA) intra-procedure log documentation;  Surgeon: Jas Castellano MD;  Location: [facility] EP LAB;  Service: Cardiology;  Laterality: N/A;  AF, NUVIA/DCCV, MAC, NM, 3prep    TREATMENT OF CARDIAC ARRHYTHMIA N/A 2024    Procedure: Cardioversion or Defibrillation;  Surgeon: Sanchez Boucher MD;  Location: [facility] EP LAB;  Service: Cardiology;  Laterality: N/A;  AF, NUVIA/DCCV, MAC, NM, 3prep    VARICOSE VEIN SURGERY       Family History   Problem Relation Name Age of Onset    Diabetes Father      Diabetes Sister       Social History     Tobacco Use    Smoking status: Former     Current packs/day: 0.00     Types: Cigarettes     Start date:      Quit date:      Years since quittin.4    Smokeless tobacco: Never   Substance Use Topics    Alcohol use: Yes     Alcohol/week: 4.0 standard drinks of alcohol     Types: 4 Cans of beer per week     Comment: drinks beer, 2 cans, about 2x/week    Drug use: No        Review of Systems:  Review of Systems   Constitutional:  Negative for chills, diaphoresis, fever, malaise/fatigue and weight loss.   HENT:  Negative for congestion.    Respiratory:  Negative for shortness of breath.    Cardiovascular:  Negative for chest pain and leg swelling.   Gastrointestinal:  Positive for abdominal pain and constipation. Negative for blood in stool, nausea and vomiting.   Genitourinary:  Negative for dysuria.   Musculoskeletal:  Negative for back pain and myalgias.   Skin:  Negative for rash.   Neurological:  Negative for dizziness and weakness.   Endo/Heme/Allergies:  Does not bruise/bleed easily.   Psychiatric/Behavioral:  Negative for depression.        OBJECTIVE:     Vital Signs (Most Recent)  BP (!) 152/98 (BP Location: Left arm, Patient Position: Sitting, BP Method: Large (Automatic))   Pulse 64   Ht 6' 7" (2.007 m)   Wt 110.2 kg (242 lb 15.2 oz)   BMI 27.37 kg/m²     Physical Exam:  General: White male " in no distress   Neuro: alert and oriented x 4.  Moves all extremities.     HEENT: no icterus.  Trachea midline  Respiratory: respirations are even and unlabored  Cardiac: regular rate  Abdomen:  Protuberant, soft, no scars, small umbilical hernia.  No masses.  Nontender in the left lower quadrant.  Extremities: Warm dry and intact  Skin: no rashes  Anorectal:  Deferred    Labs: H&H 14 and 44.  Normal renal function    Imaging:   CT abd 7/17/2023: Findings concerning for acute diverticulitis of the distal descending colon.   CT abd pelvis 4/30/24: Mild diverticulitis of the lower descending colon superimposed on diffuse diverticulosis.         ASSESSMENT/PLAN:     Diagnoses and all orders for this visit:    Diverticulitis  -     Ambulatory referral/consult to Colorectal Surgery    Constipation, unspecified constipation type  -     X-Ray Abdomen AP 1 View; Future  -     X-Ray Abdomen AP 1 View; Future  -     X-Ray Abdomen AP 1 View; Future        63 y.o. male with mild diverticular disease in the setting of constipation.  Symptomatically, it appears that his constipation is the most significant issue.  He has been able unable to try Linzess in the past secondary to financial constraints.  Recommended starting with MiraLax once to twice per day and escalating as necessary.  We will plan for a Sitz marker study to evaluate for colonic motility.  I will follow up with him in 4-6 weeks for interval improvement.  If there is no improvement, we can then trial prescription based constipation medications.    EDMOND Zavala MD, FACS, FASCRS  Staff Surgeon  Colon & Rectal Surgery

## 2024-06-17 ENCOUNTER — HOSPITAL ENCOUNTER (OUTPATIENT)
Dept: RADIOLOGY | Facility: HOSPITAL | Age: 64
Discharge: HOME | End: 2024-06-17
Attending: COLON & RECTAL SURGERY
Payer: COMMERCIAL

## 2024-06-17 DIAGNOSIS — K59.00 CONSTIPATION, UNSPECIFIED CONSTIPATION TYPE: ICD-10-CM

## 2024-06-17 PROCEDURE — 74018 RADEX ABDOMEN 1 VIEW: CPT | Mod: 26,,, | Performed by: RADIOLOGY

## 2024-06-17 PROCEDURE — 74018 RADEX ABDOMEN 1 VIEW: CPT | Mod: TC,FY

## 2024-06-19 ENCOUNTER — HOSPITAL ENCOUNTER (OUTPATIENT)
Dept: RADIOLOGY | Facility: HOSPITAL | Age: 64
Discharge: HOME | End: 2024-06-19
Attending: COLON & RECTAL SURGERY
Payer: COMMERCIAL

## 2024-06-19 DIAGNOSIS — K59.00 CONSTIPATION, UNSPECIFIED CONSTIPATION TYPE: ICD-10-CM

## 2024-06-19 PROCEDURE — 74018 RADEX ABDOMEN 1 VIEW: CPT | Mod: TC,FY

## 2024-06-19 PROCEDURE — 74018 RADEX ABDOMEN 1 VIEW: CPT | Mod: 26,,, | Performed by: RADIOLOGY

## 2024-06-21 ENCOUNTER — HOSPITAL ENCOUNTER (OUTPATIENT)
Dept: RADIOLOGY | Facility: HOSPITAL | Age: 64
Discharge: HOME | End: 2024-06-21
Attending: COLON & RECTAL SURGERY
Payer: COMMERCIAL

## 2024-06-21 DIAGNOSIS — K59.00 CONSTIPATION, UNSPECIFIED CONSTIPATION TYPE: ICD-10-CM

## 2024-06-21 PROCEDURE — 74018 RADEX ABDOMEN 1 VIEW: CPT | Mod: TC,FY

## 2024-06-21 PROCEDURE — 74018 RADEX ABDOMEN 1 VIEW: CPT | Mod: 26,,, | Performed by: RADIOLOGY

## 2024-06-24 ENCOUNTER — TELEPHONE (OUTPATIENT)
Dept: ELECTROPHYSIOLOGY | Facility: CLINIC | Age: 64
End: 2024-06-24
Payer: COMMERCIAL

## 2024-06-24 NOTE — TELEPHONE ENCOUNTER
Attempted to reach Mr Norris to review tomorrow's procedure however unable to reach him and no vmail available,     Spoke to patient    CONFIRMED procedure arrival time of 10 AM on 6/25/24    Reiterated instructions including:  -Directions to check in desk  -NPO after midnight night prior to procedure  -High importance of maintaining Eliquis as prescribed and he has not missed any doses  -Confirmed compliance of completeng 2 week loading Amiodarone as ordered  -Pre-procedure LABS were reviewed  -Confirmed absence  of implanted device/stimulator   -Confirmed no fever, cough, or shortness of breath in the past 30 days  --Reviewed current visitor policy    Patient verbalized understanding of above and appreciated the call.

## 2024-06-25 ENCOUNTER — ANESTHESIA (OUTPATIENT)
Dept: MEDSURG UNIT | Facility: HOSPITAL | Age: 64
End: 2024-06-25
Payer: COMMERCIAL

## 2024-06-25 ENCOUNTER — HOSPITAL ENCOUNTER (OUTPATIENT)
Facility: HOSPITAL | Age: 64
Discharge: HOME | End: 2024-06-25
Attending: INTERNAL MEDICINE | Admitting: INTERNAL MEDICINE
Payer: COMMERCIAL

## 2024-06-25 ENCOUNTER — ANESTHESIA EVENT (OUTPATIENT)
Dept: MEDSURG UNIT | Facility: HOSPITAL | Age: 64
End: 2024-06-25
Payer: COMMERCIAL

## 2024-06-25 VITALS
WEIGHT: 250 LBS | HEART RATE: 57 BPM | SYSTOLIC BLOOD PRESSURE: 180 MMHG | DIASTOLIC BLOOD PRESSURE: 112 MMHG | TEMPERATURE: 98 F | RESPIRATION RATE: 19 BRPM | HEIGHT: 78 IN | BODY MASS INDEX: 28.93 KG/M2 | OXYGEN SATURATION: 97 %

## 2024-06-25 DIAGNOSIS — I48.91 ATRIAL FIBRILLATION: ICD-10-CM

## 2024-06-25 DIAGNOSIS — I48.91 A-FIB: ICD-10-CM

## 2024-06-25 DIAGNOSIS — I48.91 NEW ONSET ATRIAL FIBRILLATION: ICD-10-CM

## 2024-06-25 LAB
OHS QRS DURATION: 102 MS
OHS QRS DURATION: 108 MS
OHS QTC CALCULATION: 472 MS
OHS QTC CALCULATION: 485 MS
POCT GLUCOSE: 92 MG/DL (ref 70–110)

## 2024-06-25 PROCEDURE — 37000008 HC ANESTHESIA 1ST 15 MINUTES: Performed by: INTERNAL MEDICINE

## 2024-06-25 PROCEDURE — 92960 CARDIOVERSION ELECTRIC EXT: CPT | Mod: ,,, | Performed by: INTERNAL MEDICINE

## 2024-06-25 PROCEDURE — 63600175 PHARM REV CODE 636 W HCPCS: Performed by: NURSE ANESTHETIST, CERTIFIED REGISTERED

## 2024-06-25 PROCEDURE — 37000009 HC ANESTHESIA EA ADD 15 MINS: Performed by: INTERNAL MEDICINE

## 2024-06-25 PROCEDURE — 93005 ELECTROCARDIOGRAM TRACING: CPT | Mod: 59

## 2024-06-25 PROCEDURE — 92960 CARDIOVERSION ELECTRIC EXT: CPT | Performed by: INTERNAL MEDICINE

## 2024-06-25 PROCEDURE — 93005 ELECTROCARDIOGRAM TRACING: CPT

## 2024-06-25 PROCEDURE — 82962 GLUCOSE BLOOD TEST: CPT | Performed by: INTERNAL MEDICINE

## 2024-06-25 PROCEDURE — 25000003 PHARM REV CODE 250: Performed by: NURSE ANESTHETIST, CERTIFIED REGISTERED

## 2024-06-25 PROCEDURE — 93010 ELECTROCARDIOGRAM REPORT: CPT | Mod: ,,, | Performed by: INTERNAL MEDICINE

## 2024-06-25 RX ORDER — HALOPERIDOL 5 MG/ML
0.5 INJECTION INTRAMUSCULAR EVERY 10 MIN PRN
Status: DISCONTINUED | OUTPATIENT
Start: 2024-06-25 | End: 2024-06-25 | Stop reason: HOSPADM

## 2024-06-25 RX ORDER — SODIUM CHLORIDE 0.9 % (FLUSH) 0.9 %
3 SYRINGE (ML) INJECTION
Status: DISCONTINUED | OUTPATIENT
Start: 2024-06-25 | End: 2024-06-25 | Stop reason: HOSPADM

## 2024-06-25 RX ORDER — DIPHENHYDRAMINE HYDROCHLORIDE 50 MG/ML
25 INJECTION INTRAMUSCULAR; INTRAVENOUS EVERY 6 HOURS PRN
Status: DISCONTINUED | OUTPATIENT
Start: 2024-06-25 | End: 2024-06-25 | Stop reason: HOSPADM

## 2024-06-25 RX ORDER — PROCHLORPERAZINE EDISYLATE 5 MG/ML
5 INJECTION INTRAMUSCULAR; INTRAVENOUS EVERY 30 MIN PRN
Status: DISCONTINUED | OUTPATIENT
Start: 2024-06-25 | End: 2024-06-25 | Stop reason: HOSPADM

## 2024-06-25 RX ORDER — CARVEDILOL 6.25 MG/1
6.25 TABLET ORAL 2 TIMES DAILY WITH MEALS
Qty: 180 TABLET | Refills: 3 | Status: SHIPPED | OUTPATIENT
Start: 2024-06-25 | End: 2025-06-25

## 2024-06-25 RX ORDER — FENTANYL CITRATE 50 UG/ML
25 INJECTION, SOLUTION INTRAMUSCULAR; INTRAVENOUS EVERY 5 MIN PRN
Status: DISCONTINUED | OUTPATIENT
Start: 2024-06-25 | End: 2024-06-25 | Stop reason: HOSPADM

## 2024-06-25 RX ORDER — HYDROMORPHONE HYDROCHLORIDE 1 MG/ML
0.2 INJECTION, SOLUTION INTRAMUSCULAR; INTRAVENOUS; SUBCUTANEOUS EVERY 5 MIN PRN
Status: DISCONTINUED | OUTPATIENT
Start: 2024-06-25 | End: 2024-06-25 | Stop reason: HOSPADM

## 2024-06-25 RX ORDER — PROPOFOL 10 MG/ML
VIAL (ML) INTRAVENOUS
Status: DISCONTINUED | OUTPATIENT
Start: 2024-06-25 | End: 2024-06-25

## 2024-06-25 RX ORDER — LIDOCAINE HYDROCHLORIDE 20 MG/ML
INJECTION INTRAVENOUS
Status: DISCONTINUED | OUTPATIENT
Start: 2024-06-25 | End: 2024-06-25

## 2024-06-25 RX ADMIN — LIDOCAINE HYDROCHLORIDE 50 MG: 20 INJECTION INTRAVENOUS at 11:06

## 2024-06-25 RX ADMIN — PROPOFOL 70 MG: 10 INJECTION, EMULSION INTRAVENOUS at 11:06

## 2024-06-25 RX ADMIN — PROPOFOL 50 MG: 10 INJECTION, EMULSION INTRAVENOUS at 11:06

## 2024-06-25 RX ADMIN — SODIUM CHLORIDE: 0.9 INJECTION, SOLUTION INTRAVENOUS at 11:06

## 2024-06-25 NOTE — NURSING
Pt friend and patient both verbalized an understanding of d/c home.  Ok to d/c home per CHOCO Gramajo.  Pt to start new BP med which was sent to his pharmacy in [place].  IV d/c'd with cath tip intact. Refused wheelchair and ambulated off unit for d/c home.  Pt accompanied by his friend.

## 2024-06-25 NOTE — ANESTHESIA PREPROCEDURE EVALUATION
06/25/2024  Pre-operative evaluation for Procedure(s) (LRB):  Cardioversion or Defibrillation (N/A)    Kevan Norris is a 63 y.o. male with afib here for cardioversion. Hx of systolic dysfunction, possibly tachycardia-induced (LVEF 30% on previous TTE)    Patient Active Problem List   Diagnosis    Other hyperlipidemia    Chronic gout without tophus    GERD without esophagitis    Essential hypertension    BMI 27.0-27.9,adult    History of DVT (deep vein thrombosis)    Varicose veins of left lower extremity with pain    Umbilical hernia without obstruction and without gangrene    Asymptomatic microscopic hematuria    Vitamin D deficiency    Anemia due to vitamin B12 deficiency    Venous insufficiency of lower extremity    Diverticulosis in the sigmoid colon, in the desending colon, and the transverse colon    New onset atrial fibrillation    Aortic atherosclerosis       Review of patient's allergies indicates:  No Known Allergies    No current facility-administered medications on file prior to encounter.     Current Outpatient Medications on File Prior to Encounter   Medication Sig Dispense Refill    amiodarone (PACERONE) 200 MG Tab Take 400 mg (2 tabs) TWICE daily for 14 days and then decrease to 200 mg (1 tab) ONCE daily 70 tablet 2    apixaban (ELIQUIS) 5 mg Tab Take 1 tablet (5 mg total) by mouth 2 (two) times daily. 180 tablet 3    cyanocobalamin, vitamin B-12, 2,500 mcg Tab Take 5,000 mcg by mouth once daily.      ergocalciferol (ERGOCALCIFEROL) 50,000 unit Cap TAKE 1 CAPSULE BY MOUTH EVERY 7 DAYS. THEN START DAILY OTC REPLACEMENT AFTER THIS RX IS COMPLETE 12 capsule 3    losartan (COZAAR) 100 MG tablet Take 1 tablet (100 mg total) by mouth every evening. For blood pressure. 90 tablet 1    rosuvastatin (CRESTOR) 5 MG tablet Take 1 tablet (5 mg total) by mouth once daily. For cholesterol 90 tablet 3     aspirin 81 mg Cap Take by mouth.      fluorouraciL (EFUDEX) 5 % cream Apply thin film to left lower forearm 2times per day for 3 weeks; d/c if area bleeding or ulcerated; avoid eyes or mouth 40 g 1    gentamicin (GARAMYCIN) 0.1 % ointment Apply topically.      ketoconazole (NIZORAL) 2 % cream AAA bid x 3 weeks 60 g 3    lubiprostone (AMITIZA) 8 MCG Cap Take 1 capsule (8 mcg total) by mouth 2 (two) times daily. 60 capsule 5       Past Surgical History:   Procedure Laterality Date    COLONOSCOPY N/A 10/13/2022    Procedure: COLONOSCOPY Golytely;  Surgeon: Sacha Mead MD;  Location: [facility] ENDO;  Service: Endoscopy;  Laterality: N/A;    ECHOCARDIOGRAM,TRANSESOPHAGEAL N/A 2024    Procedure: Transesophageal echo (NUVIA) intra-procedure log documentation;  Surgeon: Jas Castellano MD;  Location: [facility] EP LAB;  Service: Cardiology;  Laterality: N/A;  AF, NUVIA/DCCV, MAC, VT, 3prep    TREATMENT OF CARDIAC ARRHYTHMIA N/A 2024    Procedure: Cardioversion or Defibrillation;  Surgeon: Sanchez Boucher MD;  Location: [facility] EP LAB;  Service: Cardiology;  Laterality: N/A;  AF, NUVIA/DCCV, MAC, VT, 3prep    VARICOSE VEIN SURGERY         Social History     Socioeconomic History    Marital status: Single   Tobacco Use    Smoking status: Former     Current packs/day: 0.00     Types: Cigarettes     Start date:      Quit date:      Years since quittin.4    Smokeless tobacco: Never   Substance and Sexual Activity    Alcohol use: Yes     Alcohol/week: 4.0 standard drinks of alcohol     Types: 4 Cans of beer per week     Comment: drinks beer, 2 cans, about 2x/week    Drug use: No    Sexual activity: Not Currently   Social History Narrative    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 No children. Two sisters, two brothers. One sister lives in [facility] in [place]. One sister lives in [place]. One dog at home.              Pre-op Assessment    I have reviewed the Patient Summary Reports.     I have reviewed the  Nursing Notes. I have reviewed the NPO Status.      Review of Systems  Anesthesia Hx:  No problems with previous Anesthesia                Cardiovascular:     Hypertension    Dysrhythmias atrial fibrillation  CHF                                 Pulmonary:  Pulmonary Normal                       Hepatic/GI:     GERD             Endocrine:  Diabetes               Physical Exam    Airway:  Mallampati: II   Mouth Opening: Normal  Tongue: Normal    Chest/Lungs:  Normal Respiratory Rate    Heart:  Rhythm: Regular Rhythm        Anesthesia Plan  Type of Anesthesia, risks & benefits discussed:    Anesthesia Type: Gen Natural Airway  Intra-op Monitoring Plan: Standard ASA Monitors  Induction:  IV  Informed Consent: Informed consent signed with the Patient and all parties understand the risks and agree with anesthesia plan.  All questions answered.   ASA Score: 3    Ready For Surgery From Anesthesia Perspective.     .

## 2024-06-25 NOTE — TRANSFER OF CARE
"Anesthesia Transfer of Care Note    Patient: Kevan Norris    Procedure(s) Performed: Procedure(s) (LRB):  Cardioversion or Defibrillation (N/A)    Patient location: [facility]    Anesthesia Type: general and MAC    Transport from OR: Transported from OR on room air with adequate spontaneous ventilation    Post pain: adequate analgesia    Post assessment: no apparent anesthetic complications and tolerated procedure well    Post vital signs: stable    Level of consciousness: awake and alert    Nausea/Vomiting: no nausea/vomiting    Complications: none    Transfer of care protocol was followed      Last vitals: Visit Vitals  /96(BP Location: Left arm, Patient Position: Sitting)   Pulse 58   Temp 36.7 °C (98.1 °F) (Temporal)   Resp 14   Ht 6' 7" (2.007 m)   Wt 113.4 kg (250 lb)   SpO2 96%   BMI 28.16 kg/m²     "

## 2024-06-25 NOTE — PLAN OF CARE
Patient arrived to room. PIV placed. Admit assessment completed. Plan of care discussed with patient. Anesthesia MD at bedside and Rox WILHELM notified of admitting BPs.

## 2024-06-25 NOTE — HOSPITAL COURSE
Patient underwent DCCV, converting from AF to sinus rhythm. Patient tolerated the procedure without any acute complications. Post-DCCV ECG revealed *** at *** bpm. Plan to continue all home medications including amiodarone and eliquis. Instructed to return in 1 week for follow up ECG. Clinic appointment with Dr. Boucher scheduled for 7/16/24.  Patient was assessed at bedside prior to discharge, they reported feeling well and denied chest discomfort, shortness of breath, palpitations, lightheadedness, or any other acute symptoms. Discharge instructions were discussed with patient and all questions were answered. Patient was discharged home in stable condition.

## 2024-06-25 NOTE — DISCHARGE INSTRUCTIONS
Medications:  -START carvedilol 6.25mg twice daily (morning and evening).  -Continue to take all other home medications as listed on your medication list after you are discharged.    Diet  -You may resume oral intake after you are discharged, as long you have no swallowing difficulties.    Because you have received sedation for this procedure:  -Limit activity for the remainder of the day.  -Do not smoke for at least 6 hours and until you are fully awake and alert.  -Do not drink alcoholic beverage for 24 hours.  -Do not drive for 24 hours.  -Defer important decision making until the following day.     Go to the Emergency Department if you develop:   -Bleeding  -Weakness or numbness  -Visual, gait or speech disturbance  -New chest pain, palpitations, shortness of breath, rapid heart beat, or fainting  -Fever    Follow up:  -EKG in 1 week.  -Clinic appointment with Dr. Boucher scheduled for 7/16/24.    Any need to reschedule or cancel procedures, or any questions regarding your procedures should be addressed directly with the Arrhythmia Department Nurses at the following phone number: [phone].

## 2024-06-25 NOTE — DISCHARGE SUMMARY
[facility]  Cardiology  Discharge Summary      Patient Name: Kevan Norris  MRN: [id number]  Admission Date: 6/25/2024  Hospital Length of Stay: 0 days  Discharge Date and Time:  06/25/2024 12:55 PM  Attending Physician: Sanchez Boucher MD    Discharging Provider: Rox Yap PA-C  Primary Care Physician: Susan, Primary Doctor    HPI:   Last OV with Dr. Boucher on 4/26/24.   63 year-old man with hypertension, aortic atherosclerosis observed on prior CT scan, prior DVT treated with coumadin, microscopic hematuria, and IBS. He presented recently to Dr. Ott for annual follow-up. He was observed to be in atrial fibrillation. Eliquis was started. He has no obvious symptoms.      On 5/23/24 he underwent successful NUVIA/DCCV and started on Amiodarone: 400 mg bid x 14 days then decrease to 200 mg daily. Unfortunately his 1-week post-DCCV EKG revealed he was back in AF. He was instructed to finish the loading dose of amiodarone and then present for repeat DCCV.     Today, here for DCCV. NUVIA deferred as patient reports 100% compliance with eliquis. No acute complaints. Brother-in-law at bedside.      Procedure(s) (LRB):  Cardioversion or Defibrillation (N/A)       Indwelling Lines/Drains at time of discharge:  none      Hospital Course:  Patient underwent DCCV, converting from AF to sinus rhythm. Patient tolerated the procedure without any acute complications. Post-DCCV ECG revealed sinus bradycardia at 54 bpm. He had high BP readings on admission that continued through recovery (170s/110s) - he remained asymptomatic and reports he does not know how his blood pressure runs at home. Discussed with Dr. Boucher, will start carvedilol 6.25mg BID and continue all home medications including amiodarone and eliquis. Encouraged home BP monitoring and follow up with PCP or general cardiologist re: HTN. Instructed to return in 1 week for follow up ECG. Clinic appointment with Dr. Boucher scheduled for 7/16/24.  Patient  was assessed at bedside prior to discharge, they reported feeling well and denied chest discomfort, shortness of breath, palpitations, lightheadedness, or any other acute symptoms. Discharge instructions were discussed with patient and all questions were answered. Patient was discharged home in stable condition.         Goals of Care Treatment Preferences:  Code Status: Full Code      Pending Diagnostic Studies:       None            There are no hospital problems to display for this patient.    No new Assessment & Plan notes have been filed under this hospital service since the last note was generated.  Service: Cardiology      Discharged Condition: stable    Disposition: Home or Self Care    Follow Up:  Clinic appointment with Dr. Boucher scheduled for 7/16/24.    Patient Instructions:   No discharge procedures on file.  Medications:  Reconciled Home Medications:      Medication List        START taking these medications      carvediloL 6.25 MG tablet  Commonly known as: COREG  Take 1 tablet (6.25 mg total) by mouth 2 (two) times daily with meals.            CONTINUE taking these medications      amiodarone 200 MG Tab  Commonly known as: PACERONE  Take 400 mg (2 tabs) TWICE daily for 14 days and then decrease to 200 mg (1 tab) ONCE daily     apixaban 5 mg Tab  Commonly known as: ELIQUIS  Take 1 tablet (5 mg total) by mouth 2 (two) times daily.     aspirin 81 mg Cap  Take by mouth.     cyanocobalamin (vitamin B-12) 2,500 mcg Tab  Take 5,000 mcg by mouth once daily.     ergocalciferol 50,000 unit Cap  Commonly known as: ERGOCALCIFEROL  TAKE 1 CAPSULE BY MOUTH EVERY 7 DAYS. THEN START DAILY OTC REPLACEMENT AFTER THIS RX IS COMPLETE     fluorouraciL 5 % cream  Commonly known as: EFUDEX  Apply thin film to left lower forearm 2times per day for 3 weeks; d/c if area bleeding or ulcerated; avoid eyes or mouth     gentamicin 0.1 % ointment  Commonly known as: GARAMYCIN  Apply topically.     ketoconazole 2 % cream  Commonly  known as: NIZORAL  AAA bid x 3 weeks     losartan 100 MG tablet  Commonly known as: COZAAR  Take 1 tablet (100 mg total) by mouth every evening. For blood pressure.     lubiprostone 8 MCG Cap  Commonly known as: AMITIZA  Take 1 capsule (8 mcg total) by mouth 2 (two) times daily.     rosuvastatin 5 MG tablet  Commonly known as: CRESTOR  Take 1 tablet (5 mg total) by mouth once daily. For cholesterol              Time spent on the discharge of patient: 35 minutes    Rox Yap PA-C  Cardiology  [facility]

## 2024-06-25 NOTE — ANESTHESIA POSTPROCEDURE EVALUATION
Anesthesia Post Evaluation    Patient: Kevan Nroris    Procedure(s) Performed: Procedure(s) (LRB):  Cardioversion or Defibrillation (N/A)    Final Anesthesia Type: general      Patient location during evaluation: PACU  Patient participation: Yes- Able to Participate  Level of consciousness: awake and alert  Post-procedure vital signs: reviewed and stable  Pain management: adequate  Airway patency: patent    PONV status at discharge: No PONV  Anesthetic complications: no      Cardiovascular status: blood pressure returned to baseline  Respiratory status: unassisted  Follow-up not needed.                No case tracking events are documented in the log.      Pain/Laurent Score: Laurent Score: 10 (6/25/2024 12:45 PM)           Billing Type: Third-Party Bill Bill For Surgical Tray: no

## 2024-06-25 NOTE — H&P
[facility]  Cardiology  DCCV History & Physical      Kevan Norris  YOB: 1960  Medical Record Number:  [id number]  Attending Physician:  Sanchez Boucher MD   Date of Admission: 6/25/2024       Hospital Day:  0  Current Principal Problem:  Atrial fibrillation      History     Cc: DCCV for AF    HPI  Last OV with Dr. Boucher on 4/26/24.   63 year-old man with hypertension, aortic atherosclerosis observed on prior CT scan, prior DVT treated with coumadin, microscopic hematuria, and IBS. He presented recently to Dr. Ott for annual follow-up. He was observed to be in atrial fibrillation. Eliquis was started. He has no obvious symptoms.     On 5/23/24 he underwent successful NUVIA/DCCV and started on Amiodarone: 400 mg bid x 14 days then decrease to 200 mg daily. Unfortunately his 1-week post-DCCV EKG revealed he was back in AF. He was instructed to finish the loading dose of amiodarone and then present for repeat DCCV.    Today, here for DCCV. NUVIA deferred as patient reports 100% compliance with eliquis. No acute complaints. Brother-in-law at bedside.    Rate/rhythm control: amiodarone 200mg daily  Anticoagulant/antiplatelets: eliquis 5mg BID, aspirin  ECG: AF, 68 bpm  Platelet count: 228  INR: 1.0    History of stroke:  no  Snoring:  no   Sleep Apnea:  no  History of anesthetic difficulties:  no  Family history of anesthetic difficulties:  no  Last oral intake: last pm   GLP-1 Use: no        Medications - Outpatient  Prior to Admission medications    Medication Sig Start Date End Date Taking? Authorizing Provider   amiodarone (PACERONE) 200 MG Tab Take 400 mg (2 tabs) TWICE daily for 14 days and then decrease to 200 mg (1 tab) ONCE daily 5/23/24   Lana Carrillo PA-C   apixaban (ELIQUIS) 5 mg Tab Take 1 tablet (5 mg total) by mouth 2 (two) times daily. 4/25/24 4/25/25  Jose A Ott,    aspirin 81 mg Cap Take by mouth.    Provider, Historical    cyanocobalamin, vitamin B-12, 2,500 mcg Tab Take 5,000 mcg by mouth once daily. 4/25/24   Jose A Ott DO   ergocalciferol (ERGOCALCIFEROL) 50,000 unit Cap TAKE 1 CAPSULE BY MOUTH EVERY 7 DAYS. THEN START DAILY OTC REPLACEMENT AFTER THIS RX IS COMPLETE 4/25/24   Jose A Ott DO   fluorouraciL (EFUDEX) 5 % cream Apply thin film to left lower forearm 2times per day for 3 weeks; d/c if area bleeding or ulcerated; avoid eyes or mouth 12/27/21   Ana Nicole MD   gentamicin (GARAMYCIN) 0.1 % ointment Apply topically. 12/16/21   Provider, Historical   ketoconazole (NIZORAL) 2 % cream AAA bid x 3 weeks 6/22/23   Jose A Ott DO   losartan (COZAAR) 100 MG tablet Take 1 tablet (100 mg total) by mouth every evening. For blood pressure. 4/25/24 4/25/25  Jose A Ott DO   lubiprostone (AMITIZA) 8 MCG Cap Take 1 capsule (8 mcg total) by mouth 2 (two) times daily. 7/25/23   Brando Morillo MD   rosuvastatin (CRESTOR) 5 MG tablet Take 1 tablet (5 mg total) by mouth once daily. For cholesterol 4/25/24   Jose A Ott DO         Medications - Current  Scheduled Meds:  Continuous Infusions:  PRN Meds:.      Allergies  Review of patient's allergies indicates:  No Known Allergies      Past Medical History  Past Medical History:   Diagnosis Date    Blood clot in vein     Right Leg, 2009    Diabetes mellitus     Diabetes mellitus, type 2     Gout flare     Hyperlipemia     Malignant melanoma of skin, unspecified     left arm    Prostate atrophy     Varicose veins          Past Surgical History  Past Surgical History:   Procedure Laterality Date    COLONOSCOPY N/A 10/13/2022    Procedure: COLONOSCOPY Ace;  Surgeon: Sacha Mead MD;  Location: [facility] ENDO;  Service: Endoscopy;  Laterality: N/A;    ECHOCARDIOGRAM,TRANSESOPHAGEAL N/A 5/23/2024    Procedure: Transesophageal echo (NUVIA) intra-procedure log documentation;  Surgeon: Jas Castellano MD;  Location: [facility] EP LAB;  Service: Cardiology;   Laterality: N/A;  AF, NUVIA/DCCV, MAC, SC, 3prep    TREATMENT OF CARDIAC ARRHYTHMIA N/A 2024    Procedure: Cardioversion or Defibrillation;  Surgeon: Sanchez Boucher MD;  Location: [facility] EP LAB;  Service: Cardiology;  Laterality: N/A;  AF, NUVIA/DCCV, MAC, SC, 3prep    VARICOSE VEIN SURGERY           Social History  Social History     Socioeconomic History    Marital status: Single   Tobacco Use    Smoking status: Former     Current packs/day: 0.00     Types: Cigarettes     Start date:      Quit date:      Years since quittin.4    Smokeless tobacco: Never   Substance and Sexual Activity    Alcohol use: Yes     Alcohol/week: 4.0 standard drinks of alcohol     Types: 4 Cans of beer per week     Comment: drinks beer, 2 cans, about 2x/week    Drug use: No    Sexual activity: Not Currently   Social History Narrative    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 No children. Two sisters, two brothers. One sister lives in [facility]. One sister lives in [place]. One dog at home.          ROS  Review of Systems   Constitutional: Negative for chills.   HENT: Negative.     Eyes: Negative.    Cardiovascular:  Negative for chest pain, dyspnea on exertion and palpitations.   Respiratory: Negative.  Negative for shortness of breath and sleep disturbances due to breathing.    Endocrine: Negative.    Musculoskeletal: Negative.    Gastrointestinal:  Negative for hematemesis, melena, nausea and vomiting.   Genitourinary: Negative.    Neurological: Negative.    Psychiatric/Behavioral: Negative.  Negative for altered mental status.    Allergic/Immunologic: Negative.    Physical Examination     Vital Signs  24 Hour VS Range    Temp:  [98.1 °F (36.7 °C)]   Pulse:  [65]   Resp:  [14]   BP: (168-178)/(102-104)   SpO2:  [96 %]       Physical Exam:   Physical Exam  Constitutional:       General: He is not in acute distress.     Appearance: Normal appearance.   HENT:      Head: Normocephalic.      Mouth/Throat:       "Mouth: Mucous membranes are moist.   Cardiovascular:      Rate and Rhythm: Tachycardia present. Rhythm irregular.   Pulmonary:      Effort: Pulmonary effort is normal.   Abdominal:      Palpations: Abdomen is soft.   Musculoskeletal:      Right lower leg: No edema.      Left lower leg: No edema.   Skin:     General: Skin is warm.   Neurological:      Mental Status: He is alert and oriented to person, place, and time.           Data       No results for input(s): "WBC", "HGB", "HCT", "PLT" in the last 168 hours.     No results for input(s): "PROTIME", "INR" in the last 168 hours.     No results for input(s): "NA", "K", "CL", "CO2", "BUN", "CREATININE", "ANIONGAP", "CALCIUM" in the last 168 hours.     No results for input(s): "PROT", "ALBUMIN", "BILITOT", "ALKPHOS", "AST", "ALT" in the last 168 hours.     No results for input(s): "TROPONINI" in the last 168 hours.     BNP (pg/mL)   Date Value   04/26/2013 20       No results for input(s): "LABBLOO" in the last 168 hours.         Assessment & Plan     #Atrial fibrillation  - proceed with DCCV  - NUVIA deferred as pt reports 100% compliance with eliquis      -The risks, benefits & alternatives of the procedure were explained to the patient.  -The risks of moderate sedation include hypotension, respiratory depression, arrhythmias, bronchospasm, & death.  -Prior to procedure, extensive discussion with patient regarding risks and benefits of DCCV at bedside today. The patient voices understanding, all questions have been answered, and patient would like to proceed.  -Informed consent was obtained. The patient is agreeable to proceed with the procedure and all questions and concerns addressed.    Case was discussed with an attending physician prior to procedure.    Rox Yap PA-C  [facility]   "

## 2024-07-02 ENCOUNTER — TELEPHONE (OUTPATIENT)
Dept: ELECTROPHYSIOLOGY | Facility: CLINIC | Age: 64
End: 2024-07-02
Payer: COMMERCIAL

## 2024-07-02 ENCOUNTER — HOSPITAL ENCOUNTER (OUTPATIENT)
Dept: CARDIOLOGY | Facility: CLINIC | Age: 64
Discharge: HOME | End: 2024-07-02
Payer: COMMERCIAL

## 2024-07-02 DIAGNOSIS — I48.91 NEW ONSET ATRIAL FIBRILLATION: ICD-10-CM

## 2024-07-02 LAB
OHS QRS DURATION: 108 MS
OHS QTC CALCULATION: 455 MS

## 2024-07-02 PROCEDURE — 93005 ELECTROCARDIOGRAM TRACING: CPT | Mod: S$GLB,,, | Performed by: INTERNAL MEDICINE

## 2024-07-02 PROCEDURE — 93010 ELECTROCARDIOGRAM REPORT: CPT | Mod: S$GLB,,, | Performed by: INTERNAL MEDICINE

## 2024-07-15 ENCOUNTER — TELEPHONE (OUTPATIENT)
Dept: ELECTROPHYSIOLOGY | Facility: CLINIC | Age: 64
End: 2024-07-15
Payer: COMMERCIAL

## 2024-07-16 ENCOUNTER — OFFICE VISIT (OUTPATIENT)
Dept: ELECTROPHYSIOLOGY | Facility: CLINIC | Age: 64
End: 2024-07-16
Payer: COMMERCIAL

## 2024-07-16 ENCOUNTER — HOSPITAL ENCOUNTER (OUTPATIENT)
Dept: CARDIOLOGY | Facility: CLINIC | Age: 64
Discharge: HOME | End: 2024-07-16
Payer: COMMERCIAL

## 2024-07-16 VITALS
HEIGHT: 78 IN | SYSTOLIC BLOOD PRESSURE: 144 MMHG | HEART RATE: 43 BPM | WEIGHT: 250 LBS | BODY MASS INDEX: 28.93 KG/M2 | DIASTOLIC BLOOD PRESSURE: 88 MMHG

## 2024-07-16 DIAGNOSIS — I48.91 NEW ONSET ATRIAL FIBRILLATION: Primary | ICD-10-CM

## 2024-07-16 DIAGNOSIS — I50.9 NEW ONSET OF CONGESTIVE HEART FAILURE: ICD-10-CM

## 2024-07-16 DIAGNOSIS — I10 ESSENTIAL HYPERTENSION: ICD-10-CM

## 2024-07-16 DIAGNOSIS — I48.91 NEW ONSET ATRIAL FIBRILLATION: ICD-10-CM

## 2024-07-16 LAB
OHS QRS DURATION: 112 MS
OHS QTC CALCULATION: 422 MS

## 2024-07-16 PROCEDURE — 99215 OFFICE O/P EST HI 40 MIN: CPT | Mod: S$GLB,,, | Performed by: INTERNAL MEDICINE

## 2024-07-16 PROCEDURE — 4010F ACE/ARB THERAPY RXD/TAKEN: CPT | Mod: CPTII,S$GLB,, | Performed by: INTERNAL MEDICINE

## 2024-07-16 PROCEDURE — 93005 ELECTROCARDIOGRAM TRACING: CPT | Mod: S$GLB,,, | Performed by: INTERNAL MEDICINE

## 2024-07-16 PROCEDURE — 3077F SYST BP >= 140 MM HG: CPT | Mod: CPTII,S$GLB,, | Performed by: INTERNAL MEDICINE

## 2024-07-16 PROCEDURE — 1159F MED LIST DOCD IN RCRD: CPT | Mod: CPTII,S$GLB,, | Performed by: INTERNAL MEDICINE

## 2024-07-16 PROCEDURE — 3044F HG A1C LEVEL LT 7.0%: CPT | Mod: CPTII,S$GLB,, | Performed by: INTERNAL MEDICINE

## 2024-07-16 PROCEDURE — 99999 PR PBB SHADOW E&M-EST. PATIENT-LVL III: CPT | Mod: PBBFAC,,, | Performed by: INTERNAL MEDICINE

## 2024-07-16 PROCEDURE — 3079F DIAST BP 80-89 MM HG: CPT | Mod: CPTII,S$GLB,, | Performed by: INTERNAL MEDICINE

## 2024-07-16 PROCEDURE — 1160F RVW MEDS BY RX/DR IN RCRD: CPT | Mod: CPTII,S$GLB,, | Performed by: INTERNAL MEDICINE

## 2024-07-16 PROCEDURE — 3008F BODY MASS INDEX DOCD: CPT | Mod: CPTII,S$GLB,, | Performed by: INTERNAL MEDICINE

## 2024-07-16 PROCEDURE — 93010 ELECTROCARDIOGRAM REPORT: CPT | Mod: S$GLB,,, | Performed by: INTERNAL MEDICINE

## 2024-07-16 NOTE — PROGRESS NOTES
Subjective   Patient ID:  Kevan Norris is a 63 y.o. male who presents for evaluation of Atrial Fibrillation    Referring Physician: Jose A Ott,     HPI  Prior Hx:  I had the pleasure of seeing Mr. Norris today in our electrophysiology clinic in consultation for his atrial fibrillation. As you are aware he is a pleasant 63 year-old man with hypertension, aortic atherosclerosis observed on prior CT scan, prior DVT treated with coumadin, microscopic hematuria, and IBS. He presented recently to Dr. Ott for annual follow-up. He was observed to be in atrial fibrillation. Eliquis was started. An ECHO is pending. Recent TSH was normal. He has no obvious symptoms. His main issue currently is recurrent diverticulitis.    4//2024: ECHO noted LVEF 30-35%    5/2024: NUVIA/DCCV, had early recurrence and underwent repeat DCCV after amiodarone loading.    Interim Hx:  Mr. Norris returns for follow-up. Maintaining sinus rhythm on amiodarone. Feels much better in sinus rhythm.    My interpretation of today's in-clinic ECG is sinus bradycardia with a rate of 43 bpm.      Review of Systems   Constitutional: Negative for fever and malaise/fatigue.   HENT:  Negative for congestion and sore throat.    Eyes:  Negative for blurred vision and visual disturbance.   Cardiovascular:  Negative for dyspnea on exertion, irregular heartbeat and near-syncope.   Respiratory:  Negative for cough.    Hematologic/Lymphatic: Negative for bleeding problem. Does not bruise/bleed easily.   Skin: Negative.    Musculoskeletal: Negative.    Gastrointestinal:  Negative for bloating, abdominal pain, hematochezia and melena.   Neurological:  Negative for focal weakness.   Psychiatric/Behavioral: Negative.            Objective     Physical Exam  Vitals reviewed.   Constitutional:       General: He is not in acute distress.     Appearance: He is well-developed. He is not diaphoretic.   HENT:      Head: Normocephalic and atraumatic.   Eyes:       General:         Right eye: No discharge.         Left eye: No discharge.      Conjunctiva/sclera: Conjunctivae normal.   Cardiovascular:      Rate and Rhythm: Regular rhythm. Bradycardia present.      Heart sounds: No murmur heard.     No friction rub. No gallop.   Pulmonary:      Effort: Pulmonary effort is normal. No respiratory distress.      Breath sounds: Normal breath sounds. No wheezing or rales.   Abdominal:      General: Bowel sounds are normal. There is no distension.      Palpations: Abdomen is soft.      Tenderness: There is no abdominal tenderness.   Musculoskeletal:      Cervical back: Neck supple.   Skin:     General: Skin is warm and dry.   Neurological:      Mental Status: He is alert and oriented to person, place, and time.   Psychiatric:         Behavior: Behavior normal.         Thought Content: Thought content normal.         Judgment: Judgment normal.            Assessment and Plan     1. New onset atrial fibrillation    2. Essential hypertension    3. New onset of congestive heart failure        Plan:  In summary, Mr. Norris is a pleasant 63 year-old man with hypertension, aortic atherosclerosis observed on prior CT scan, prior DVT treated with coumadin, microscopic hematuria, and IBS presenting for evaluation of newly recognized atrial fibrillation/systolic dysfunction. I had a long discussion with the patient about the pathophysiology and risks of atrial fibrillation and its basic pathophysiology, including its health implications and treatment options. Specifically, I addressed the need for CVA (stroke) prophylaxis with aspirin versus oral anticoagulation (warfarin vs DOACs, discussed bleeding risks, and need to come to the ER for any head trauma for CT scanning even if asymptomatic). [id number] score is 3 and oral anticoagulation is recommended. He is on eliquis. I also discussed the goal to reduce symptomatic arrhythmic episodes by pharmacologic and/or procedural methods and utilizing a  rhythm versus a rate control strategy. Strongly advise continued rhythm control. Now that he has been maintaining sinus rhythm recommend ECHO. If EF normalizing recommend ablation.     I spent about a half hour discussing the nature of PVI including transseptal puncture. We discussed risks and benefits at length. Our discussion included, but was not limited to the risk of death, infection, bleeding, stroke, MI, cardiac perforation, embolism, cardiac tamponade, vascular injury, valvular injury, AE fistula, injury to phrenic nerve, pulmonary vein stenosis and other organic injury including the possibility for need for surgery or pacemaker implantation.  Will discuss on the phone again after ECHO results.      Thank you for allowing me to participate in the care of this patient. Please do not hesitate to call me with any questions or concerns.    Sanchze Boucher MD, PhD  Cardiac Electrophysiology

## 2024-07-17 ENCOUNTER — OFFICE VISIT (OUTPATIENT)
Dept: FAMILY MEDICINE | Facility: CLINIC | Age: 64
End: 2024-07-17
Payer: COMMERCIAL

## 2024-07-17 ENCOUNTER — TELEPHONE (OUTPATIENT)
Dept: INTERNAL MEDICINE | Facility: CLINIC | Age: 64
End: 2024-07-17
Payer: COMMERCIAL

## 2024-07-17 VITALS
WEIGHT: 241.88 LBS | SYSTOLIC BLOOD PRESSURE: 90 MMHG | DIASTOLIC BLOOD PRESSURE: 70 MMHG | BODY MASS INDEX: 27.98 KG/M2 | HEIGHT: 78 IN | OXYGEN SATURATION: 96 % | TEMPERATURE: 97 F | HEART RATE: 62 BPM

## 2024-07-17 DIAGNOSIS — K57.90 DIVERTICULOSIS: ICD-10-CM

## 2024-07-17 DIAGNOSIS — I10 ESSENTIAL HYPERTENSION: Primary | ICD-10-CM

## 2024-07-17 DIAGNOSIS — I48.91 NEW ONSET ATRIAL FIBRILLATION: ICD-10-CM

## 2024-07-17 PROCEDURE — 3044F HG A1C LEVEL LT 7.0%: CPT | Mod: CPTII,S$GLB,, | Performed by: FAMILY MEDICINE

## 2024-07-17 PROCEDURE — 99999 PR PBB SHADOW E&M-EST. PATIENT-LVL IV: CPT | Mod: PBBFAC,,, | Performed by: FAMILY MEDICINE

## 2024-07-17 PROCEDURE — 3078F DIAST BP <80 MM HG: CPT | Mod: CPTII,S$GLB,, | Performed by: FAMILY MEDICINE

## 2024-07-17 PROCEDURE — 99214 OFFICE O/P EST MOD 30 MIN: CPT | Mod: S$GLB,,, | Performed by: FAMILY MEDICINE

## 2024-07-17 PROCEDURE — 4010F ACE/ARB THERAPY RXD/TAKEN: CPT | Mod: CPTII,S$GLB,, | Performed by: FAMILY MEDICINE

## 2024-07-17 PROCEDURE — 3008F BODY MASS INDEX DOCD: CPT | Mod: CPTII,S$GLB,, | Performed by: FAMILY MEDICINE

## 2024-07-17 PROCEDURE — 3074F SYST BP LT 130 MM HG: CPT | Mod: CPTII,S$GLB,, | Performed by: FAMILY MEDICINE

## 2024-07-17 PROCEDURE — 1159F MED LIST DOCD IN RCRD: CPT | Mod: CPTII,S$GLB,, | Performed by: FAMILY MEDICINE

## 2024-07-17 RX ORDER — LOSARTAN POTASSIUM 50 MG/1
50 TABLET ORAL NIGHTLY
Qty: 90 TABLET | Refills: 1 | Status: SHIPPED | OUTPATIENT
Start: 2024-07-17 | End: 2025-07-17

## 2024-07-17 NOTE — TELEPHONE ENCOUNTER
----- Message from Sanchez Boucher MD sent at 7/17/2024  4:24 PM CDT -----  Willam Mccarthy a bother. Unless coronary artery disease is identified I don't think aspirin is necessary.    Sanchez  ----- Message -----  From: Jose A Ott DO  Sent: 7/17/2024   1:31 PM CDT  To: Sanchez Boucher MD    Hi Dr. Boucher,   Sorry to bother you  I saw your note, but I wanted to clarify. Do you want patient to continue eliquis and aspirin?    Thank you!    JM

## 2024-07-17 NOTE — PROGRESS NOTES
"Subjective:       Patient ID: Kevan Norris is a 63 y.o. male.    Chief Complaint: GI Problem    Kevan is a 63 y.o. male who presents today for f/u    PAF: on amiodarone and eliquis. Had cardioversion. Considering ablation. Seeing EP.   HTN: on coreg and losartan. BP low. Not checking at home.   DLD: on crestor.   On aspirin. Has never had a MI or stroke. Was on this for history of DVT.     Diverticulitis: seeing colorectal. He reports taking miralax, that has been helping more then metamucil. Amitiza was unaffordable. Sitz marker study was normal. Needs f/u with colorectal. No abdominal pain. C-scope was normal 2022.       Review of Systems   Constitutional:  Negative for chills and fever.   Respiratory:  Negative for shortness of breath.    Cardiovascular:  Negative for chest pain and palpitations.   Gastrointestinal:  Negative for abdominal pain, constipation, diarrhea, nausea and vomiting.   Neurological:  Negative for dizziness, syncope, light-headedness and headaches.             Objective:     Vitals:    07/17/24 1306 07/17/24 1344   BP: 98/60 90/70   BP Location: Left arm    Patient Position: Sitting    BP Method: Large (Manual)    Pulse: 62    Temp: 97.2 °F (36.2 °C)    SpO2: 96%    Weight: 109.7 kg (241 lb 13.5 oz)    Height: 6' 7" (2.007 m)         Physical Exam  Constitutional:       General: He is not in acute distress.     Appearance: He is not ill-appearing, toxic-appearing or diaphoretic.   Cardiovascular:      Rate and Rhythm: Normal rate and regular rhythm.      Comments: Appears to be RRR  Pulmonary:      Effort: Pulmonary effort is normal. No respiratory distress.      Breath sounds: Normal breath sounds. No wheezing.   Abdominal:      General: There is no distension.      Palpations: Abdomen is soft.      Tenderness: There is no abdominal tenderness. There is no guarding or rebound.   Musculoskeletal:         General: No swelling.   Neurological:      General: No focal deficit present. " Labs ordered and signed.  Please schedule.        Mental Status: He is alert.   Psychiatric:         Mood and Affect: Mood normal.         Behavior: Behavior normal.         Thought Content: Thought content normal.         Judgment: Judgment normal.         Assessment:       1. Essential hypertension    2. Diverticulosis in the sigmoid colon, in the desending colon, and the transverse colon    3. New onset atrial fibrillation        Plan:       ECHO pending with EP  Continue amiodraone  Continue eliquis 5 mg bid  Continue coreg BID    BP low today. Decrease losartan to 50 mg. Likely due to coreg  Goal BP: 130/80 or less.     Continue b12  Continue vitamin D    F/u with colorectal? May not be needed if symptoms are better with miralax  Continue daily miralax  If symptoms worsen or another episode of diverticulitis occurs, will need to f/u with colorectal. Repeat c-scope?    Stop aspirin. On eliquis. May restart if eliquis is stopped?   Will confirm EP is okay with this    F/u as scheduled.         Essential hypertension  -     losartan (COZAAR) 50 MG tablet; Take 1 tablet (50 mg total) by mouth every evening. For blood pressure.  Dispense: 90 tablet; Refill: 1    Diverticulosis in the sigmoid colon, in the desending colon, and the transverse colon    New onset atrial fibrillation

## 2024-07-17 NOTE — Clinical Note
Willam Boucher,  Sorry to bother you I saw your note, but I wanted to clarify. Do you want patient to continue eliquis and aspirin?  Thank you!  JM

## 2024-07-17 NOTE — PATIENT INSTRUCTIONS
ECHO pending with EP  Continue amiodraone  Continue eliquis 5 mg bid  Continue coreg BID    BP low today. Decrease losartan to 50 mg. Likely due to coreg  Goal BP: 130/80 or less.     Continue b12  Continue vitamin D    F/u with colorectal? May not be needed if symptoms are better with miralax  Continue daily miralax  If symptoms worsen or another episode of diverticulitis occurs, will need to f/u with colorectal. Repeat c-scope?    Stop aspirin. On eliquis. May restart if eliquis is stopped?   Will confirm EP is okay with this    F/u as scheduled.

## 2024-07-18 ENCOUNTER — HOSPITAL ENCOUNTER (OUTPATIENT)
Dept: CARDIOLOGY | Facility: HOSPITAL | Age: 64
Discharge: HOME | End: 2024-07-18
Attending: INTERNAL MEDICINE
Payer: COMMERCIAL

## 2024-07-18 VITALS — BODY MASS INDEX: 27.88 KG/M2 | WEIGHT: 241 LBS | HEIGHT: 78 IN

## 2024-07-18 DIAGNOSIS — I50.9 NEW ONSET OF CONGESTIVE HEART FAILURE: ICD-10-CM

## 2024-07-18 DIAGNOSIS — I48.91 NEW ONSET ATRIAL FIBRILLATION: ICD-10-CM

## 2024-07-18 LAB
ASCENDING AORTA: 3.82 CM
AV INDEX (PROSTH): 0.84
AV MEAN GRADIENT: 9 MMHG
AV PEAK GRADIENT: 15 MMHG
AV VALVE AREA BY VELOCITY RATIO: 3.77 CM²
AV VALVE AREA: 3.41 CM²
AV VELOCITY RATIO: 0.92
BSA FOR ECHO PROCEDURE: 2.47 M2
CV ECHO LV RWT: 0.29 CM
DOP CALC AO PEAK VEL: 1.94 M/S
DOP CALC AO VTI: 44.44 CM
DOP CALC LVOT AREA: 4.1 CM2
DOP CALC LVOT DIAMETER: 2.28 CM
DOP CALC LVOT PEAK VEL: 1.79 M/S
DOP CALC LVOT STROKE VOLUME: 151.48 CM3
DOP CALCLVOT PEAK VEL VTI: 37.12 CM
E WAVE DECELERATION TIME: 292.26 MSEC
E/A RATIO: 0.84
E/E' RATIO: 6.71 M/S
ECHO LV POSTERIOR WALL: 0.9 CM (ref 0.6–1.1)
FRACTIONAL SHORTENING: 30 % (ref 28–44)
INTERVENTRICULAR SEPTUM: 0.86 CM (ref 0.6–1.1)
LA MAJOR: 6.83 CM
LA MINOR: 6.55 CM
LA WIDTH: 5.06 CM
LEFT ATRIUM SIZE: 4.92 CM
LEFT ATRIUM VOLUME INDEX MOD: 52.6 ML/M2
LEFT ATRIUM VOLUME INDEX: 57.3 ML/M2
LEFT ATRIUM VOLUME MOD: 130.01 CM3
LEFT ATRIUM VOLUME: 141.5 CM3
LEFT INTERNAL DIMENSION IN SYSTOLE: 4.41 CM (ref 2.1–4)
LEFT VENTRICLE DIASTOLIC VOLUME INDEX: 80.79 ML/M2
LEFT VENTRICLE DIASTOLIC VOLUME: 199.55 ML
LEFT VENTRICLE MASS INDEX: 92 G/M2
LEFT VENTRICLE SYSTOLIC VOLUME INDEX: 35.6 ML/M2
LEFT VENTRICLE SYSTOLIC VOLUME: 88.03 ML
LEFT VENTRICULAR INTERNAL DIMENSION IN DIASTOLE: 6.28 CM (ref 3.5–6)
LEFT VENTRICULAR MASS: 226.94 G
LV LATERAL E/E' RATIO: 6.71 M/S
LV SEPTAL E/E' RATIO: 6.71 M/S
MV PEAK A VEL: 0.56 M/S
MV PEAK E VEL: 0.47 M/S
MV STENOSIS PRESSURE HALF TIME: 84.76 MS
MV VALVE AREA P 1/2 METHOD: 2.6 CM2
OHS LV EJECTION FRACTION SIMPSONS BIPLANE MOD: 58 %
PISA TR MAX VEL: 2.6 M/S
RA MAJOR: 6.75 CM
RA PRESSURE ESTIMATED: 3 MMHG
RA WIDTH: 5.17 CM
RIGHT VENTRICLE DIASTOLIC BASEL DIMENSION: 4.9 CM
RV TB RVSP: 6 MMHG
SINUS: 3.56 CM
STJ: 2.96 CM
TDI LATERAL: 0.07 M/S
TDI SEPTAL: 0.07 M/S
TDI: 0.07 M/S
TR MAX PG: 27 MMHG
TRICUSPID ANNULAR PLANE SYSTOLIC EXCURSION: 2.29 CM
TV REST PULMONARY ARTERY PRESSURE: 30 MMHG
Z-SCORE OF LEFT VENTRICULAR DIMENSION IN END DIASTOLE: -7.2
Z-SCORE OF LEFT VENTRICULAR DIMENSION IN END SYSTOLE: -4.38

## 2024-07-18 PROCEDURE — 93306 TTE W/DOPPLER COMPLETE: CPT

## 2024-07-18 PROCEDURE — 93306 TTE W/DOPPLER COMPLETE: CPT | Mod: 26,,, | Performed by: INTERNAL MEDICINE

## 2024-07-19 ENCOUNTER — TELEPHONE (OUTPATIENT)
Dept: ELECTROPHYSIOLOGY | Facility: CLINIC | Age: 64
End: 2024-07-19
Payer: COMMERCIAL

## 2024-07-19 NOTE — TELEPHONE ENCOUNTER
Spoke with patient regarding his ECHO. His LV function has recovered to normal with maintaining sinus rhythm. Discussed in this situation he should consider PVI.  We discussed risks and benefits at length. Our discussion included, but was not limited to the risk of death, infection, bleeding, stroke, MI, cardiac perforation, embolism, cardiac tamponade, vascular injury, valvular injury, AE fistula, injury to phrenic nerve,  and other organic injury including the possibility for need for surgery.    He understood and desires to proceed.    Plan  RF-PVI  Carto  Anesthesia  NUVIA day of cancel if in sinus rhythm  Hold eliquis AM of procedure

## 2024-07-30 RX ORDER — DABIGATRAN ETEXILATE 150 MG/1
150 CAPSULE ORAL 2 TIMES DAILY
Qty: 60 CAPSULE | Refills: 11 | Status: CANCELLED | OUTPATIENT
Start: 2024-07-30

## 2024-08-05 ENCOUNTER — TELEPHONE (OUTPATIENT)
Dept: ELECTROPHYSIOLOGY | Facility: CLINIC | Age: 64
End: 2024-08-05
Payer: COMMERCIAL

## 2024-08-09 ENCOUNTER — TELEPHONE (OUTPATIENT)
Dept: ELECTROPHYSIOLOGY | Facility: CLINIC | Age: 64
End: 2024-08-09
Payer: COMMERCIAL

## 2024-08-13 ENCOUNTER — TELEPHONE (OUTPATIENT)
Dept: ELECTROPHYSIOLOGY | Facility: CLINIC | Age: 64
End: 2024-08-13
Payer: COMMERCIAL

## 2024-08-13 DIAGNOSIS — Z01.818 PRE-OP TESTING: ICD-10-CM

## 2024-08-13 DIAGNOSIS — I50.9 NEW ONSET OF CONGESTIVE HEART FAILURE: ICD-10-CM

## 2024-08-13 DIAGNOSIS — Z79.01 WARFARIN THERAPY STARTED: ICD-10-CM

## 2024-08-13 DIAGNOSIS — Z86.718 HISTORY OF DVT (DEEP VEIN THROMBOSIS): ICD-10-CM

## 2024-08-13 DIAGNOSIS — I48.91 NEW ONSET ATRIAL FIBRILLATION: Primary | ICD-10-CM

## 2024-08-13 DIAGNOSIS — Z79.01 CHRONIC ANTICOAGULATION: ICD-10-CM

## 2024-08-13 DIAGNOSIS — I10 ESSENTIAL HYPERTENSION: ICD-10-CM

## 2024-08-13 RX ORDER — WARFARIN SODIUM 5 MG/1
5 TABLET ORAL DAILY
Qty: 30 TABLET | Refills: 11 | Status: SHIPPED | OUTPATIENT
Start: 2024-08-13 | End: 2025-08-13

## 2024-08-13 NOTE — TELEPHONE ENCOUNTER
Attempted to return call to patient x 2 attempts to discuss medications (Eliquis) but no answer received and no voicemail available to leave a message.     Third attempt to reach patient successful. Patient states that he is aware that I was working on getting a less expensive medication for him as was notified by his insurance that a PA and appeal for Pradaxa was completed and denied. At this time patient has a two day supply of Eliquis left, and agrees to take warfarin if ok with Dr Boucher. Patient previously treated for DVT with warfarin.

## 2024-08-13 NOTE — TELEPHONE ENCOUNTER
----- Message from Sanchez Boucher MD sent at 8/13/2024  3:27 PM CDT -----  That is fine. Start 5mg daily and enroll in coumadin clinic with goal INR 2-3  ----- Message -----  From: Jaz Fernandez RN  Sent: 8/13/2024   3:08 PM CDT  To: Sanchez Boucher MD    Patient is unable to afford Eliquis, and has been denied for Pradaxa. Willing to take warfarin if ok to do so.

## 2024-08-14 ENCOUNTER — ANTI-COAG VISIT (OUTPATIENT)
Dept: CARDIOLOGY | Facility: CLINIC | Age: 64
End: 2024-08-14
Payer: COMMERCIAL

## 2024-08-14 DIAGNOSIS — Z79.01 LONG TERM (CURRENT) USE OF ANTICOAGULANTS: Primary | ICD-10-CM

## 2024-08-14 DIAGNOSIS — I48.0 PAROXYSMAL ATRIAL FIBRILLATION: ICD-10-CM

## 2024-08-14 PROBLEM — I48.91 ATRIAL FIBRILLATION: Status: ACTIVE | Noted: 2024-08-14

## 2024-08-14 NOTE — PROGRESS NOTES
64 y/o M w/ hx PAF (s/p cardioversion 6/2024), HTN, dyslipidemia & hx DVT.  Pt continues on amio 200mg daily.  Transitioned from Eliquis.

## 2024-08-15 NOTE — PROGRESS NOTES
Spoke to patient regarding enrollment into the Coumadin Clinic. Patient verified that he has a peach 5 mg warfarin tablet, and advised on a warfarin regimen of 5 mg qPM. INR scheduled for 08.19.2024.    Patient educated on diet and when to call. Patient will eat foods with vitamin K daily, and drink EtOH on the weekends. The risks associated with consuming EtOH, while taking warfarin, and the importance of a consistent diet discussed. Questions and concerns addressed at this time, and patient expressed understanding.

## 2024-08-19 ENCOUNTER — LAB VISIT (OUTPATIENT)
Dept: LAB | Facility: HOSPITAL | Age: 64
End: 2024-08-19
Attending: INTERNAL MEDICINE
Payer: COMMERCIAL

## 2024-08-19 ENCOUNTER — ANTI-COAG VISIT (OUTPATIENT)
Dept: CARDIOLOGY | Facility: CLINIC | Age: 64
End: 2024-08-19
Payer: COMMERCIAL

## 2024-08-19 DIAGNOSIS — Z79.01 LONG TERM (CURRENT) USE OF ANTICOAGULANTS: ICD-10-CM

## 2024-08-19 DIAGNOSIS — Z79.01 LONG TERM (CURRENT) USE OF ANTICOAGULANTS: Primary | ICD-10-CM

## 2024-08-19 DIAGNOSIS — I48.0 PAROXYSMAL ATRIAL FIBRILLATION: ICD-10-CM

## 2024-08-19 LAB
INR PPP: 1.1 (ref 0.8–1.2)
PROTHROMBIN TIME: 11.9 SEC (ref 9–12.5)

## 2024-08-19 PROCEDURE — 85610 PROTHROMBIN TIME: CPT | Performed by: INTERNAL MEDICINE

## 2024-08-19 PROCEDURE — 36415 COLL VENOUS BLD VENIPUNCTURE: CPT | Performed by: INTERNAL MEDICINE

## 2024-08-19 PROCEDURE — 93793 ANTICOAG MGMT PT WARFARIN: CPT | Mod: S$GLB,,,

## 2024-08-19 NOTE — PROGRESS NOTES
[facility]    2024 10:13 AM    Assessment/Plan:    Patient presents today with subtherapeutic  INR.    Assessment of patient findings and chart review: INR remains at baseline.     Recommendation for patient's warfarin regimen: Increase per calendar     Recommend repeat INR Thursday  _________________________________________________________________    Kevan L Jr (63 y.o.) is followed by the [facility].    Anticoagulation Summary  As of 2024      INR goal:  2.0-3.0   TTR:  --   INR used for dosin.1 (2024)   Warfarin maintenance plan:  No maintenance plan   Plan last modified:  Gissell Camargo, Marcie (2024)   Next INR check:  2024   Target end date:  Indefinite    Indications    Long term (current) use of anticoagulants [Z79.01]  Atrial fibrillation [I48.91]                 Anticoagulation Episode Summary       INR check location:  Clinic Lab    Preferred lab:      Send INR reminders to:  [facility] COUMADIN MONITORING POOL    Comments:  [facility]          Anticoagulation Care Providers       Provider Role Specialty Phone number    Sanchez Boucher MD Responsible Electrophysiology [phone]

## 2024-08-22 ENCOUNTER — LAB VISIT (OUTPATIENT)
Dept: LAB | Facility: HOSPITAL | Age: 64
End: 2024-08-22
Attending: INTERNAL MEDICINE
Payer: COMMERCIAL

## 2024-08-22 ENCOUNTER — ANTI-COAG VISIT (OUTPATIENT)
Dept: CARDIOLOGY | Facility: CLINIC | Age: 64
End: 2024-08-22
Payer: COMMERCIAL

## 2024-08-22 DIAGNOSIS — Z79.01 LONG TERM (CURRENT) USE OF ANTICOAGULANTS: ICD-10-CM

## 2024-08-22 DIAGNOSIS — I48.0 PAROXYSMAL ATRIAL FIBRILLATION: ICD-10-CM

## 2024-08-22 DIAGNOSIS — Z79.01 LONG TERM (CURRENT) USE OF ANTICOAGULANTS: Primary | ICD-10-CM

## 2024-08-22 LAB
INR PPP: 1.3 (ref 0.8–1.2)
PROTHROMBIN TIME: 13.5 SEC (ref 9–12.5)

## 2024-08-22 PROCEDURE — 85610 PROTHROMBIN TIME: CPT | Performed by: INTERNAL MEDICINE

## 2024-08-22 PROCEDURE — 36415 COLL VENOUS BLD VENIPUNCTURE: CPT | Performed by: INTERNAL MEDICINE

## 2024-08-22 PROCEDURE — 93793 ANTICOAG MGMT PT WARFARIN: CPT | Mod: S$GLB,,,

## 2024-08-22 NOTE — PROGRESS NOTES
[facility]    2024 11:01 AM    Assessment/Plan:    Patient presents today with subtherapeutic  INR.    Assessment of patient findings and chart review: INR remains at baseline after being on warfarin for ~8 days.  Movement expected at this point.     Recommendation for patient's warfarin regimen: Increase maintenance dose    Recommend repeat INR Monday.   _________________________________________________________________    Kevan Norris (63 y.o.) is followed by the [facility].    Anticoagulation Summary  As of 2024      INR goal:  2.0-3.0   TTR:  --   INR used for dosin.3 (2024)   Warfarin maintenance plan:  No maintenance plan   Plan last modified:  Gissell Camargo, PharmD (2024)   Next INR check:     Target end date:  Indefinite    Indications    Long term (current) use of anticoagulants [Z79.01]  Atrial fibrillation [I48.91]                 Anticoagulation Episode Summary       INR check location:  Clinic Lab    Preferred lab:      Send INR reminders to:  [facility] COUMADIN MONITORING POOL    Comments:  [facility]          Anticoagulation Care Providers       Provider Role Specialty Phone number    Sanchez Boucher MD Responsible Electrophysiology [phone]

## 2024-08-29 ENCOUNTER — ANTI-COAG VISIT (OUTPATIENT)
Dept: CARDIOLOGY | Facility: CLINIC | Age: 64
End: 2024-08-29
Payer: COMMERCIAL

## 2024-08-29 ENCOUNTER — LAB VISIT (OUTPATIENT)
Dept: LAB | Facility: HOSPITAL | Age: 64
End: 2024-08-29
Attending: INTERNAL MEDICINE
Payer: COMMERCIAL

## 2024-08-29 DIAGNOSIS — Z79.01 LONG TERM (CURRENT) USE OF ANTICOAGULANTS: ICD-10-CM

## 2024-08-29 DIAGNOSIS — Z79.01 LONG TERM (CURRENT) USE OF ANTICOAGULANTS: Primary | ICD-10-CM

## 2024-08-29 DIAGNOSIS — I48.0 PAROXYSMAL ATRIAL FIBRILLATION: ICD-10-CM

## 2024-08-29 LAB
INR PPP: 2.3 (ref 0.8–1.2)
PROTHROMBIN TIME: 23.5 SEC (ref 9–12.5)

## 2024-08-29 PROCEDURE — 36415 COLL VENOUS BLD VENIPUNCTURE: CPT | Performed by: INTERNAL MEDICINE

## 2024-08-29 PROCEDURE — 93793 ANTICOAG MGMT PT WARFARIN: CPT | Mod: S$GLB,,,

## 2024-08-29 PROCEDURE — 85610 PROTHROMBIN TIME: CPT | Performed by: INTERNAL MEDICINE

## 2024-08-29 NOTE — PROGRESS NOTES
[facility]    2024 10:39 AM    Assessment/Plan:    Patient presents today with therapeutic INR.    Assessment of patient findings and chart review: Pt's INR has good movement.     Recommendation for patient's warfarin regimen: Continue current maintenance dose    Recommend repeat INR Tuesday   _________________________________________________________________    Kevan ALFONSO Jr (63 y.o.) is followed by the [facility].    Anticoagulation Summary  As of 2024      INR goal:  2.0-3.0   TTR:  32.9% (6 d)   INR used for dosin.3 (2024)   Warfarin maintenance plan:  7.5 mg (5 mg x 1.5) every day   Weekly warfarin total:  52.5 mg   Plan last modified:  Gissell Camargo, PharmD (2024)   Next INR check:  9/3/2024   Target end date:  Indefinite    Indications    Long term (current) use of anticoagulants [Z79.01]  Atrial fibrillation [I48.91]                 Anticoagulation Episode Summary       INR check location:  Clinic Lab    Preferred lab:      Send INR reminders to:  [facility] COUMADIN MONITORING POOL    Comments:  [facility]          Anticoagulation Care Providers       Provider Role Specialty Phone number    Sanchez Boucher MD Responsible Electrophysiology [phone]

## 2024-09-03 ENCOUNTER — TELEPHONE (OUTPATIENT)
Dept: CARDIOLOGY | Facility: CLINIC | Age: 64
End: 2024-09-03
Payer: COMMERCIAL

## 2024-09-03 ENCOUNTER — ANTI-COAG VISIT (OUTPATIENT)
Dept: CARDIOLOGY | Facility: CLINIC | Age: 64
End: 2024-09-03
Payer: COMMERCIAL

## 2024-09-03 ENCOUNTER — LAB VISIT (OUTPATIENT)
Dept: LAB | Facility: HOSPITAL | Age: 64
End: 2024-09-03
Attending: INTERNAL MEDICINE
Payer: COMMERCIAL

## 2024-09-03 DIAGNOSIS — I48.0 PAROXYSMAL ATRIAL FIBRILLATION: ICD-10-CM

## 2024-09-03 DIAGNOSIS — Z79.01 LONG TERM (CURRENT) USE OF ANTICOAGULANTS: Primary | ICD-10-CM

## 2024-09-03 DIAGNOSIS — Z79.01 LONG TERM (CURRENT) USE OF ANTICOAGULANTS: ICD-10-CM

## 2024-09-03 LAB
INR PPP: 2.3 (ref 0.8–1.2)
PROTHROMBIN TIME: 23.9 SEC (ref 9–12.5)

## 2024-09-03 PROCEDURE — 36415 COLL VENOUS BLD VENIPUNCTURE: CPT | Performed by: INTERNAL MEDICINE

## 2024-09-03 PROCEDURE — 93793 ANTICOAG MGMT PT WARFARIN: CPT | Mod: S$GLB,,,

## 2024-09-03 PROCEDURE — 85610 PROTHROMBIN TIME: CPT | Performed by: INTERNAL MEDICINE

## 2024-09-03 NOTE — TELEPHONE ENCOUNTER
----- Message from Gusclement Ceballos sent at 9/3/2024  2:16 PM CDT -----  Contact: pt  Type:  Patient Returning Call    Who Called:PT   Who Left Message for Patient:Cindy Landis MA  Does the patient know what this is regarding?:YES  Would the patient rather a call back or a response via [facility]? Call  Best Call Back Number: [phone]  Additional Information:

## 2024-09-03 NOTE — LETTER
September 3, 2024    Kevan Norris  [address] [address]             [place] - Cardiology  [address]    [facility] [address]  Phone: [phone] Dear: Mr. Kevan Norris,    Our efforts to reach you by telephone have been unsuccessful regarding an appointment you scheduled with Dr Martin. Your appointment which was scheduled for 9/17/2024 has been moved to 9/24/2024 at 11:20 am because the provider will not be available at this time. Please contact us at [phone] to reschedule this appointment.    We apologize for any inconvenience this may have caused you and appreciate your continued support of [facility].    Sincerely,  Cindy Landis  [place] - CARDIOLOGY

## 2024-09-03 NOTE — PROGRESS NOTES
[facility]    2024 11:15 AM    Assessment/Plan:    Patient presents today with therapeutic INR.    Assessment of patient findings and chart review: New pt continues to be WNL.  Will continue current regimen.  If pt remains in range will extend f/u.     Recommendation for patient's warfarin regimen: Continue current maintenance dose    Recommend repeat INR in 1 week  _________________________________________________________________    Kevan L Jr (63 y.o.) is followed by the [facility].    Anticoagulation Summary  As of 9/3/2024      INR goal:  2.0-3.0   TTR:  62.4% (1.6 wk)   INR used for dosin.3 (9/3/2024)   Warfarin maintenance plan:  7.5 mg (5 mg x 1.5) every day   Weekly warfarin total:  52.5 mg   Plan last modified:  Gissell Camargo, PharmD (2024)   Next INR check:  9/10/2024   Target end date:  Indefinite    Indications    Long term (current) use of anticoagulants [Z79.01]  Atrial fibrillation [I48.91]                 Anticoagulation Episode Summary       INR check location:  Clinic Lab    Preferred lab:      Send INR reminders to:  [facility] COUMADIN MONITORING POOL    Comments:  [facility]          Anticoagulation Care Providers       Provider Role Specialty Phone number    Sanchez Boucher MD Responsible Electrophysiology [phone]

## 2024-09-03 NOTE — TELEPHONE ENCOUNTER
Tried calling to reschedule 9/17 appointment with Dr Martin. No answer and no voicemail to leave message. Letter mailed.

## 2024-09-10 ENCOUNTER — ANTI-COAG VISIT (OUTPATIENT)
Dept: CARDIOLOGY | Facility: CLINIC | Age: 64
End: 2024-09-10
Payer: COMMERCIAL

## 2024-09-10 ENCOUNTER — LAB VISIT (OUTPATIENT)
Dept: LAB | Facility: HOSPITAL | Age: 64
End: 2024-09-10
Attending: INTERNAL MEDICINE
Payer: COMMERCIAL

## 2024-09-10 DIAGNOSIS — Z79.01 LONG TERM (CURRENT) USE OF ANTICOAGULANTS: ICD-10-CM

## 2024-09-10 DIAGNOSIS — I48.0 PAROXYSMAL ATRIAL FIBRILLATION: ICD-10-CM

## 2024-09-10 DIAGNOSIS — Z79.01 LONG TERM (CURRENT) USE OF ANTICOAGULANTS: Primary | ICD-10-CM

## 2024-09-10 LAB
INR PPP: 2.5 (ref 0.8–1.2)
PROTHROMBIN TIME: 25.4 SEC (ref 9–12.5)

## 2024-09-10 PROCEDURE — 85610 PROTHROMBIN TIME: CPT | Performed by: INTERNAL MEDICINE

## 2024-09-10 PROCEDURE — 36415 COLL VENOUS BLD VENIPUNCTURE: CPT | Performed by: INTERNAL MEDICINE

## 2024-09-10 PROCEDURE — 93793 ANTICOAG MGMT PT WARFARIN: CPT | Mod: S$GLB,,,

## 2024-09-10 NOTE — PROGRESS NOTES
[facility]    09/10/2024 10:04 AM    Assessment/Plan:    Patient presents today with therapeutic INR.    Assessment of patient findings and chart review: New patient and continues to have therapeutic INRs. No changes noted to necessitate adjustment to warfarin regimen. Will extend f/u interval.     Recommendation for patient's warfarin regimen: Continue current maintenance dose    Recommend repeat INR in 2 weeks  _________________________________________________________________    Kevan Norris (63 y.o.) is followed by the [facility].    Anticoagulation Summary  As of 9/10/2024      INR goal:  2.0-3.0   TTR:  76.8% (2.6 wk)   INR used for dosin.5 (9/10/2024)   Warfarin maintenance plan:  7.5 mg (5 mg x 1.5) every day   Weekly warfarin total:  52.5 mg   Plan last modified:  Gissell Camargo, PharmD (2024)   Next INR check:  2024   Target end date:  Indefinite    Indications    Long term (current) use of anticoagulants [Z79.01]  Atrial fibrillation [I48.91]                 Anticoagulation Episode Summary       INR check location:  Clinic Lab    Preferred lab:      Send INR reminders to:  [facility] COUMADIN MONITORING POOL    Comments:  [facility]          Anticoagulation Care Providers       Provider Role Specialty Phone number    Sanchez Boucher MD Responsible Electrophysiology [phone]

## 2024-09-13 ENCOUNTER — TELEPHONE (OUTPATIENT)
Dept: ELECTROPHYSIOLOGY | Facility: CLINIC | Age: 64
End: 2024-09-13
Payer: COMMERCIAL

## 2024-09-13 NOTE — TELEPHONE ENCOUNTER
----- Message from Paola Wilson MA sent at 9/3/2024  3:47 PM CDT -----  Contact: pt    ----- Message -----  From: Gus Ceballos  Sent: 9/3/2024   2:21 PM CDT  To: Sander SHUKLA Staff    Type: Requesting to speak with nurse        Who Called: PT  Regarding: would like to speak to office about his appointment   Would the patient rather a call back or a response via [facility]? Call back  Best Call Back Number:  [phone]  Additional Information:

## 2024-09-24 ENCOUNTER — LAB VISIT (OUTPATIENT)
Dept: LAB | Facility: HOSPITAL | Age: 64
End: 2024-09-24
Attending: INTERNAL MEDICINE
Payer: COMMERCIAL

## 2024-09-24 ENCOUNTER — ANTI-COAG VISIT (OUTPATIENT)
Dept: CARDIOLOGY | Facility: CLINIC | Age: 64
End: 2024-09-24
Payer: COMMERCIAL

## 2024-09-24 DIAGNOSIS — Z79.01 LONG TERM (CURRENT) USE OF ANTICOAGULANTS: Primary | ICD-10-CM

## 2024-09-24 DIAGNOSIS — I48.0 PAROXYSMAL ATRIAL FIBRILLATION: ICD-10-CM

## 2024-09-24 DIAGNOSIS — Z79.01 LONG TERM (CURRENT) USE OF ANTICOAGULANTS: ICD-10-CM

## 2024-09-24 LAB
INR PPP: 3.2 (ref 0.8–1.2)
PROTHROMBIN TIME: 32.7 SEC (ref 9–12.5)

## 2024-09-24 PROCEDURE — 85610 PROTHROMBIN TIME: CPT | Performed by: INTERNAL MEDICINE

## 2024-09-24 PROCEDURE — 36415 COLL VENOUS BLD VENIPUNCTURE: CPT | Performed by: INTERNAL MEDICINE

## 2024-09-24 PROCEDURE — 93793 ANTICOAG MGMT PT WARFARIN: CPT | Mod: S$GLB,,,

## 2024-09-24 NOTE — PROGRESS NOTES
[facility]    09/24/2024 11:48 AM    Assessment/Plan:    Patient presents today with supratherapeutic INR.    Assessment of patient findings and chart review: Pt has upcoming ablation.     Recommendation for patient's warfarin regimen: Lower dose today to 5mg then resume current maintenance dose    Recommend repeat INR in 2 weeks  _________________________________________________________________    Kevan ALFONSO Jr (64 y.o.) is followed by the [facility].    Anticoagulation Summary  As of 9/24/2024      INR goal:  2.0-3.0   TTR:  74.5% (1.1 mo)   INR used for dosing:  3.2 (9/24/2024)   Warfarin maintenance plan:  7.5 mg (5 mg x 1.5) every day   Weekly warfarin total:  52.5 mg   Plan last modified:  Gissell Camargo, PharmD (8/29/2024)   Next INR check:  10/9/2024   Target end date:  Indefinite    Indications    Long term (current) use of anticoagulants [Z79.01]  Atrial fibrillation [I48.91]                 Anticoagulation Episode Summary       INR check location:  Clinic Lab    Preferred lab:      Send INR reminders to:  [facility] COUMADIN MONITORING POOL    Comments:  [facility]          Anticoagulation Care Providers       Provider Role Specialty Phone number    Sanchez Boucher MD Responsible Electrophysiology [phone]

## 2024-09-26 ENCOUNTER — TELEPHONE (OUTPATIENT)
Dept: PHARMACY | Facility: CLINIC | Age: 64
End: 2024-09-26
Payer: COMMERCIAL

## 2024-09-26 NOTE — TELEPHONE ENCOUNTER
[facility]Population Health Chart Review & Patient Outreach Details For Medication Adherence Project    Reason for Outreach Encounter: 3rd Party payor non-compliance report ([employer], [employer], [employer], etc)  2.  Patient Outreach Method: Reviewed patient chart   3.   Medication in question:   Hypertension Medications               carvediloL (COREG) 6.25 MG tablet Take 1 tablet (6.25 mg total) by mouth 2 (two) times daily with meals.    losartan (COZAAR) 50 MG tablet Take 1 tablet (50 mg total) by mouth every evening. For blood pressure.               LAST FILLED: 7/17/24 for 90 day supply  4.  Reviewed and or Updates Made To: Patient Chart  5. Outreach Outcomes and/or actions taken: Patient filled medication and is on track to be adherent  Additional Notes:

## 2024-10-09 ENCOUNTER — LAB VISIT (OUTPATIENT)
Dept: LAB | Facility: HOSPITAL | Age: 64
End: 2024-10-09
Attending: INTERNAL MEDICINE
Payer: MEDICARE

## 2024-10-09 DIAGNOSIS — Z79.01 CHRONIC ANTICOAGULATION: ICD-10-CM

## 2024-10-09 DIAGNOSIS — I10 ESSENTIAL HYPERTENSION: ICD-10-CM

## 2024-10-09 DIAGNOSIS — Z86.718 HISTORY OF DVT (DEEP VEIN THROMBOSIS): ICD-10-CM

## 2024-10-09 DIAGNOSIS — Z01.818 PRE-OP TESTING: ICD-10-CM

## 2024-10-09 DIAGNOSIS — I50.9 NEW ONSET OF CONGESTIVE HEART FAILURE: ICD-10-CM

## 2024-10-09 DIAGNOSIS — I48.91 NEW ONSET ATRIAL FIBRILLATION: ICD-10-CM

## 2024-10-09 LAB
ANION GAP SERPL CALC-SCNC: 9 MMOL/L (ref 8–16)
APTT PPP: 43.6 SEC (ref 21–32)
BASOPHILS # BLD AUTO: 0.07 K/UL (ref 0–0.2)
BASOPHILS NFR BLD: 1.2 % (ref 0–1.9)
BUN SERPL-MCNC: 17 MG/DL (ref 8–23)
CALCIUM SERPL-MCNC: 8.7 MG/DL (ref 8.7–10.5)
CHLORIDE SERPL-SCNC: 105 MMOL/L (ref 95–110)
CO2 SERPL-SCNC: 29 MMOL/L (ref 23–29)
CREAT SERPL-MCNC: 0.9 MG/DL (ref 0.5–1.4)
DIFFERENTIAL METHOD BLD: ABNORMAL
EOSINOPHIL # BLD AUTO: 0.3 K/UL (ref 0–0.5)
EOSINOPHIL NFR BLD: 5 % (ref 0–8)
ERYTHROCYTE [DISTWIDTH] IN BLOOD BY AUTOMATED COUNT: 14 % (ref 11.5–14.5)
EST. GFR  (NO RACE VARIABLE): >60 ML/MIN/1.73 M^2
GLUCOSE SERPL-MCNC: 84 MG/DL (ref 70–110)
HCT VFR BLD AUTO: 43.9 % (ref 40–54)
HGB BLD-MCNC: 13.9 G/DL (ref 14–18)
IMM GRANULOCYTES # BLD AUTO: 0.01 K/UL (ref 0–0.04)
IMM GRANULOCYTES NFR BLD AUTO: 0.2 % (ref 0–0.5)
INR PPP: 2.3 (ref 0.8–1.2)
LYMPHOCYTES # BLD AUTO: 1.3 K/UL (ref 1–4.8)
LYMPHOCYTES NFR BLD: 21.9 % (ref 18–48)
MCH RBC QN AUTO: 30.2 PG (ref 27–31)
MCHC RBC AUTO-ENTMCNC: 31.7 G/DL (ref 32–36)
MCV RBC AUTO: 95 FL (ref 82–98)
MONOCYTES # BLD AUTO: 0.6 K/UL (ref 0.3–1)
MONOCYTES NFR BLD: 9.7 % (ref 4–15)
NEUTROPHILS # BLD AUTO: 3.6 K/UL (ref 1.8–7.7)
NEUTROPHILS NFR BLD: 62 % (ref 38–73)
NRBC BLD-RTO: 0 /100 WBC
PLATELET # BLD AUTO: 229 K/UL (ref 150–450)
PMV BLD AUTO: 10.7 FL (ref 9.2–12.9)
POTASSIUM SERPL-SCNC: 4.4 MMOL/L (ref 3.5–5.1)
PROTHROMBIN TIME: 24 SEC (ref 9–12.5)
RBC # BLD AUTO: 4.6 M/UL (ref 4.6–6.2)
SODIUM SERPL-SCNC: 143 MMOL/L (ref 136–145)
WBC # BLD AUTO: 5.75 K/UL (ref 3.9–12.7)

## 2024-10-09 PROCEDURE — 85610 PROTHROMBIN TIME: CPT | Performed by: INTERNAL MEDICINE

## 2024-10-09 PROCEDURE — 80048 BASIC METABOLIC PNL TOTAL CA: CPT | Performed by: INTERNAL MEDICINE

## 2024-10-09 PROCEDURE — 36415 COLL VENOUS BLD VENIPUNCTURE: CPT | Mod: PO | Performed by: INTERNAL MEDICINE

## 2024-10-09 PROCEDURE — 85025 COMPLETE CBC W/AUTO DIFF WBC: CPT | Performed by: INTERNAL MEDICINE

## 2024-10-09 PROCEDURE — 85730 THROMBOPLASTIN TIME PARTIAL: CPT | Performed by: INTERNAL MEDICINE

## 2024-10-10 ENCOUNTER — ANTI-COAG VISIT (OUTPATIENT)
Dept: CARDIOLOGY | Facility: CLINIC | Age: 64
End: 2024-10-10
Payer: MEDICARE

## 2024-10-10 DIAGNOSIS — Z79.01 LONG TERM (CURRENT) USE OF ANTICOAGULANTS: Primary | ICD-10-CM

## 2024-10-10 PROCEDURE — 93793 ANTICOAG MGMT PT WARFARIN: CPT | Mod: ,,,

## 2024-10-10 NOTE — PROGRESS NOTES
[facility]    10/10/2024 8:56 AM    Assessment/Plan:    Patient presents today with therapeutic INR.    Assessment of patient findings and chart review: Reviewed.  Pt has upcoming ablation & MD requests INR prior to procedure.     Recommendation for patient's warfarin regimen: Continue current maintenance dose    Recommend repeat INR in 1 week  _________________________________________________________________    Kevan ALFONSO Norris (64 y.o.) is followed by the [facility].    Anticoagulation Summary  As of 10/10/2024      INR goal:  2.0-3.0   TTR:  75.5% (1.6 mo)   INR used for dosin.3 (10/9/2024)   Warfarin maintenance plan:  7.5 mg (5 mg x 1.5) every day   Weekly warfarin total:  52.5 mg   Plan last modified:  Gissell Camargo, PharmD (2024)   Next INR check:  10/14/2024   Target end date:  Indefinite    Indications    Long term (current) use of anticoagulants [Z79.01]  Atrial fibrillation [I48.91]                 Anticoagulation Episode Summary       INR check location:  Clinic Lab    Preferred lab:  --    Send INR reminders to:  [facility] COUMADIN MONITORING POOL    Comments:  [facility]          Anticoagulation Care Providers       Provider Role Specialty Phone number    Sanchez Boucher MD Responsible Electrophysiology [phone]

## 2024-10-11 ENCOUNTER — OFFICE VISIT (OUTPATIENT)
Dept: CARDIOLOGY | Facility: CLINIC | Age: 64
End: 2024-10-11
Payer: MEDICARE

## 2024-10-11 VITALS
WEIGHT: 246.38 LBS | HEART RATE: 60 BPM | OXYGEN SATURATION: 96 % | HEIGHT: 78 IN | SYSTOLIC BLOOD PRESSURE: 127 MMHG | DIASTOLIC BLOOD PRESSURE: 85 MMHG | BODY MASS INDEX: 28.51 KG/M2

## 2024-10-11 DIAGNOSIS — I83.812 VARICOSE VEINS OF LEFT LOWER EXTREMITY WITH PAIN: ICD-10-CM

## 2024-10-11 DIAGNOSIS — I87.2 VENOUS (PERIPHERAL) INSUFFICIENCY: Primary | ICD-10-CM

## 2024-10-11 DIAGNOSIS — Z79.01 LONG TERM (CURRENT) USE OF ANTICOAGULANTS: ICD-10-CM

## 2024-10-11 DIAGNOSIS — I48.0 PAROXYSMAL ATRIAL FIBRILLATION: ICD-10-CM

## 2024-10-11 DIAGNOSIS — I70.0 AORTIC ATHEROSCLEROSIS: ICD-10-CM

## 2024-10-11 DIAGNOSIS — Z86.718 HISTORY OF DVT (DEEP VEIN THROMBOSIS): ICD-10-CM

## 2024-10-11 DIAGNOSIS — I10 ESSENTIAL HYPERTENSION: ICD-10-CM

## 2024-10-11 PROCEDURE — 99999 PR PBB SHADOW E&M-EST. PATIENT-LVL III: CPT | Mod: PBBFAC,,, | Performed by: STUDENT IN AN ORGANIZED HEALTH CARE EDUCATION/TRAINING PROGRAM

## 2024-10-11 PROCEDURE — 99214 OFFICE O/P EST MOD 30 MIN: CPT | Mod: S$PBB,,, | Performed by: STUDENT IN AN ORGANIZED HEALTH CARE EDUCATION/TRAINING PROGRAM

## 2024-10-11 PROCEDURE — 99213 OFFICE O/P EST LOW 20 MIN: CPT | Mod: PBBFAC,PN | Performed by: STUDENT IN AN ORGANIZED HEALTH CARE EDUCATION/TRAINING PROGRAM

## 2024-10-11 NOTE — PROGRESS NOTES
Subjective:   @Patient ID:  Kevan Norris is a 64 y.o. male who presents for evaluation of No chief complaint on file.      HPI:   Mr Norris is pleasant 63 year-old man with venous insufficiency, hypertension, Afib, aortic atherosclerosis observed on prior CT scan, prior DVT treated with coumadin, microscopic hematuria, and IBS  here for follow up. Reprots he is tolerating meds w/o s/e. LE swelling but not using compression stockings. Ablation scheduled. Denies cp, sob, palpitations, presyncope/dizziness, syncope, orthopnea, PND, bendopnea, decrease ET, NVDC, fever, chills.    Ablation scheduled: 10/16    CEAP C4 right leg  CEAP C6 left leg      US LEV insu. 05/2024      There is no evidence of a right lower extremity DVT.    There is no evidence of a left lower extremity DVT.    The right greater saphenous vein has reflux.    The right femoral vein and right popliteal have significant reflux.  Very dilated veins    The left superficial femoral middle vein has reflux.    The left greater saphenous vein has reflux.    Severe bilateral varicosities with significant reflux    Significant superficial and deep reflux noted           Patient Active Problem List    Diagnosis Date Noted    Long term (current) use of anticoagulants 08/14/2024    Atrial fibrillation 08/14/2024    New onset of congestive heart failure 07/16/2024    Aortic atherosclerosis 04/26/2024    New onset atrial fibrillation 04/25/2024    Diverticulosis in the sigmoid colon, in the desending colon, and the transverse colon 12/15/2022    Venous insufficiency of lower extremity 06/15/2022    Asymptomatic microscopic hematuria 12/05/2021    Vitamin D deficiency 12/05/2021    Anemia due to vitamin B12 deficiency 12/05/2021    Other hyperlipidemia 12/02/2021    Chronic gout without tophus 12/02/2021    GERD without esophagitis 12/02/2021    Essential hypertension 12/02/2021    BMI 27.0-27.9,adult 12/02/2021    History of DVT (deep vein thrombosis)  "12/02/2021    Varicose veins of left lower extremity with pain 12/02/2021    Umbilical hernia without obstruction and without gangrene 12/02/2021                    LABS  CBC  @LABRCNTIP(WBC:3,RBC:3,HGB:3,HCT:3,PLT:3,MCV:3,MCH:3,MCHC:3)@  BMP  @LABRCNTIP(NA:3,K:3,CO2:3,CL:3,BUN:3,Creatinine:3,GLU:3,GFR:3)@    POCT-Glucose  No results found for: "POCTGLUCOSE"    @LABRCNTIP(Calcium:3,MG:3,PHOS:3)@  LFT  @LABRCNTIP(PROT:3,Albumin:3,,Bilitot:3,AST:3,Alkphos:3,ALT:3)@  GFR     COAGS  @LABRCNTIP(PT:3,INR:3,APTT:3)@  CE  @LABRCNTIP(troponini:3,cktotal:3,ckmb:3)@  ABGs  @[id number](PH,PCO2,PO2,HCO3,POCSATURATED,BE)@  BNP  @LABRCNTIP(BNP:3)@    LAST HbA1c  Lab Results   Component Value Date    HGBA1C 5.4 04/23/2024       Lipid panel  Lab Results   Component Value Date    CHOL 161 04/23/2024    CHOL 141 12/13/2022    CHOL 173 06/07/2022     Lab Results   Component Value Date    HDL 52 04/23/2024    HDL 44 12/13/2022    HDL 39 (L) 06/07/2022     Lab Results   Component Value Date    LDLCALC 91.0 04/23/2024    LDLCALC 86.2 12/13/2022    LDLCALC 96.4 06/07/2022     Lab Results   Component Value Date    TRIG 90 04/23/2024    TRIG 54 12/13/2022    TRIG 188 (H) 06/07/2022     Lab Results   Component Value Date    CHOLHDL 32.3 04/23/2024    CHOLHDL 31.2 12/13/2022    CHOLHDL 22.5 06/07/2022            Review of Systems   All other systems reviewed and are negative.      Objective:   General: No acute stress  HENT: EOM intact, PERRL, oropharynx clear, mucous membranes clear and moist   Pulmonary: CTAB  Cardiovascular: IRIR  Abdomen: soft, non-tender, no distended no splenomegaly or hepatomegaly   Skin: warm, dry, no erythema, no rashes    Extremities: Edema (change in color)  Neuro: a/ox4, clear speech, follows commands, no weakness or focal neurologic  deficit   Psych: mood and behavior normal       Assessment:     1. Venous (peripheral) insufficiency    2. Paroxysmal atrial fibrillation    3. Essential hypertension    4. History " of DVT (deep vein thrombosis)    5. Varicose veins of left lower extremity with pain    6. Aortic atherosclerosis    7. Long term (current) use of anticoagulants          Plan:   Afib--> continue OAC and rate controlled (ECG was 115 bpm but he has been walking- during my PE HR decreased to 70-80s,  usually HR 70s)  HTN- continue current medications  DVT- continue OAC  PVD-CEAP 4-5. Currently not using compression stocking asymptomatic. Will reassess the need of intervention after ablation. For now compression stocking reordered.   HLD continue statin   Target BP < 130/80 mmHg    Continue with current medical plan and lifestyle changes.  Compression stocking 20-30 for venous insufficiency if no improvement  Elevate legs while sitting  If there is no improvement she will have an insufficiency ultrasound for further evaluation     Weight loss  Exercise    Continue with current medical plan and lifestyle changes.  Return sooner for concerns or questions. If symptoms persist go to the ED  I have reviewed all pertinent data on this patient       She expressed verbal understanding and agreed with the plan      Follow up as scheduled. Return sooner for concerns or questions      I have reviewed the patient's medical history in detail and updated the computerized patient record.    Orders Placed This Encounter   Procedures    COMPRESSION STOCKINGS     Order Specific Question:   Pressure amount:     Answer:   20-30 mmHg       Follow up as scheduled. Return sooner for concerns or questions            He expressed verbal understanding and agreed with the plan        Patient's Medications   New Prescriptions    No medications on file   Previous Medications    AMIODARONE (PACERONE) 200 MG TAB    Take 400 mg (2 tabs) TWICE daily for 14 days and then decrease to 200 mg (1 tab) ONCE daily    CARVEDILOL (COREG) 6.25 MG TABLET    Take 1 tablet (6.25 mg total) by mouth 2 (two) times daily with meals.    CYANOCOBALAMIN, VITAMIN B-12,  2,500 MCG TAB    Take 5,000 mcg by mouth once daily.    ERGOCALCIFEROL (ERGOCALCIFEROL) 50,000 UNIT CAP    TAKE 1 CAPSULE BY MOUTH EVERY 7 DAYS. THEN START DAILY OTC REPLACEMENT AFTER THIS RX IS COMPLETE    FLUOROURACIL (EFUDEX) 5 % CREAM    Apply thin film to left lower forearm 2times per day for 3 weeks; d/c if area bleeding or ulcerated; avoid eyes or mouth    GENTAMICIN (GARAMYCIN) 0.1 % OINTMENT    Apply topically.    KETOCONAZOLE (NIZORAL) 2 % CREAM    AAA bid x 3 weeks    LOSARTAN (COZAAR) 50 MG TABLET    Take 1 tablet (50 mg total) by mouth every evening. For blood pressure.    ROSUVASTATIN (CRESTOR) 5 MG TABLET    Take 1 tablet (5 mg total) by mouth once daily. For cholesterol    WARFARIN (COUMADIN) 5 MG TABLET    Take 1 tablet (5 mg total) by mouth Daily.   Modified Medications    No medications on file   Discontinued Medications    No medications on file        Dima Martin MD  Cardiovascular Disease [facility]

## 2024-10-14 ENCOUNTER — TELEPHONE (OUTPATIENT)
Dept: ELECTROPHYSIOLOGY | Facility: CLINIC | Age: 64
End: 2024-10-14
Payer: MEDICARE

## 2024-10-14 ENCOUNTER — ANTI-COAG VISIT (OUTPATIENT)
Dept: CARDIOLOGY | Facility: CLINIC | Age: 64
End: 2024-10-14
Payer: MEDICARE

## 2024-10-14 ENCOUNTER — LAB VISIT (OUTPATIENT)
Dept: LAB | Facility: HOSPITAL | Age: 64
End: 2024-10-14
Attending: INTERNAL MEDICINE
Payer: MEDICARE

## 2024-10-14 DIAGNOSIS — I48.0 PAROXYSMAL ATRIAL FIBRILLATION: ICD-10-CM

## 2024-10-14 DIAGNOSIS — Z79.01 LONG TERM (CURRENT) USE OF ANTICOAGULANTS: ICD-10-CM

## 2024-10-14 DIAGNOSIS — Z79.01 LONG TERM (CURRENT) USE OF ANTICOAGULANTS: Primary | ICD-10-CM

## 2024-10-14 LAB
INR PPP: 1.5 (ref 0.8–1.2)
PROTHROMBIN TIME: 16.2 SEC (ref 9–12.5)

## 2024-10-14 PROCEDURE — 85610 PROTHROMBIN TIME: CPT | Performed by: INTERNAL MEDICINE

## 2024-10-14 PROCEDURE — 93793 ANTICOAG MGMT PT WARFARIN: CPT | Mod: ,,,

## 2024-10-14 PROCEDURE — 36415 COLL VENOUS BLD VENIPUNCTURE: CPT | Performed by: INTERNAL MEDICINE

## 2024-10-14 NOTE — TELEPHONE ENCOUNTER
Spoke to patient    CONFIRMED procedure arrival time of 10 am    Reiterated instructions including:    -Directions to check in desk    -NPO after midnight night prior to procedure. Fasting upon arrival to the hospital the day of the procedure.     -High importance of HOLDING Amiodarone 14 days prior to procedure (last dose on 10/1/24); holding Losartan on day procedure (last dose on 10/15/24)    - Confirms no new meds prescribed or med changes since scheduling -     -Pre-procedure LABS Reviewed; INR 1.5 on 10/14/24 to be repeated STAT on day of procedure. Per Dr. Boucher, if he shows up under 2 the option will be to cancel and reschedule or admit after and give an IV blood thinner until his INR is 2 or greater.     -Confirmed absence or presence of implanted device/stimulator  n/a    -Confirmed no recent or current fever, cough, or shortness of breath .    -Confirmed no redness, rash, irritation, or yeast infection to skin/ chest / groin area.       Patient verbalized understanding of above, denies any further questions and appreciated the call.

## 2024-10-14 NOTE — PROGRESS NOTES
[facility]    10/14/2024 2:04 PM    Assessment/Plan:    Patient presents today with subtherapeutic  INR.    Assessment of patient findings and chart review: Pt subtherapeutic 2/2 to missing dose.    Recommendation for patient's warfarin regimen: Boost dose today to 10mg then resume current maintenance dose    Recommend repeat INR in 1 week  _________________________________________________________________    Kevan ALFONSO Jr (64 y.o.) is followed by the [facility].    Anticoagulation Summary  As of 10/14/2024      INR goal:  2.0-3.0   TTR:  71.8% (1.7 mo)   INR used for dosin.5 (10/14/2024)   Warfarin maintenance plan:  7.5 mg (5 mg x 1.5) every day   Weekly warfarin total:  52.5 mg   Plan last modified:  Gissell Camargo, PharmD (2024)   Next INR check:  --   Target end date:  Indefinite    Indications    Long term (current) use of anticoagulants [Z79.01]  Atrial fibrillation [I48.91]                 Anticoagulation Episode Summary       INR check location:  Clinic Lab    Preferred lab:  --    Send INR reminders to:  [facility] COUMADIN MONITORING POOL    Comments:  [facility]          Anticoagulation Care Providers       Provider Role Specialty Phone number    Sanchez Boucher MD Responsible Electrophysiology [phone]

## 2024-10-15 ENCOUNTER — TELEPHONE (OUTPATIENT)
Dept: ELECTROPHYSIOLOGY | Facility: CLINIC | Age: 64
End: 2024-10-15
Payer: MEDICARE

## 2024-10-15 NOTE — TELEPHONE ENCOUNTER
Call received from patient directly from the  , with patient stating that he will have to cancel his procedure due to not having a ride tomorrow. Patient thought that he had to be accompanied by someone to the hospital, therefore was going to cancel, but he was advised that he can Uber to the hospital. Patient was also concerned about his procedure being approved by the insurance. Advised that it appears that it has been approved, and number to the [facility] financial counselors provided to call regarding out of pocket cost. Patient requested that I leave him scheduled as is for tomorrow.

## 2024-10-16 ENCOUNTER — ANESTHESIA EVENT (OUTPATIENT)
Dept: MEDSURG UNIT | Facility: HOSPITAL | Age: 64
End: 2024-10-16
Payer: MEDICARE

## 2024-10-16 ENCOUNTER — ANESTHESIA (OUTPATIENT)
Dept: MEDSURG UNIT | Facility: HOSPITAL | Age: 64
End: 2024-10-16
Payer: MEDICARE

## 2024-10-16 ENCOUNTER — HOSPITAL ENCOUNTER (OUTPATIENT)
Facility: HOSPITAL | Age: 64
Discharge: HOME | End: 2024-10-17
Attending: INTERNAL MEDICINE | Admitting: INTERNAL MEDICINE
Payer: MEDICARE

## 2024-10-16 ENCOUNTER — HOSPITAL ENCOUNTER (OUTPATIENT)
Dept: CARDIOLOGY | Facility: HOSPITAL | Age: 64
Discharge: HOME | End: 2024-10-16
Attending: INTERNAL MEDICINE
Payer: MEDICARE

## 2024-10-16 DIAGNOSIS — I48.91 A-FIB: ICD-10-CM

## 2024-10-16 DIAGNOSIS — I48.91 NEW ONSET ATRIAL FIBRILLATION: ICD-10-CM

## 2024-10-16 DIAGNOSIS — I10 ESSENTIAL HYPERTENSION: ICD-10-CM

## 2024-10-16 DIAGNOSIS — I48.91 ATRIAL FIBRILLATION: ICD-10-CM

## 2024-10-16 DIAGNOSIS — Z86.718 HISTORY OF DVT (DEEP VEIN THROMBOSIS): ICD-10-CM

## 2024-10-16 DIAGNOSIS — I49.9 ARRHYTHMIA: ICD-10-CM

## 2024-10-16 DIAGNOSIS — I50.9 NEW ONSET OF CONGESTIVE HEART FAILURE: ICD-10-CM

## 2024-10-16 LAB
APTT PPP: 36.5 SEC (ref 21–32)
INR PPP: 1.4 (ref 0.8–1.2)
OHS QRS DURATION: 112 MS
OHS QRS DURATION: 96 MS
OHS QTC CALCULATION: 432 MS
OHS QTC CALCULATION: 475 MS
POC ACTIVATED CLOTTING TIME K: 152 SEC (ref 74–137)
POC ACTIVATED CLOTTING TIME K: 171 SEC (ref 74–137)
POC ACTIVATED CLOTTING TIME K: 336 SEC (ref 74–137)
POC ACTIVATED CLOTTING TIME K: 348 SEC (ref 74–137)
POC ACTIVATED CLOTTING TIME K: 360 SEC (ref 74–137)
POC ACTIVATED CLOTTING TIME K: 367 SEC (ref 74–137)
POC ACTIVATED CLOTTING TIME K: 391 SEC (ref 74–137)
POCT GLUCOSE: 131 MG/DL (ref 70–110)
POCT GLUCOSE: 98 MG/DL (ref 70–110)
PROTHROMBIN TIME: 15 SEC (ref 9–12.5)
SAMPLE: ABNORMAL

## 2024-10-16 PROCEDURE — C1753 CATH, INTRAVAS ULTRASOUND: HCPCS | Performed by: INTERNAL MEDICINE

## 2024-10-16 PROCEDURE — 85610 PROTHROMBIN TIME: CPT | Performed by: NURSE PRACTITIONER

## 2024-10-16 PROCEDURE — 63600175 PHARM REV CODE 636 W HCPCS: Performed by: NURSE ANESTHETIST, CERTIFIED REGISTERED

## 2024-10-16 PROCEDURE — 93656 COMPRE EP EVAL ABLTJ ATR FIB: CPT | Performed by: INTERNAL MEDICINE

## 2024-10-16 PROCEDURE — 93010 ELECTROCARDIOGRAM REPORT: CPT | Mod: ,,, | Performed by: INTERNAL MEDICINE

## 2024-10-16 PROCEDURE — 93005 ELECTROCARDIOGRAM TRACING: CPT

## 2024-10-16 PROCEDURE — 93657 TX L/R ATRIAL FIB ADDL: CPT | Performed by: INTERNAL MEDICINE

## 2024-10-16 PROCEDURE — 93657 TX L/R ATRIAL FIB ADDL: CPT | Mod: GC,,, | Performed by: INTERNAL MEDICINE

## 2024-10-16 PROCEDURE — 85730 THROMBOPLASTIN TIME PARTIAL: CPT | Performed by: NURSE PRACTITIONER

## 2024-10-16 PROCEDURE — 82962 GLUCOSE BLOOD TEST: CPT | Performed by: INTERNAL MEDICINE

## 2024-10-16 PROCEDURE — C2630 CATH, EP, COOL-TIP: HCPCS | Performed by: INTERNAL MEDICINE

## 2024-10-16 PROCEDURE — 63600175 PHARM REV CODE 636 W HCPCS: Performed by: INTERNAL MEDICINE

## 2024-10-16 PROCEDURE — 37000008 HC ANESTHESIA 1ST 15 MINUTES: Performed by: INTERNAL MEDICINE

## 2024-10-16 PROCEDURE — 93656 COMPRE EP EVAL ABLTJ ATR FIB: CPT | Mod: GC,,, | Performed by: INTERNAL MEDICINE

## 2024-10-16 PROCEDURE — C1730 CATH, EP, 19 OR FEW ELECT: HCPCS | Performed by: INTERNAL MEDICINE

## 2024-10-16 PROCEDURE — 27201423 OPTIME MED/SURG SUP & DEVICES STERILE SUPPLY: Performed by: INTERNAL MEDICINE

## 2024-10-16 PROCEDURE — 25000003 PHARM REV CODE 250: Performed by: STUDENT IN AN ORGANIZED HEALTH CARE EDUCATION/TRAINING PROGRAM

## 2024-10-16 PROCEDURE — C1894 INTRO/SHEATH, NON-LASER: HCPCS | Performed by: INTERNAL MEDICINE

## 2024-10-16 PROCEDURE — 25000003 PHARM REV CODE 250: Performed by: NURSE ANESTHETIST, CERTIFIED REGISTERED

## 2024-10-16 PROCEDURE — C1766 INTRO/SHEATH,STRBLE,NON-PEEL: HCPCS | Performed by: INTERNAL MEDICINE

## 2024-10-16 PROCEDURE — 37000009 HC ANESTHESIA EA ADD 15 MINS: Performed by: INTERNAL MEDICINE

## 2024-10-16 RX ORDER — AMIODARONE HYDROCHLORIDE 200 MG/1
200 TABLET ORAL DAILY
Status: DISCONTINUED | OUTPATIENT
Start: 2024-10-17 | End: 2024-10-17 | Stop reason: HOSPADM

## 2024-10-16 RX ORDER — HEPARIN SOD,PORCINE/0.9 % NACL 1000/500ML
INTRAVENOUS SOLUTION INTRAVENOUS
Status: DISCONTINUED | OUTPATIENT
Start: 2024-10-16 | End: 2024-10-17 | Stop reason: HOSPADM

## 2024-10-16 RX ORDER — LIDOCAINE HYDROCHLORIDE 20 MG/ML
INJECTION, SOLUTION INFILTRATION; PERINEURAL
Status: DISCONTINUED | OUTPATIENT
Start: 2024-10-16 | End: 2024-10-17 | Stop reason: HOSPADM

## 2024-10-16 RX ORDER — KETAMINE HCL IN 0.9 % NACL 50 MG/5 ML
SYRINGE (ML) INTRAVENOUS
Status: DISCONTINUED | OUTPATIENT
Start: 2024-10-16 | End: 2024-10-16

## 2024-10-16 RX ORDER — ACETAMINOPHEN 325 MG/1
650 TABLET ORAL EVERY 4 HOURS PRN
Status: DISCONTINUED | OUTPATIENT
Start: 2024-10-16 | End: 2024-10-17 | Stop reason: HOSPADM

## 2024-10-16 RX ORDER — SODIUM CHLORIDE 0.9 % (FLUSH) 0.9 %
3 SYRINGE (ML) INJECTION
Status: DISCONTINUED | OUTPATIENT
Start: 2024-10-16 | End: 2024-10-17 | Stop reason: HOSPADM

## 2024-10-16 RX ORDER — PHENYLEPHRINE HYDROCHLORIDE 10 MG/ML
INJECTION INTRAVENOUS
Status: DISCONTINUED | OUTPATIENT
Start: 2024-10-16 | End: 2024-10-16

## 2024-10-16 RX ORDER — HEPARIN SODIUM 10000 [USP'U]/100ML
INJECTION, SOLUTION INTRAVENOUS CONTINUOUS PRN
Status: DISCONTINUED | OUTPATIENT
Start: 2024-10-16 | End: 2024-10-16

## 2024-10-16 RX ORDER — IBUPROFEN 200 MG
16 TABLET ORAL
Status: DISCONTINUED | OUTPATIENT
Start: 2024-10-16 | End: 2024-10-17 | Stop reason: HOSPADM

## 2024-10-16 RX ORDER — PROTAMINE SULFATE 10 MG/ML
INJECTION, SOLUTION INTRAVENOUS
Status: DISCONTINUED | OUTPATIENT
Start: 2024-10-16 | End: 2024-10-16

## 2024-10-16 RX ORDER — DABIGATRAN ETEXILATE 150 MG/1
150 CAPSULE ORAL 2 TIMES DAILY
Status: DISCONTINUED | OUTPATIENT
Start: 2024-10-16 | End: 2024-10-16

## 2024-10-16 RX ORDER — DABIGATRAN ETEXILATE 150 MG/1
150 CAPSULE ORAL 2 TIMES DAILY
Status: DISCONTINUED | OUTPATIENT
Start: 2024-10-16 | End: 2024-10-17 | Stop reason: HOSPADM

## 2024-10-16 RX ORDER — MIDAZOLAM HYDROCHLORIDE 1 MG/ML
INJECTION INTRAMUSCULAR; INTRAVENOUS
Status: DISCONTINUED | OUTPATIENT
Start: 2024-10-16 | End: 2024-10-16

## 2024-10-16 RX ORDER — PANTOPRAZOLE SODIUM 40 MG/1
40 TABLET, DELAYED RELEASE ORAL DAILY
Qty: 30 TABLET | Refills: 0 | Status: SHIPPED | OUTPATIENT
Start: 2024-10-16 | End: 2025-10-16

## 2024-10-16 RX ORDER — PANTOPRAZOLE SODIUM 40 MG/1
40 TABLET, DELAYED RELEASE ORAL DAILY
Status: DISCONTINUED | OUTPATIENT
Start: 2024-10-16 | End: 2024-10-17 | Stop reason: HOSPADM

## 2024-10-16 RX ORDER — DABIGATRAN ETEXILATE 150 MG/1
150 CAPSULE ORAL 2 TIMES DAILY
Qty: 60 CAPSULE | Refills: 2 | Status: SHIPPED | OUTPATIENT
Start: 2024-10-16 | End: 2025-01-14

## 2024-10-16 RX ORDER — GLUCAGON 1 MG
1 KIT INJECTION
Status: DISCONTINUED | OUTPATIENT
Start: 2024-10-16 | End: 2024-10-17 | Stop reason: HOSPADM

## 2024-10-16 RX ORDER — AMIODARONE HYDROCHLORIDE 200 MG/1
200 TABLET ORAL DAILY
Status: DISCONTINUED | OUTPATIENT
Start: 2024-10-16 | End: 2024-10-16

## 2024-10-16 RX ORDER — CARVEDILOL 3.12 MG/1
6.25 TABLET ORAL 2 TIMES DAILY WITH MEALS
Status: DISCONTINUED | OUTPATIENT
Start: 2024-10-16 | End: 2024-10-16

## 2024-10-16 RX ORDER — IBUPROFEN 200 MG
24 TABLET ORAL
Status: DISCONTINUED | OUTPATIENT
Start: 2024-10-16 | End: 2024-10-17 | Stop reason: HOSPADM

## 2024-10-16 RX ORDER — SUCRALFATE 1 G/1
1 TABLET ORAL
Status: DISCONTINUED | OUTPATIENT
Start: 2024-10-16 | End: 2024-10-17 | Stop reason: HOSPADM

## 2024-10-16 RX ORDER — INSULIN ASPART 100 [IU]/ML
0-5 INJECTION, SOLUTION INTRAVENOUS; SUBCUTANEOUS
Status: DISCONTINUED | OUTPATIENT
Start: 2024-10-16 | End: 2024-10-17 | Stop reason: HOSPADM

## 2024-10-16 RX ORDER — SUCCINYLCHOLINE CHLORIDE 20 MG/ML
INJECTION INTRAMUSCULAR; INTRAVENOUS
Status: DISCONTINUED | OUTPATIENT
Start: 2024-10-16 | End: 2024-10-16

## 2024-10-16 RX ORDER — PROPOFOL 10 MG/ML
VIAL (ML) INTRAVENOUS
Status: DISCONTINUED | OUTPATIENT
Start: 2024-10-16 | End: 2024-10-16

## 2024-10-16 RX ORDER — CARVEDILOL 6.25 MG/1
6.25 TABLET ORAL 2 TIMES DAILY WITH MEALS
Status: DISCONTINUED | OUTPATIENT
Start: 2024-10-16 | End: 2024-10-17 | Stop reason: HOSPADM

## 2024-10-16 RX ORDER — SUCRALFATE 1 G/1
1 TABLET ORAL 4 TIMES DAILY
Qty: 120 TABLET | Refills: 0 | Status: SHIPPED | OUTPATIENT
Start: 2024-10-16 | End: 2024-11-16

## 2024-10-16 RX ORDER — LIDOCAINE HYDROCHLORIDE 20 MG/ML
INJECTION INTRAVENOUS
Status: DISCONTINUED | OUTPATIENT
Start: 2024-10-16 | End: 2024-10-16

## 2024-10-16 RX ORDER — HEPARIN SODIUM 1000 [USP'U]/ML
INJECTION, SOLUTION INTRAVENOUS; SUBCUTANEOUS
Status: DISCONTINUED | OUTPATIENT
Start: 2024-10-16 | End: 2024-10-16

## 2024-10-16 RX ORDER — FENTANYL CITRATE 50 UG/ML
INJECTION, SOLUTION INTRAMUSCULAR; INTRAVENOUS
Status: DISCONTINUED | OUTPATIENT
Start: 2024-10-16 | End: 2024-10-16

## 2024-10-16 RX ORDER — EPHEDRINE SULFATE 50 MG/ML
INJECTION, SOLUTION INTRAVENOUS
Status: DISCONTINUED | OUTPATIENT
Start: 2024-10-16 | End: 2024-10-16

## 2024-10-16 RX ORDER — LOSARTAN POTASSIUM 50 MG/1
50 TABLET ORAL NIGHTLY
Status: DISCONTINUED | OUTPATIENT
Start: 2024-10-16 | End: 2024-10-17 | Stop reason: HOSPADM

## 2024-10-16 RX ADMIN — PHENYLEPHRINE HYDROCHLORIDE 0.8 MCG/KG/MIN: 10 INJECTION INTRAVENOUS at 11:10

## 2024-10-16 RX ADMIN — EPHEDRINE SULFATE 5 MG: 50 INJECTION INTRAVENOUS at 12:10

## 2024-10-16 RX ADMIN — PROPOFOL 50 MG: 10 INJECTION, EMULSION INTRAVENOUS at 12:10

## 2024-10-16 RX ADMIN — EPHEDRINE SULFATE 10 MG: 50 INJECTION INTRAVENOUS at 02:10

## 2024-10-16 RX ADMIN — PHENYLEPHRINE HYDROCHLORIDE 200 MCG: 10 INJECTION INTRAVENOUS at 11:10

## 2024-10-16 RX ADMIN — Medication 30 MG: at 02:10

## 2024-10-16 RX ADMIN — SUCCINYLCHOLINE CHLORIDE 200 MG: 20 INJECTION, SOLUTION INTRAMUSCULAR; INTRAVENOUS at 11:10

## 2024-10-16 RX ADMIN — HEPARIN SODIUM 3300 UNITS: 1000 INJECTION, SOLUTION INTRAVENOUS; SUBCUTANEOUS at 01:10

## 2024-10-16 RX ADMIN — HEPARIN SODIUM AND DEXTROSE 12 UNITS/KG/HR: 10000; 5 INJECTION INTRAVENOUS at 12:10

## 2024-10-16 RX ADMIN — PROPOFOL 200 MG: 10 INJECTION, EMULSION INTRAVENOUS at 11:10

## 2024-10-16 RX ADMIN — LIDOCAINE HYDROCHLORIDE 100 MG: 20 INJECTION INTRAVENOUS at 11:10

## 2024-10-16 RX ADMIN — PHENYLEPHRINE HYDROCHLORIDE 100 MCG: 10 INJECTION INTRAVENOUS at 11:10

## 2024-10-16 RX ADMIN — HEPARIN SODIUM 17000 UNITS: 1000 INJECTION, SOLUTION INTRAVENOUS; SUBCUTANEOUS at 12:10

## 2024-10-16 RX ADMIN — MIDAZOLAM HYDROCHLORIDE 2 MG: 2 INJECTION, SOLUTION INTRAMUSCULAR; INTRAVENOUS at 11:10

## 2024-10-16 RX ADMIN — LOSARTAN POTASSIUM 50 MG: 50 TABLET, FILM COATED ORAL at 08:10

## 2024-10-16 RX ADMIN — SUCRALFATE 1 G: 1 TABLET ORAL at 08:10

## 2024-10-16 RX ADMIN — PROPOFOL 30 MG: 10 INJECTION, EMULSION INTRAVENOUS at 02:10

## 2024-10-16 RX ADMIN — EPHEDRINE SULFATE 5 MG: 50 INJECTION INTRAVENOUS at 02:10

## 2024-10-16 RX ADMIN — LIDOCAINE HYDROCHLORIDE 100 MG: 20 INJECTION INTRAVENOUS at 12:10

## 2024-10-16 RX ADMIN — DABIGATRAN ETEXILATE MESYLATE 150 MG: 150 CAPSULE ORAL at 08:10

## 2024-10-16 RX ADMIN — CARVEDILOL 6.25 MG: 6.25 TABLET, FILM COATED ORAL at 08:10

## 2024-10-16 RX ADMIN — PROTAMINE SULFATE 90 MG: 10 INJECTION, SOLUTION INTRAVENOUS at 02:10

## 2024-10-16 RX ADMIN — PANTOPRAZOLE SODIUM 40 MG: 40 TABLET, DELAYED RELEASE ORAL at 08:10

## 2024-10-16 RX ADMIN — FENTANYL CITRATE 100 MCG: 50 INJECTION, SOLUTION INTRAMUSCULAR; INTRAVENOUS at 11:10

## 2024-10-16 NOTE — ASSESSMENT & PLAN NOTE
In summary, Mr. Norris is a pleasant 64 year-old man with hypertension, aortic atherosclerosis observed on prior CT scan, prior DVT treated with coumadin, microscopic hematuria, and IBS and newly recognized atrial fibrillation/systolic dysfunction here for RFA PVI.     I had a long discussion with the patient about the pathophysiology and risks of atrial fibrillation and its basic pathophysiology, including its health implications and treatment options. Discussed the goal to reduce symptomatic arrhythmic episodes by pharmacologic and/or procedural methods and utilizing a rhythm versus a rate control strategy. Strongly advised continued rhythm control. He had a reduction in EF while in AF that has now recovered since restoration of sinus rhythm.       I spent about a half hour discussing the nature of PVI including transseptal puncture. We discussed risks and benefits at length. Our discussion included, but was not limited to the risk of death, infection, bleeding, stroke, MI, cardiac perforation, embolism, cardiac tamponade, vascular injury, valvular injury, AE fistula, injury to phrenic nerve, pulmonary vein stenosis and other organic injury including the possibility for need for surgery or pacemaker implantation.

## 2024-10-16 NOTE — ANESTHESIA POSTPROCEDURE EVALUATION
Anesthesia Post Evaluation    Patient: Kevan Norris    Procedure(s) Performed: Procedure(s) (LRB):  Ablation atrial fibrillation (N/A)    Final Anesthesia Type: general      Patient location during evaluation: PACU  Patient participation: Yes- Able to Participate  Level of consciousness: awake and alert  Post-procedure vital signs: reviewed and stable  Pain management: adequate  Airway patency: patent    PONV status at discharge: No PONV  Anesthetic complications: no      Cardiovascular status: blood pressure returned to baseline  Respiratory status: unassisted  Hydration status: euvolemic  Follow-up not needed.              Vitals Value Taken Time   /77 10/16/24 1501   Temp 36.5 °C (97.7 °F) 10/16/24 1445   Pulse 77 10/16/24 1503   Resp 13 10/16/24 1503   SpO2 100 % 10/16/24 1503   Vitals shown include unfiled device data.      No case tracking events are documented in the log.      Pain/Laurent Score: Laurent Score: 8 (10/16/2024  3:00 PM)

## 2024-10-16 NOTE — SUBJECTIVE & OBJECTIVE
Past Medical History:   Diagnosis Date    Blood clot in vein     Right Leg, 2009    Diabetes mellitus     Diabetes mellitus, type 2     Gout flare     Hyperlipemia     Malignant melanoma of skin, unspecified     left arm    Prostate atrophy     Varicose veins        Past Surgical History:   Procedure Laterality Date    COLONOSCOPY N/A 10/13/2022    Procedure: COLONOSCOPY Golytely;  Surgeon: Sacha Mead MD;  Location: [facility] ENDO;  Service: Endoscopy;  Laterality: N/A;    ECHOCARDIOGRAM,TRANSESOPHAGEAL N/A 5/23/2024    Procedure: Transesophageal echo (NUVIA) intra-procedure log documentation;  Surgeon: Jas Castellano MD;  Location: [facility] EP LAB;  Service: Cardiology;  Laterality: N/A;  AF, NUVIA/DCCV, MAC, TN, 3prep    TREATMENT OF CARDIAC ARRHYTHMIA N/A 5/23/2024    Procedure: Cardioversion or Defibrillation;  Surgeon: Sanchez Boucher MD;  Location: [facility] EP LAB;  Service: Cardiology;  Laterality: N/A;  AF, NUVIA/DCCV, MAC, TN, 3prep    TREATMENT OF CARDIAC ARRHYTHMIA N/A 6/25/2024    Procedure: Cardioversion or Defibrillation;  Surgeon: Sanchez Boucher MD;  Location: [facility] EP LAB;  Service: Cardiology;  Laterality: N/A;  AF, DCCV only, MAC, TN, 3prep    VARICOSE VEIN SURGERY         Review of patient's allergies indicates:  No Known Allergies    No current facility-administered medications on file prior to encounter.     Current Outpatient Medications on File Prior to Encounter   Medication Sig    amiodarone (PACERONE) 200 MG Tab Take 400 mg (2 tabs) TWICE daily for 14 days and then decrease to 200 mg (1 tab) ONCE daily    carvediloL (COREG) 6.25 MG tablet Take 1 tablet (6.25 mg total) by mouth 2 (two) times daily with meals.    cyanocobalamin, vitamin B-12, 2,500 mcg Tab Take 5,000 mcg by mouth once daily.    ergocalciferol (ERGOCALCIFEROL) 50,000 unit Cap TAKE 1 CAPSULE BY MOUTH EVERY 7 DAYS. THEN START DAILY OTC REPLACEMENT AFTER THIS RX IS COMPLETE    fluorouraciL (EFUDEX) 5 % cream Apply thin film to left lower  forearm 2times per day for 3 weeks; d/c if area bleeding or ulcerated; avoid eyes or mouth    gentamicin (GARAMYCIN) 0.1 % ointment Apply topically.    ketoconazole (NIZORAL) 2 % cream AAA bid x 3 weeks    losartan (COZAAR) 50 MG tablet Take 1 tablet (50 mg total) by mouth every evening. For blood pressure.    rosuvastatin (CRESTOR) 5 MG tablet Take 1 tablet (5 mg total) by mouth once daily. For cholesterol     Family History       Problem Relation (Age of Onset)    Diabetes Father, Sister          Tobacco Use    Smoking status: Former     Current packs/day: 0.00     Types: Cigarettes     Start date:      Quit date:      Years since quittin.8    Smokeless tobacco: Never   Substance and Sexual Activity    Alcohol use: Yes     Alcohol/week: 4.0 standard drinks of alcohol     Types: 4 Cans of beer per week     Comment: drinks beer, 2 cans, about 2x/week    Drug use: No    Sexual activity: Not Currently     Review of Systems   All other systems reviewed and are negative.    Objective:     Vital Signs (Most Recent):    Vital Signs (24h Range):  BP: ()/()   Arterial Line BP: ()/()           There is no height or weight on file to calculate BMI.             Physical Exam  Vitals and nursing note reviewed.   Constitutional:       Appearance: Normal appearance.   Cardiovascular:      Rate and Rhythm: Normal rate and regular rhythm.      Pulses: Normal pulses.      Heart sounds: Normal heart sounds.   Pulmonary:      Effort: Pulmonary effort is normal.   Musculoskeletal:      Right lower leg: No edema.      Left lower leg: No edema.   Skin:     General: Skin is warm.   Neurological:      General: No focal deficit present.            Significant Labs: All pertinent lab results from the last 24 hours have been reviewed.

## 2024-10-16 NOTE — PLAN OF CARE
Received pt to floor from home.  AAO x 4. Denies pain or discomfort. Respirations even and unlabored. No distress noted. Pt stable.  Admit assessment complete. IV x 2 placed.  Pt oriented to room and call bell placed within reach.

## 2024-10-16 NOTE — Clinical Note
Left message for patient to return call and ask to speak with a nurse about their results. Route return call to RN que or if need be to the RN pool (pool# [id number]) for later follow up.     Orders pending, want to confirm if patient wants to get testing done at [facility] where previous mammogram/US's were done.    The transseptal needle was removed intact.

## 2024-10-16 NOTE — NURSING
Patient arrived via stretcher, bilateral groin sites with no hematoma/oozing, patient denies pain, instructed on keep both lower extremities straight. VSS.       17:30 Sutures/stop cock removed, no oozing from sites. Patient tolerated well.    17:35 Patient stating he had 3 bags of belongings he has when arriving for procedure. Patient did not have any belonging when arriving to unit. CSU RN contacted EP nurse in reference and was told she would send someone to check for them and send them to his room.    17:41 Patients belongings brought to room by EP RN

## 2024-10-16 NOTE — Clinical Note
The procedural consent was signed. A history and physical note was completed in the chart. DC instructions

## 2024-10-16 NOTE — H&P
[facility] - Short Stay Cardiac Unit  Cardiac Electrophysiology  History and Physical     Admission Date: 10/16/2024  Code Status: Prior   Attending Provider: Sanchez Boucher MD   Principal Problem:New onset atrial fibrillation    Subjective:     Chief Complaint:  AF     HPI:  Mr. Norris is a 64 year-old man with hypertension, aortic atherosclerosis observed on prior CT scan, prior DVT treated with coumadin, microscopic hematuria, and IBS. He presented recently to Dr. Ott for annual follow-up and was observed to be in atrial fibrillation. Eliquis was started. An ECHO 7/2024 shows EF 55-60% with moderately dilated LA. Recent TSH was normal. He has no obvious symptoms. His main issue currently is recurrent diverticulitis.     4//2024: ECHO noted LVEF 30-35%     5/2024: NUVIA/DCCV, had early recurrence and underwent repeat DCCV after amiodarone loading. Maintaining sinus rhythm on amiodarone. Feels much better in sinus rhythm.     ECG is sinus bradycardia with a rate of 57    Past Medical History:   Diagnosis Date    Blood clot in vein     Right Leg, 2009    Diabetes mellitus     Diabetes mellitus, type 2     Gout flare     Hyperlipemia     Malignant melanoma of skin, unspecified     left arm    Prostate atrophy     Varicose veins        Past Surgical History:   Procedure Laterality Date    COLONOSCOPY N/A 10/13/2022    Procedure: COLONOSCOPY Golytely;  Surgeon: Sacha Mead MD;  Location: [facility] ENDO;  Service: Endoscopy;  Laterality: N/A;    ECHOCARDIOGRAM,TRANSESOPHAGEAL N/A 5/23/2024    Procedure: Transesophageal echo (NUVIA) intra-procedure log documentation;  Surgeon: Jas Castellano MD;  Location: [facility] EP LAB;  Service: Cardiology;  Laterality: N/A;  AF, NUVIA/DCCV, MAC, ND, 3prep    TREATMENT OF CARDIAC ARRHYTHMIA N/A 5/23/2024    Procedure: Cardioversion or Defibrillation;  Surgeon: Sanchez Boucher MD;  Location: [facility] EP LAB;  Service: Cardiology;  Laterality: N/A;  AF, NUVIA/DCCV, MAC, ND, 3prep    TREATMENT  OF CARDIAC ARRHYTHMIA N/A 2024    Procedure: Cardioversion or Defibrillation;  Surgeon: Sanchez Boucher MD;  Location: [facility] EP LAB;  Service: Cardiology;  Laterality: N/A;  AF, DCCV only, MAC, MA, 3prep    VARICOSE VEIN SURGERY         Review of patient's allergies indicates:  No Known Allergies    No current facility-administered medications on file prior to encounter.     Current Outpatient Medications on File Prior to Encounter   Medication Sig    amiodarone (PACERONE) 200 MG Tab Take 400 mg (2 tabs) TWICE daily for 14 days and then decrease to 200 mg (1 tab) ONCE daily    carvediloL (COREG) 6.25 MG tablet Take 1 tablet (6.25 mg total) by mouth 2 (two) times daily with meals.    cyanocobalamin, vitamin B-12, 2,500 mcg Tab Take 5,000 mcg by mouth once daily.    ergocalciferol (ERGOCALCIFEROL) 50,000 unit Cap TAKE 1 CAPSULE BY MOUTH EVERY 7 DAYS. THEN START DAILY OTC REPLACEMENT AFTER THIS RX IS COMPLETE    fluorouraciL (EFUDEX) 5 % cream Apply thin film to left lower forearm 2times per day for 3 weeks; d/c if area bleeding or ulcerated; avoid eyes or mouth    gentamicin (GARAMYCIN) 0.1 % ointment Apply topically.    ketoconazole (NIZORAL) 2 % cream AAA bid x 3 weeks    losartan (COZAAR) 50 MG tablet Take 1 tablet (50 mg total) by mouth every evening. For blood pressure.    rosuvastatin (CRESTOR) 5 MG tablet Take 1 tablet (5 mg total) by mouth once daily. For cholesterol     Family History       Problem Relation (Age of Onset)    Diabetes Father, Sister          Tobacco Use    Smoking status: Former     Current packs/day: 0.00     Types: Cigarettes     Start date:      Quit date:      Years since quittin.8    Smokeless tobacco: Never   Substance and Sexual Activity    Alcohol use: Yes     Alcohol/week: 4.0 standard drinks of alcohol     Types: 4 Cans of beer per week     Comment: drinks beer, 2 cans, about 2x/week    Drug use: No    Sexual activity: Not Currently     Review of Systems   All other  systems reviewed and are negative.    Objective:     Vital Signs (Most Recent):    Vital Signs (24h Range):  BP: ()/()   Arterial Line BP: ()/()           There is no height or weight on file to calculate BMI.             Physical Exam  Vitals and nursing note reviewed.   Constitutional:       Appearance: Normal appearance.   Cardiovascular:      Rate and Rhythm: Normal rate and regular rhythm.      Pulses: Normal pulses.      Heart sounds: Normal heart sounds.   Pulmonary:      Effort: Pulmonary effort is normal.   Musculoskeletal:      Right lower leg: No edema.      Left lower leg: No edema.   Skin:     General: Skin is warm.   Neurological:      General: No focal deficit present.            Significant Labs: All pertinent lab results from the last 24 hours have been reviewed.    Assessment and Plan:     * New onset atrial fibrillation  In summary, Mr. Norris is a pleasant 64 year-old man with hypertension, aortic atherosclerosis observed on prior CT scan, prior DVT treated with coumadin, microscopic hematuria, and IBS and newly recognized atrial fibrillation/systolic dysfunction here for RFA PVI.     I had a long discussion with the patient about the pathophysiology and risks of atrial fibrillation and its basic pathophysiology, including its health implications and treatment options. Discussed the goal to reduce symptomatic arrhythmic episodes by pharmacologic and/or procedural methods and utilizing a rhythm versus a rate control strategy. Strongly advised continued rhythm control. He had a reduction in EF while in AF that has now recovered since restoration of sinus rhythm.       I spent about a half hour discussing the nature of PVI including transseptal puncture. We discussed risks and benefits at length. Our discussion included, but was not limited to the risk of death, infection, bleeding, stroke, MI, cardiac perforation, embolism, cardiac tamponade, vascular injury, valvular injury, AE fistula, injury to  phrenic nerve, pulmonary vein stenosis and other organic injury including the possibility for need for surgery or pacemaker implantation.           Melva Flaherty MD  Cardiac Electrophysiology  [facility] - Short Stay Cardiac Unit

## 2024-10-16 NOTE — TRANSFER OF CARE
"Anesthesia Transfer of Care Note    Patient: Kevan Norris    Procedure(s) Performed: Procedure(s) (LRB):  Ablation atrial fibrillation (N/A)    Patient location: Cath Lab    Anesthesia Type: general    Transport from OR: Transported from OR on 6-10 L/min O2 by face mask with adequate spontaneous ventilation    Post pain: adequate analgesia    Post assessment: no apparent anesthetic complications and tolerated procedure well    Post vital signs: stable    Level of consciousness: awake, alert and oriented    Nausea/Vomiting: no nausea/vomiting    Complications: none    Transfer of care protocol was followed    Last vitals: Visit Vitals  BP (!) 149/68 (BP Location: Left arm, Patient Position: Lying)   Pulse 81   Temp 36.5 °C (97.7 °F) (Temporal)   Resp 12   Ht 6' 7" (2.007 m)   Wt 111.1 kg (245 lb)   SpO2 100%   BMI 27.60 kg/m²     "

## 2024-10-16 NOTE — HPI
Mr. Norris is a 64 year-old man with hypertension, aortic atherosclerosis observed on prior CT scan, prior DVT treated with coumadin, microscopic hematuria, and IBS. He presented recently to Dr. Ott for annual follow-up and was observed to be in atrial fibrillation. Eliquis was started. An ECHO 7/2024 shows EF 55-60% with moderately dilated LA. Recent TSH was normal. He has no obvious symptoms. His main issue currently is recurrent diverticulitis.     4//2024: ECHO noted LVEF 30-35%     5/2024: NUVIA/DCCV, had early recurrence and underwent repeat DCCV after amiodarone loading. Maintaining sinus rhythm on amiodarone. Feels much better in sinus rhythm.     ECG is sinus bradycardia with a rate of 57

## 2024-10-16 NOTE — ANESTHESIA PROCEDURE NOTES
Intubation    Date/Time: 10/16/2024 11:35 AM    Performed by: Lombard, Jeffrey C., CRNA  Authorized by: Nikos Artis MD    Intubation:     Induction:  Intravenous    Intubated:  Postinduction    Mask Ventilation:  Moderately difficult with oral airway    Attempts:  1    Attempted By:  Student (ERI Calix)    Method of Intubation:  Video laryngoscopy    Blade:  Powell 4    Laryngeal View Grade: Grade I - full view of cords      Difficult Airway Encountered?: No      Complications:  None    Airway Device:  Oral endotracheal tube    Airway Device Size:  7.5    Style/Cuff Inflation:  Cuffed (inflated to minimal occlusive pressure)    Inflation Amount (mL):  8    Tube secured:  22    Secured at:  The teeth    Placement Verified By:  Capnometry    Complicating Factors:  None    Findings Post-Intubation:  BS equal bilateral and atraumatic/condition of teeth unchanged

## 2024-10-16 NOTE — NURSING TRANSFER
Nursing Transfer Note      10/16/2024   4:13 PM    Transfer To: 300    Transfer via stretcher    Transfer with cardiac monitoring    Transported by hospital transport     Transfer Vital Signs: see flowsheet     Telemetry: Box Number [id number], Rate 73, and Rhythm NSR  Order for Tele Monitor? Yes    Additional Lines: Rocha Catheter    Chart send with patient: Yes    Notified: Sister    Patient reassessed at: 1600    Personal belongings transferred to room with patient.

## 2024-10-16 NOTE — ANESTHESIA PREPROCEDURE EVALUATION
"                                                                                                             10/16/2024  Kevan Norris is a 64 y.o., male.      Pre-op Assessment    I have reviewed the Patient Summary Reports.       I have reviewed the Medications.     Review of Systems  Anesthesia Hx:   History of prior surgery of interest to airway management or planning:            Denies Personal Hx of Anesthesia complications.                    Cardiovascular:     Hypertension       CHF        Normal BiV Fxn                           Hepatic/GI:     GERD                Endocrine:  Diabetes               Physical Exam  General: Well nourished    Airway:  Mallampati: III / II  Mouth Opening: Normal  TM Distance: Normal  Tongue: Normal    Dental:  Periodontal disease    Heart:  Rate: Normal        Anesthesia Plan  Type of Anesthesia, risks & benefits discussed:    Anesthesia Type: Gen ETT  Intra-op Monitoring Plan: Standard ASA Monitors and Art Line  Post Op Pain Control Plan: multimodal analgesia  Induction:  IV  Airway Plan: Video  Informed Consent: Informed consent signed with the Patient and all parties understand the risks and agree with anesthesia plan.  All questions answered.   ASA Score: 3  Day of Surgery Review of History & Physical: H&P Update referred to the surgeon/provider.  Anesthesia Plan Notes: Patient noted to be wheezing (mild). Reports catching a "cold" at work 3 weeks ago. No fevers or mucous.  Discussed in detail with patient and Dr. Boucher including increased risk for bronchospasm and mucous plug.  They understand and wish to proceed.  Very poor dentition with multiple chipped/loose teeth noted preop.    Ready For Surgery From Anesthesia Perspective.     .      "

## 2024-10-17 VITALS
DIASTOLIC BLOOD PRESSURE: 88 MMHG | HEART RATE: 58 BPM | OXYGEN SATURATION: 96 % | SYSTOLIC BLOOD PRESSURE: 143 MMHG | WEIGHT: 240 LBS | TEMPERATURE: 98 F | RESPIRATION RATE: 18 BRPM | BODY MASS INDEX: 27.77 KG/M2 | HEIGHT: 78 IN

## 2024-10-17 LAB
POCT GLUCOSE: 104 MG/DL (ref 70–110)
POCT GLUCOSE: 126 MG/DL (ref 70–110)

## 2024-10-17 PROCEDURE — 25000003 PHARM REV CODE 250: Performed by: STUDENT IN AN ORGANIZED HEALTH CARE EDUCATION/TRAINING PROGRAM

## 2024-10-17 PROCEDURE — 27201037 HC PRESSURE MONITORING SET UP

## 2024-10-17 RX ORDER — DABIGATRAN ETEXILATE 150 MG/1
150 CAPSULE ORAL 2 TIMES DAILY
Qty: 60 CAPSULE | Refills: 2 | Status: SHIPPED | OUTPATIENT
Start: 2024-10-17 | End: 2024-10-17 | Stop reason: SDUPTHER

## 2024-10-17 RX ORDER — SUCRALFATE 1 G/1
1 TABLET ORAL 4 TIMES DAILY
Qty: 120 TABLET | Refills: 0 | Status: SHIPPED | OUTPATIENT
Start: 2024-10-17 | End: 2024-10-17 | Stop reason: SDUPTHER

## 2024-10-17 RX ORDER — PANTOPRAZOLE SODIUM 40 MG/1
40 TABLET, DELAYED RELEASE ORAL DAILY
Qty: 30 TABLET | Refills: 0 | Status: SHIPPED | OUTPATIENT
Start: 2024-10-17 | End: 2024-10-17 | Stop reason: SDUPTHER

## 2024-10-17 RX ADMIN — PANTOPRAZOLE SODIUM 40 MG: 40 TABLET, DELAYED RELEASE ORAL at 08:10

## 2024-10-17 RX ADMIN — SUCRALFATE 1 G: 1 TABLET ORAL at 11:10

## 2024-10-17 RX ADMIN — DABIGATRAN ETEXILATE MESYLATE 150 MG: 150 CAPSULE ORAL at 08:10

## 2024-10-17 RX ADMIN — CARVEDILOL 6.25 MG: 6.25 TABLET, FILM COATED ORAL at 08:10

## 2024-10-17 RX ADMIN — SUCRALFATE 1 G: 1 TABLET ORAL at 06:10

## 2024-10-17 NOTE — PLAN OF CARE
Patient is ready for discharge. Patient stable alert and oriented. IVs removed. Tele removed and returned. No complaints of pain. Discussed discharge plan. Reviewed medications and side effects, appointments, and answered questions with patient and family. Patient picking up RX at the [facility]. AVS reviewed and given to patient Per MD Earl james for patient to drive himself home.

## 2024-10-17 NOTE — PLAN OF CARE
[facility] - Cardiology Stepdown  Discharge Final Note    Primary Care Provider: Jose A Ott DO    Expected Discharge Date: 10/17/2024    Final Discharge Note (most recent)       Final Note - 10/17/24 1450          Final Note    Assessment Type Final Discharge Note     Anticipated Discharge Disposition Home or Self Care     What phone number can be called within the next 1-3 days to see how you are doing after discharge? [phone]     Hospital Resources/Appts/Education Provided Provided patient/caregiver with written discharge plan information;Appointments scheduled and added to AVS        Post-Acute Status    Patient choice form signed by patient/caregiver List with quality metrics by geographic area provided     Discharge Delays None known at this time                   Patient to discharge to home today. Patient states he has a ride home. No case management needs at this time.     Contact Info       Sanchez Boucher MD   Specialty: Electrophysiology, Cardiology    [address]  [facility] [address]   Phone: [phone]       Next Steps: Follow up in 3 month(s)          Future Appointments   Date Time Provider Department Center   10/21/2024 10:00 AM APPOINTMENT [facility] LAB [facility]   11/4/2024  2:40 PM Jose A Ott DO [facility] FAM MED [place]   11/19/2024  1:00 PM EKG, APPT [facility] EKG [facility]   11/19/2024  1:20 PM Sanchez Boucher MD [facility] ARRHYTH [facility]   2/14/2025  2:00 PM Dima Jaimes MD [facility] CARDIO [facility]       JUDI Salgado  [facility]   Ext: [phone]

## 2024-10-17 NOTE — PLAN OF CARE
Problem: Wound  Goal: Absence of Infection Signs and Symptoms  Outcome: Progressing  Intervention: Prevent or Manage Infection  Flowsheets (Taken 10/17/2024 0000)  Fever Reduction/Comfort Measures: lightweight clothing  Isolation Precautions: precautions maintained  Goal: Improved Oral Intake  Outcome: Progressing  Intervention: Promote and Optimize Oral Intake  Flowsheets (Taken 10/17/2024 0000)  Oral Nutrition Promotion: rest periods promoted     Problem: Fall Injury Risk  Goal: Absence of Fall and Fall-Related Injury  Outcome: Progressing  Intervention: Identify and Manage Contributors  Flowsheets (Taken 10/17/2024 0000)  Self-Care Promotion:   independence encouraged   BADL personal objects within reach  Medication Review/Management: medications reviewed  Intervention: Promote Injury-Free Environment  Flowsheets (Taken 10/17/2024 0000)  Safety Promotion/Fall Prevention:   assistive device/personal item within reach   commode/urinal/bedpan at bedside   room near unit station   side rails raised x 2   lighting adjusted   nonskid shoes/socks when out of bed

## 2024-10-17 NOTE — PLAN OF CARE
[facility] - Cardiology Stepdown  Initial Discharge Assessment       Primary Care Provider: Jose A Ott DO    Admission Diagnosis: New onset atrial fibrillation [I48.91]  New onset of congestive heart failure [I50.9]  History of DVT (deep vein thrombosis) [Z86.718]  Essential hypertension [I10]    Admission Date: 10/16/2024  Expected Discharge Date:     Transition of Care Barriers: None    Payor: MEDICARE / Plan: MEDICARE PART A & B / Product Type: Government /     Extended Emergency Contact Information  Primary Emergency Contact: Radha,July  Mobile Phone: [phone]  Relation: Sister  Secondary Emergency Contact: NathanaelcrReza wong   [facility]  Home Phone: [phone]  Relation: Friend    Discharge Plan A: Home  Discharge Plan B: Home      [id number] [place] - [address]  [address] [address]  Phone: [phone] Fax: [phone]      Initial Assessment (most recent)       Adult Discharge Assessment - 10/17/24 0939          Discharge Assessment    Assessment Type Discharge Planning Assessment     Confirmed/corrected address, phone number and insurance Yes     Confirmed Demographics Correct on Facesheet     Source of Information patient     Does patient/caregiver understand observation status Yes     Communicated BARAK with patient/caregiver Yes;Date not available/Unable to determine     Reason For Admission New onset atrial fibrillation     People in Home alone     Do you expect to return to your current living situation? Yes     Do you have help at home or someone to help you manage your care at home? No     Prior to hospitilization cognitive status: Alert/Oriented     Current cognitive status: Alert/Oriented     Walking or Climbing Stairs Difficulty no     Dressing/Bathing Difficulty no     Equipment Currently Used at Home none     Readmission within 30 days? No     Patient currently being followed by outpatient case management? No     Do you  currently have service(s) that help you manage your care at home? No     Do you take prescription medications? Yes     Do you have prescription coverage? Yes     Coverage Payor: MEDICARE - MEDICARE PART A & B -     Do you have any problems affording any of your prescribed medications? No     Is the patient taking medications as prescribed? yes     How do you get to doctors appointments? car, drives self;family or friend will provide     Are you on dialysis? No     Do you take coumadin? No     Discharge Plan A Home     Discharge Plan B Home     DME Needed Upon Discharge  none     Discharge Plan discussed with: Patient     Transition of Care Barriers None        Physical Activity    On average, how many days per week do you engage in moderate to strenuous exercise (like a brisk walk)? 0 days     On average, how many minutes do you engage in exercise at this level? 0 min        Financial Resource Strain    How hard is it for you to pay for the very basics like food, housing, medical care, and heating? Not hard at all        Housing Stability    In the last 12 months, was there a time when you were not able to pay the mortgage or rent on time? No     At any time in the past 12 months, were you homeless or living in a shelter (including now)? No        Transportation Needs    Has the lack of transportation kept you from medical appointments, meetings, work or from getting things needed for daily living? No        Food Insecurity    Within the past 12 months, you worried that your food would run out before you got the money to buy more. Never true     Within the past 12 months, the food you bought just didn't last and you didn't have money to get more. Never true        Stress    Do you feel stress - tense, restless, nervous, or anxious, or unable to sleep at night because your mind is troubled all the time - these days? Not at all        Social Isolation    How often do you feel lonely or isolated from those around you?   Never        Alcohol Use    Q1: How often do you have a drink containing alcohol? Never     Q2: How many drinks containing alcohol do you have on a typical day when you are drinking? Patient does not drink     Q3: How often do you have six or more drinks on one occasion? Never        Utilities    In the past 12 months has the electric, gas, oil, or water company threatened to shut off services in your home? No        Health Literacy    How often do you need to have someone help you when you read instructions, pamphlets, or other written material from your doctor or pharmacy? Never                   Spoke to pt. Pt lives at home alone. Post hospital  stay patients sister will be pt support person and pt. has transportation at d/c with his sister. There have been no hospitalizations within the last 30 days per pt. Verified pt PCP and preferred pharmacy. Pt stated not on Coumadin and is not receiving dialysis. All questions answered regarding case management/ discharge planning , pt verbalized understanding. Discharge booklet with SW contact information given to pt.     Discharge Plan A and Plan B have been determined by review of patient's clinical status, future medical and therapeutic needs, and coverage/benefits for post-acute care in coordination with multidisciplinary team members.      JUDI Salgado  [facility]   Ext: [phone]             [employer] Pregnancy And Lactation Text: This medication is Pregnancy Category B and is considered safe. There is some data to suggest avoiding during the third trimester. It is unknown if this medication is excreted in breast milk.

## 2024-10-17 NOTE — NURSING
Received report from RHIANNON Padilla. Patient alert and oriented. No  pain. No sign of distress. Alexander groin dressing checked - dry and intact ,no bleeding noted.  Ext Cath removed - walked to bathroom without problem. Urinated good amount. Iv line in place- saline locked. Tele monitor in place.On room air. Call bell given on his reach. Instructed to call for any concerns or assistance. Bed on lowest position. Non skid socks on. Plan of care discussed with patient, question answered.

## 2024-10-17 NOTE — DISCHARGE INSTRUCTIONS
"Medications:  Make sure to start taking your blood thinner dabigatran (trade name: Pradaxa) after your procedure as you would normally take  Take pantoprazole (trade name: Protonix) nightly + Carafate for at least 30 days after your procedure. This is to protect your esophagus during the post-operative period.  If given a prescription of furosemide (trade name: Lasix), which is a diuretic (fluid pill), you can take it daily or twice daily as needed for fluid retention or shortness of breath following your ablation  You may experience chest discomfort (also known as "pericarditis") with deep breathes, coughing, and/or laying down which is typically normal following your procedure. If this occurs, you can take ibuprofen (Motrin) 800 mg every 8 hours for 2-3 days. If the chest pain is persistent or severe please visit the nearest emergency department.    Groin site management, precautions, and restrictions:  Remove the bandages over your groin area the morning after your procedure. You can shower after you remove these bandages. Keep the groin sites clean and dry. You do not need to apply ointments or bandages to the area.   If oozing from groin site occurs, apply pressure without letting up for 15 minutes and lay flat for 1 hour. If bleeding has resolved, you can continue to monitor. If the bleeding continues or there is significant swelling or pain in the groin area, please visit the nearest ER for evaluation and treatment. DO NOT STOP TAKING YOUR BLOOD THINNER UNLESS INSTRUCTED BY A PHYSICIAN.   Do not take baths or submerge your groin area or at least 1 week or when the puncture sites in your groin have completely healed  Do not lift anything over 10 lbs for the first week after your procedure, and avoid strenuous activity during this time to allow for the groin sites to heal. After 1 week, there are no activity restrictions.  Please contact the electrophysiology clinic or go to the ER if you experience: severe " chest pain, shortness of breath, bleeding or swelling of the groin sites, or any other concerns.    Electrodesiccation And Curettage Text: The wound bed was treated with electrodesiccation and curettage after the biopsy was performed.

## 2024-10-18 LAB — POCT GLUCOSE: 158 MG/DL (ref 70–110)

## 2024-10-21 NOTE — DISCHARGE SUMMARY
[facility] - Cardiology Stepdown  Cardiac Electrophysiology  Discharge Summary      Patient Name: Kevan Norris  MRN: [id number]  Admission Date: 10/16/2024  Hospital Length of Stay: 0 days  Discharge Date and Time:  10/21/2024 5:07 PM  Attending Physician: No att. providers found    Discharging Provider: Melva Flaherty MD  Primary Care Physician: Jose A Ott DO    HPI:   Mr. Norris is a 64 year-old man with hypertension, aortic atherosclerosis observed on prior CT scan, prior DVT treated with coumadin, microscopic hematuria, and IBS. He presented recently to Dr. Ott for annual follow-up and was observed to be in atrial fibrillation. Eliquis was started. An ECHO 7/2024 shows EF 55-60% with moderately dilated LA. Recent TSH was normal. He has no obvious symptoms. His main issue currently is recurrent diverticulitis.     4//2024: ECHO noted LVEF 30-35%     5/2024: NUVIA/DCCV, had early recurrence and underwent repeat DCCV after amiodarone loading. Maintaining sinus rhythm on amiodarone. Feels much better in sinus rhythm.     ECG is sinus bradycardia with a rate of 57    Procedure(s) (LRB):  Ablation atrial fibrillation (N/A)     Indwelling Lines/Drains at time of discharge:  Lines/Drains/Airways       None                   Hospital Course:  Patient underwent successful RF-PVI with posterior wall isolation for treatment of atrial fibrillation. No evidence of intra- or post-procedure complications. Post-ablation ECG shows NSR, and no acute abnormalities.      EP medications at discharge:   Antiarrhythmics and/or AVN agents: unchanged.  Continue Amio.   Initiation of Protonix 40 mg daily x 30 days + Carafate .    Patient was instructed to continue their oral anticoagulation as previously prescribed. He was previously on Warfarin and consistently subtherapeutic. Switched to pradaxa prior to discharge.   Patient was instructed to take ibuprofen 800 mg TID x 3 days for pericarditis.      Groin access sites  "without hematoma or bleeding. Activity restrictions given to patient. Instructed to seek medical attention for shortness of breath, chest discomfort not alleviated with NSAIDs, bleeding/hematoma formation at the access sites, acute onset of neurologic symptoms, N/V, or hematemesis. At discharge the patient denied CP, SOB, access site bleeding/hematoma, or any other complaints or evidence of complications.         Goals of Care Treatment Preferences:  Code Status: Full Code      Consults:     Significant Diagnostic Studies: N/A    Pending Diagnostic Studies:       None            Final Active Diagnoses:    Diagnosis Date Noted POA    PRINCIPAL PROBLEM:  New onset atrial fibrillation [I48.91] 04/25/2024 Yes      Problems Resolved During this Admission:     No new Assessment & Plan notes have been filed under this hospital service since the last note was generated.  Service: Arrhythmia      Discharged Condition: stable    Disposition: Home or Self Care    Follow Up:   Follow-up Information       Sanchez Boucher MD Follow up in 3 month(s).    Specialties: Electrophysiology, Cardiology  Contact information:  [address] [place] [id number] [phone]                           Patient Instructions:   No discharge procedures on file.  Medications:  Reconciled Home Medications:      Medication List        START taking these medications      dabigatran etexilate 150 mg Cap  Commonly known as: PRADAXA  Take 1 capsule (150 mg total) by mouth 2 (two) times a day. "Do NOT break, chew, or open capsules."     pantoprazole 40 MG tablet  Commonly known as: PROTONIX  Take 1 tablet (40 mg total) by mouth once daily.     sucralfate 1 gram tablet  Commonly known as: CARAFATE  Take 1 tablet (1 g total) by mouth 4 (four) times daily.            CONTINUE taking these medications      amiodarone 200 MG Tab  Commonly known as: PACERONE  Take 400 mg (2 tabs) TWICE daily for 14 days and then decrease to 200 mg (1 tab) ONCE daily   "   carvediloL 6.25 MG tablet  Commonly known as: COREG  Take 1 tablet (6.25 mg total) by mouth 2 (two) times daily with meals.     cyanocobalamin (vitamin B-12) 2,500 mcg Tab  Take 5,000 mcg by mouth once daily.     ergocalciferol 50,000 unit Cap  Commonly known as: ERGOCALCIFEROL  TAKE 1 CAPSULE BY MOUTH EVERY 7 DAYS. THEN START DAILY OTC REPLACEMENT AFTER THIS RX IS COMPLETE     fluorouraciL 5 % cream  Commonly known as: EFUDEX  Apply thin film to left lower forearm 2times per day for 3 weeks; d/c if area bleeding or ulcerated; avoid eyes or mouth     gentamicin 0.1 % ointment  Commonly known as: GARAMYCIN  Apply topically.     ketoconazole 2 % cream  Commonly known as: NIZORAL  AAA bid x 3 weeks     losartan 50 MG tablet  Commonly known as: COZAAR  Take 1 tablet (50 mg total) by mouth every evening. For blood pressure.     rosuvastatin 5 MG tablet  Commonly known as: CRESTOR  Take 1 tablet (5 mg total) by mouth once daily. For cholesterol              Time spent on the discharge of patient: 45 minutes    Melva Flaherty MD  Cardiac Electrophysiology  [facility] - Cardiology Stepdown

## 2024-10-21 NOTE — HOSPITAL COURSE
Patient underwent successful RF-PVI with posterior wall isolation for treatment of atrial fibrillation. No evidence of intra- or post-procedure complications. Post-ablation ECG shows NSR, and no acute abnormalities.      EP medications at discharge:   Antiarrhythmics and/or AVN agents: unchanged.  Continue Amio.   Initiation of Protonix 40 mg daily x 30 days + Carafate .    Patient was instructed to continue their oral anticoagulation as previously prescribed. He was previously on Warfarin and consistently subtherapeutic. Switched to pradaxa prior to discharge.   Patient was instructed to take ibuprofen 800 mg TID x 3 days for pericarditis.      Groin access sites without hematoma or bleeding. Activity restrictions given to patient. Instructed to seek medical attention for shortness of breath, chest discomfort not alleviated with NSAIDs, bleeding/hematoma formation at the access sites, acute onset of neurologic symptoms, N/V, or hematemesis. At discharge the patient denied CP, SOB, access site bleeding/hematoma, or any other complaints or evidence of complications.

## 2024-10-25 RX ORDER — AMIODARONE HYDROCHLORIDE 200 MG/1
200 TABLET ORAL DAILY
Qty: 90 TABLET | Refills: 2 | Status: SHIPPED | OUTPATIENT
Start: 2024-10-25

## 2024-10-25 NOTE — TELEPHONE ENCOUNTER
----- Message from Jose sent at 10/25/2024  2:41 PM CDT -----  Regarding refills ergocalciferol (ERGOCALCIFEROL) 50,000 unit Cap amiodarone (PACERONE) 200 MG Tab. He can be reached at [phone].      [facility]/pharmacy #[id number] - [place] - [address]  Phone: [phone]  Fax: [phone]    Thank you

## 2024-11-01 ENCOUNTER — TELEPHONE (OUTPATIENT)
Dept: FAMILY MEDICINE | Facility: CLINIC | Age: 64
End: 2024-11-01
Payer: MEDICARE

## 2024-11-04 ENCOUNTER — OFFICE VISIT (OUTPATIENT)
Dept: FAMILY MEDICINE | Facility: CLINIC | Age: 64
End: 2024-11-04
Payer: MEDICARE

## 2024-11-04 VITALS
DIASTOLIC BLOOD PRESSURE: 84 MMHG | OXYGEN SATURATION: 95 % | HEART RATE: 62 BPM | BODY MASS INDEX: 29.13 KG/M2 | SYSTOLIC BLOOD PRESSURE: 126 MMHG | WEIGHT: 251.75 LBS | HEIGHT: 78 IN

## 2024-11-04 DIAGNOSIS — I10 ESSENTIAL HYPERTENSION: Primary | ICD-10-CM

## 2024-11-04 DIAGNOSIS — R79.89 ABNORMAL CBC: ICD-10-CM

## 2024-11-04 DIAGNOSIS — E55.9 VITAMIN D DEFICIENCY: ICD-10-CM

## 2024-11-04 DIAGNOSIS — Z23 NEED FOR VACCINATION AGAINST STREPTOCOCCUS PNEUMONIAE: ICD-10-CM

## 2024-11-04 DIAGNOSIS — Z23 NEED FOR VACCINATION: ICD-10-CM

## 2024-11-04 DIAGNOSIS — Z12.5 SCREENING PSA (PROSTATE SPECIFIC ANTIGEN): ICD-10-CM

## 2024-11-04 DIAGNOSIS — D51.8 OTHER VITAMIN B12 DEFICIENCY ANEMIA: ICD-10-CM

## 2024-11-04 DIAGNOSIS — Z13.6 ENCOUNTER FOR ABDOMINAL AORTIC ANEURYSM (AAA) SCREENING: ICD-10-CM

## 2024-11-04 DIAGNOSIS — E78.49 OTHER HYPERLIPIDEMIA: ICD-10-CM

## 2024-11-04 DIAGNOSIS — L98.9 SKIN LESION: ICD-10-CM

## 2024-11-04 PROCEDURE — 90653 IIV ADJUVANT VACCINE IM: CPT | Mod: PBBFAC,PO

## 2024-11-04 PROCEDURE — G0008 ADMIN INFLUENZA VIRUS VAC: HCPCS | Mod: PBBFAC,PO

## 2024-11-04 PROCEDURE — G0009 ADMIN PNEUMOCOCCAL VACCINE: HCPCS | Mod: PBBFAC,PO

## 2024-11-04 PROCEDURE — 99214 OFFICE O/P EST MOD 30 MIN: CPT | Mod: S$PBB,,, | Performed by: FAMILY MEDICINE

## 2024-11-04 PROCEDURE — 99999PBSHW PR PBB SHADOW TECHNICAL ONLY FILED TO HB: Mod: PBBFAC,,,

## 2024-11-04 PROCEDURE — 99999 PR PBB SHADOW E&M-EST. PATIENT-LVL IV: CPT | Mod: PBBFAC,,, | Performed by: FAMILY MEDICINE

## 2024-11-04 PROCEDURE — 99214 OFFICE O/P EST MOD 30 MIN: CPT | Mod: PBBFAC,PO | Performed by: FAMILY MEDICINE

## 2024-11-04 PROCEDURE — 90677 PCV20 VACCINE IM: CPT | Mod: PBBFAC,PO

## 2024-11-04 RX ORDER — LOSARTAN POTASSIUM 50 MG/1
50 TABLET ORAL NIGHTLY
Qty: 90 TABLET | Refills: 1 | Status: SHIPPED | OUTPATIENT
Start: 2024-11-04 | End: 2025-11-04

## 2024-11-04 RX ORDER — PANTOPRAZOLE SODIUM 40 MG/1
40 TABLET, DELAYED RELEASE ORAL DAILY
Qty: 90 TABLET | Refills: 1 | Status: SHIPPED | OUTPATIENT
Start: 2024-11-04 | End: 2025-11-04

## 2024-11-04 RX ORDER — ROSUVASTATIN CALCIUM 5 MG/1
5 TABLET, COATED ORAL DAILY
Qty: 90 TABLET | Refills: 3 | Status: SHIPPED | OUTPATIENT
Start: 2024-11-04

## 2024-11-04 RX ORDER — ERGOCALCIFEROL 1.25 MG/1
CAPSULE ORAL
Qty: 12 CAPSULE | Refills: 3 | Status: SHIPPED | OUTPATIENT
Start: 2024-11-04

## 2024-11-04 RX ORDER — CHOLECALCIFEROL (VITAMIN D3) 25 MCG
5000 TABLET,CHEWABLE ORAL DAILY
Start: 2024-11-04

## 2024-11-04 RX ADMIN — PNEUMOCOCCAL 20-VALENT CONJUGATE VACCINE 0.5 ML
2.2; 2.2; 2.2; 2.2; 2.2; 2.2; 2.2; 2.2; 2.2; 2.2; 2.2; 2.2; 2.2; 2.2; 2.2; 2.2; 4.4; 2.2; 2.2; 2.2 INJECTION, SUSPENSION INTRAMUSCULAR at 03:11

## 2024-11-04 RX ADMIN — INFLUENZA A VIRUS A/VICTORIA/4897/2022 IVR-238 (H1N1) ANTIGEN (FORMALDEHYDE INACTIVATED), INFLUENZA A VIRUS A/THAILAND/8/2022 IVR-237 (H3N2) ANTIGEN (FORMALDEHYDE INACTIVATED), INFLUENZA B VIRUS B/AUSTRIA/1359417/2021 BVR-26 ANTIGEN (FORMALDEHYDE INACTIVATED) 0.5 ML: 15; 15; 15 INJECTION, SUSPENSION INTRAMUSCULAR at 02:11

## 2024-11-04 NOTE — PROGRESS NOTES
"Subjective:       Patient ID: Kevan Norris is a 64 y.o. male.    Chief Complaint: Follow-up      64 year old male presents today for f/u    A fib: seeing EP. Had ablation. Has f/u with EP. Also on amiodarone for 3 months. EP also started patient on coreg. On pradexa as well. More energetic after ablation. No palpitations.   HTN: on losartan and coreg. BP well controlled today.   GERD: on protonix. No symptoms.   DLD: on crestor 5 mg.   Low vitamin D: on vitamin D weekly.   b12 def: repeat 4/2025 Normal ESR/CRP.  Venous stasis ulcer: was seeing wound care. Has been d/c. No ulcer currently. Still has some VI. Seeing cardiology.     Hematuria: saw urology, had repeat cystoscopy 1/18/2022. Per that note, "If still microscopic hematuria present in 5 years can consider repeat workup (1/2027)"    IBS-C: stomach is better  Diverticulitis is resolved            Review of Systems   Constitutional:  Negative for chills and fever.   Respiratory:  Negative for chest tightness and shortness of breath.    Cardiovascular:  Negative for chest pain, palpitations and leg swelling.   Gastrointestinal:  Negative for nausea and vomiting.   Genitourinary:  Negative for difficulty urinating and dysuria.   Neurological:  Negative for dizziness, syncope, light-headedness and headaches.         Health Maintenance Due   Topic Date Due    HIV Screening  Never done    Shingles Vaccine (1 of 2) Never done    RSV Vaccine (Age 60+ and Pregnant patients) (1 - Risk 60-74 years 1-dose series) Never done    COVID-19 Vaccine (3 - 2024-25 season) 09/01/2024     Immunization History   Administered Date(s) Administered    COVID-19, MRNA, LN-S, PF (MODERNA FULL 0.5 ML DOSE) 09/23/2021, 10/21/2021    Influenza - Quadrivalent - PF *Preferred* (6 months and older) 09/07/2017, 10/21/2021    Influenza - Trivalent - Afluria, Fluzone MDV 10/25/2010, 09/20/2011    Influenza - Trivalent - Fluad - Adjuvanted - PF (65 years and older 11/04/2024    Influenza - " "Trivalent - Fluarix, Flulaval, Fluzone, Afluria - PF 09/08/2012, 09/30/2015    Pneumococcal Conjugate - 20 Valent 11/04/2024    Pneumococcal Polysaccharide - 23 Valent 10/25/2010, 11/13/2021    Tdap 04/25/2024       Objective:     Vitals:    11/04/24 1436   BP: 126/84   BP Location: Right arm   Patient Position: Sitting   Pulse: 62   SpO2: 95%   Weight: 114.2 kg (251 lb 12.3 oz)   Height: 6' 7" (2.007 m)        Physical Exam  Constitutional:       General: He is not in acute distress.     Appearance: He is not ill-appearing, toxic-appearing or diaphoretic.   Cardiovascular:      Rate and Rhythm: Normal rate and regular rhythm.      Comments: Appears to be RRR  Pulmonary:      Effort: Pulmonary effort is normal.      Breath sounds: Normal breath sounds.   Abdominal:      Palpations: Abdomen is soft.      Tenderness: There is no guarding.   Musculoskeletal:         General: No swelling.   Skin:     Comments: Multiple Sks noted  Area of rough skin noted, actinic keratosis?   Neurological:      General: No focal deficit present.      Mental Status: He is alert.   Psychiatric:         Mood and Affect: Mood normal.         Behavior: Behavior normal.         Thought Content: Thought content normal.         Judgment: Judgment normal.         Assessment:       1. Essential hypertension    2. Need for vaccination    3. Other hyperlipidemia    4. Vitamin D deficiency    5. Other vitamin B12 deficiency anemia    6. Need for vaccination against Streptococcus pneumoniae    7. Screening PSA (prostate specific antigen)    8. Abnormal CBC    9. Skin lesion    10. Encounter for abdominal aortic aneurysm (AAA) screening        Plan:         HTN: continue coreg and losartan  EP: continue pradaxa or other anticoagulation per EP. Continue amiodarone for 90 days. Eliquis is cheaper with new medicare insurance?  DLD: continue statin    Vitamin D weekly  B12 daily    Keep f/u with EP    Refer back to dermatology. Has been >1 year.     F/u 6 " months, labs prior.       Essential hypertension  -     losartan (COZAAR) 50 MG tablet; Take 1 tablet (50 mg total) by mouth every evening. For blood pressure.  Dispense: 90 tablet; Refill: 1  -     CBC Auto Differential; Future; Expected date: 11/04/2024  -     Comprehensive Metabolic Panel; Future; Expected date: 11/04/2024  -     TSH; Future; Expected date: 11/04/2024    Need for vaccination  -     influenza (adjuvanted) (Fluad) 45 mcg/0.5 mL IM vaccine (> or = 64 yo) 0.5 mL    Other hyperlipidemia  -     rosuvastatin (CRESTOR) 5 MG tablet; Take 1 tablet (5 mg total) by mouth once daily. For cholesterol  Dispense: 90 tablet; Refill: 3  -     Lipid Panel; Future; Expected date: 11/04/2024    Vitamin D deficiency  -     ergocalciferol (ERGOCALCIFEROL) 50,000 unit Cap; TAKE 1 CAPSULE BY MOUTH EVERY 7 DAYS.  Dispense: 12 capsule; Refill: 3  -     Vitamin D; Future; Expected date: 11/04/2024    Other vitamin B12 deficiency anemia  -     cyanocobalamin, vitamin B-12, 2,500 mcg Tab; Take 5,000 mcg by mouth once daily.  -     Vitamin B12; Future; Expected date: 11/04/2024  -     FOLATE; Future; Expected date: 11/04/2024    Need for vaccination against Streptococcus pneumoniae  -     (VFC) PCV20 (Prevnar 20) IM vaccine (>/= 6 wks)    Screening PSA (prostate specific antigen)  -     PSA, Screening; Future; Expected date: 11/04/2024    Abnormal CBC  -     Hemoglobin A1C; Future; Expected date: 11/04/2024    Skin lesion  -     Ambulatory referral/consult to Dermatology; Future; Expected date: 11/11/2024    Encounter for abdominal aortic aneurysm (AAA) screening  -     US AAA Screening; Future; Expected date: 11/04/2024    Other orders  -     pantoprazole (PROTONIX) 40 MG tablet; Take 1 tablet (40 mg total) by mouth once daily.  Dispense: 90 tablet; Refill: 1

## 2024-11-11 ENCOUNTER — TELEPHONE (OUTPATIENT)
Dept: ELECTROPHYSIOLOGY | Facility: CLINIC | Age: 64
End: 2024-11-11
Payer: MEDICARE

## 2024-11-11 ENCOUNTER — HOSPITAL ENCOUNTER (OUTPATIENT)
Dept: RADIOLOGY | Facility: HOSPITAL | Age: 64
Discharge: HOME | End: 2024-11-11
Attending: FAMILY MEDICINE
Payer: MEDICARE

## 2024-11-11 DIAGNOSIS — Z13.6 ENCOUNTER FOR ABDOMINAL AORTIC ANEURYSM (AAA) SCREENING: ICD-10-CM

## 2024-11-11 PROCEDURE — 76706 US ABDL AORTA SCREEN AAA: CPT | Mod: 26,,, | Performed by: RADIOLOGY

## 2024-11-11 PROCEDURE — 76706 US ABDL AORTA SCREEN AAA: CPT | Mod: TC

## 2024-11-19 ENCOUNTER — OFFICE VISIT (OUTPATIENT)
Dept: ELECTROPHYSIOLOGY | Facility: CLINIC | Age: 64
End: 2024-11-19
Payer: MEDICARE

## 2024-11-19 ENCOUNTER — HOSPITAL ENCOUNTER (OUTPATIENT)
Dept: CARDIOLOGY | Facility: CLINIC | Age: 64
Discharge: HOME | End: 2024-11-19
Payer: MEDICARE

## 2024-11-19 VITALS
HEART RATE: 61 BPM | DIASTOLIC BLOOD PRESSURE: 78 MMHG | WEIGHT: 112 LBS | SYSTOLIC BLOOD PRESSURE: 120 MMHG | BODY MASS INDEX: 12.96 KG/M2 | HEIGHT: 78 IN

## 2024-11-19 DIAGNOSIS — I10 ESSENTIAL HYPERTENSION: ICD-10-CM

## 2024-11-19 DIAGNOSIS — I48.91 NEW ONSET ATRIAL FIBRILLATION: ICD-10-CM

## 2024-11-19 DIAGNOSIS — I50.9 NEW ONSET OF CONGESTIVE HEART FAILURE: ICD-10-CM

## 2024-11-19 DIAGNOSIS — I48.19 PERSISTENT ATRIAL FIBRILLATION: Primary | ICD-10-CM

## 2024-11-19 LAB
OHS QRS DURATION: 112 MS
OHS QTC CALCULATION: 446 MS

## 2024-11-19 PROCEDURE — 99213 OFFICE O/P EST LOW 20 MIN: CPT | Mod: PBBFAC | Performed by: INTERNAL MEDICINE

## 2024-11-19 PROCEDURE — 93010 ELECTROCARDIOGRAM REPORT: CPT | Mod: S$PBB,,, | Performed by: INTERNAL MEDICINE

## 2024-11-19 PROCEDURE — 93005 ELECTROCARDIOGRAM TRACING: CPT | Mod: PBBFAC | Performed by: INTERNAL MEDICINE

## 2024-11-19 PROCEDURE — 99214 OFFICE O/P EST MOD 30 MIN: CPT | Mod: S$PBB,,, | Performed by: INTERNAL MEDICINE

## 2024-11-19 PROCEDURE — 99999 PR PBB SHADOW E&M-EST. PATIENT-LVL III: CPT | Mod: PBBFAC,,, | Performed by: INTERNAL MEDICINE

## 2024-11-19 RX ORDER — DABIGATRAN ETEXILATE 150 MG/1
150 CAPSULE ORAL 2 TIMES DAILY
Qty: 60 CAPSULE | Refills: 2 | Status: SHIPPED | OUTPATIENT
Start: 2024-11-19 | End: 2025-02-17

## 2024-11-19 NOTE — PROGRESS NOTES
Subjective   Patient ID:  Kevan Norris is a 64 y.o. male who presents for evaluation of Atrial Fibrillation    Referring Physician: Jose A Ott,     HPI  Prior Hx:  I had the pleasure of seeing Mr. Norris today in our electrophysiology clinic in consultation for his atrial fibrillation. As you are aware he is a pleasant 64 year-old man with hypertension, aortic atherosclerosis observed on prior CT scan, prior DVT treated with coumadin, microscopic hematuria, and IBS. He presented recently to Dr. Ott for annual follow-up. He was observed to be in atrial fibrillation. Eliquis was started. An ECHO is pending. Recent TSH was normal. He has no obvious symptoms. His main issue currently is recurrent diverticulitis.    4//2024: ECHO noted LVEF 30-35%    5/2024: NUVIA/DCCV, had early recurrence and underwent repeat DCCV after amiodarone loading.    7/2024: Mr. Norris returned for follow-up. Maintaining sinus rhythm on amiodarone. Feels much better in sinus rhythm. Repeat ECHO in sinus rhythm noted a normal LV function. He elected to proceed with ablation.    10/2024: PVI, LA posterior wall isolation. Changed from warfarin to pradaxa.    Interim Hx:  Mr. Norris returns for follow-up. No issues post-ablation.    My interpretation of today's in-clinic ECG is sinus rhythm with narrow QRS      Review of Systems   Constitutional: Negative for fever and malaise/fatigue.   HENT:  Negative for congestion and sore throat.    Eyes:  Negative for blurred vision and visual disturbance.   Cardiovascular:  Negative for dyspnea on exertion, irregular heartbeat and near-syncope.   Respiratory:  Negative for cough.    Hematologic/Lymphatic: Negative for bleeding problem. Does not bruise/bleed easily.   Skin: Negative.    Musculoskeletal: Negative.    Gastrointestinal:  Negative for bloating, abdominal pain, hematochezia and melena.   Neurological:  Negative for focal weakness.   Psychiatric/Behavioral: Negative.             Objective     Physical Exam  Vitals reviewed.   Constitutional:       General: He is not in acute distress.     Appearance: He is well-developed. He is not diaphoretic.   HENT:      Head: Normocephalic and atraumatic.   Eyes:      General:         Right eye: No discharge.         Left eye: No discharge.      Conjunctiva/sclera: Conjunctivae normal.   Cardiovascular:      Rate and Rhythm: Regular rhythm. Bradycardia present.      Heart sounds: No murmur heard.     No friction rub. No gallop.   Pulmonary:      Effort: Pulmonary effort is normal. No respiratory distress.      Breath sounds: Normal breath sounds. No wheezing or rales.   Abdominal:      General: Bowel sounds are normal. There is no distension.      Palpations: Abdomen is soft.      Tenderness: There is no abdominal tenderness.   Musculoskeletal:      Cervical back: Neck supple.   Skin:     General: Skin is warm and dry.   Neurological:      Mental Status: He is alert and oriented to person, place, and time.   Psychiatric:         Behavior: Behavior normal.         Thought Content: Thought content normal.         Judgment: Judgment normal.            Assessment and Plan     1. Persistent atrial fibrillation    2. New onset of congestive heart failure, in setting of atrial fibrillation    3. Essential hypertension        Plan:  In summary, Mr. Norris is a pleasant 64 year-old man with hypertension, aortic atherosclerosis observed on prior CT scan, prior DVT treated with coumadin, microscopic hematuria, and IBS presenting for follow-up of persistent AF with associated systolic cardiomyopathy now s/p PVI/LA posterior wall isolation (10/2024). EF normalized with sinus rhythm. Continue amiodarone for 2 more months. Recommend indefinite anticoagulation.    RTC in 6 months      Thank you for allowing me to participate in the care of this patient. Please do not hesitate to call me with any questions or concerns.    Sanchez Boucher MD, PhD  Cardiac  Electrophysiology

## 2025-01-24 ENCOUNTER — TELEPHONE (OUTPATIENT)
Dept: ELECTROPHYSIOLOGY | Facility: CLINIC | Age: 65
End: 2025-01-24
Payer: MEDICARE

## 2025-01-24 DIAGNOSIS — I48.19 PERSISTENT ATRIAL FIBRILLATION: ICD-10-CM

## 2025-01-24 RX ORDER — DABIGATRAN ETEXILATE 150 MG/1
150 CAPSULE ORAL 2 TIMES DAILY
Qty: 60 CAPSULE | Refills: 11 | Status: SHIPPED | OUTPATIENT
Start: 2025-01-24

## 2025-01-24 NOTE — TELEPHONE ENCOUNTER
----- Message from Med Assistant Norman sent at 1/24/2025  3:16 PM CST -----    ----- Message -----  From: Jose Bailey  Sent: 1/24/2025   2:08 PM CST  To: Sandre SHUKLA Staff    Regarding Refill : dabigatran etexilate (PRADAXA) 150 mg Cap  Patient also has some questions regarding another medication would like a call back at [phone].      [facility]/pharmacy #[id number] - [place] - [address]   Phone: [phone]  Fax: [phone]      Thank you

## 2025-01-24 NOTE — TELEPHONE ENCOUNTER
Spoke to pt. He said he has been without Pradaxa since he was without his truck. He would like the rx to be changed from [facility] to [facility] in [place] like his other prescriptions. Advised I would have the request submitted for him. Patient verbalized understanding and had no further questions.

## 2025-02-03 ENCOUNTER — OFFICE VISIT (OUTPATIENT)
Dept: DERMATOLOGY | Facility: CLINIC | Age: 65
End: 2025-02-03
Payer: MEDICARE

## 2025-02-03 DIAGNOSIS — L57.0 AK (ACTINIC KERATOSIS): ICD-10-CM

## 2025-02-03 DIAGNOSIS — L82.1 SK (SEBORRHEIC KERATOSIS): ICD-10-CM

## 2025-02-03 DIAGNOSIS — D22.9 NEVUS: ICD-10-CM

## 2025-02-03 DIAGNOSIS — L81.4 LENTIGO: Primary | ICD-10-CM

## 2025-02-03 PROCEDURE — 17000 DESTRUCT PREMALG LESION: CPT | Mod: PBBFAC,PO | Performed by: DERMATOLOGY

## 2025-02-03 PROCEDURE — 17003 DESTRUCT PREMALG LES 2-14: CPT | Mod: S$PBB,,, | Performed by: DERMATOLOGY

## 2025-02-03 PROCEDURE — 17003 DESTRUCT PREMALG LES 2-14: CPT | Mod: PBBFAC,PO | Performed by: DERMATOLOGY

## 2025-02-03 PROCEDURE — 99214 OFFICE O/P EST MOD 30 MIN: CPT | Mod: 25,S$PBB,, | Performed by: DERMATOLOGY

## 2025-02-03 PROCEDURE — 99213 OFFICE O/P EST LOW 20 MIN: CPT | Mod: PBBFAC,PO | Performed by: DERMATOLOGY

## 2025-02-03 PROCEDURE — 17000 DESTRUCT PREMALG LESION: CPT | Mod: S$PBB,,, | Performed by: DERMATOLOGY

## 2025-02-03 PROCEDURE — 99999 PR PBB SHADOW E&M-EST. PATIENT-LVL III: CPT | Mod: PBBFAC,,, | Performed by: DERMATOLOGY

## 2025-02-03 NOTE — PROGRESS NOTES
"  Subjective:      Patient ID:  Kevan Norris is a 64 y.o. male who presents for   Chief Complaint   Patient presents with    Skin Check     tbse     Patient is here today for a "TBSE" check.     Pt has a history of  extensive sun exposure in the past.   Pt recalls several blistering sunburns in the past- yes  Pt has history of tanning bed use- no  Pt has  had moles removed in the past- no  Pt has history of melanoma in first degree relatives-  no    Pt denies any new or changing lesions today           Review of Systems   Skin:  Negative for daily sunscreen use, activity-related sunscreen use, tendency to form keloidal scars and recent sunburn.   Hematologic/Lymphatic: Bruises/bleeds easily.       Objective:   Physical Exam   Constitutional: He appears well-developed and well-nourished. No distress.   Neurological: He is alert and oriented to person, place, and time. He is not disoriented.   Psychiatric: He has a normal mood and affect.   Skin:   Areas Examined (abnormalities noted in diagram):   Scalp / Hair Palpated and Inspected  Head / Face Inspection Performed  Neck Inspection Performed  Chest / Axilla Inspection Performed  Abdomen Inspection Performed  Genitals / Buttocks / Groin Inspection Performed  Back Inspection Performed  RUE Inspected  LUE Inspection Performed  RLE Inspected  LLE Inspection Performed  Nails and Digits Inspection Performed                           Diagram Legend     Erythematous scaling macule/papule c/w actinic keratosis       Vascular papule c/w angioma      Pigmented verrucoid papule/plaque c/w seborrheic keratosis      Yellow umbilicated papule c/w sebaceous hyperplasia      Irregularly shaped tan macule c/w lentigo     1-2 mm smooth white papules consistent with Milia      Movable subcutaneous cyst with punctum c/w epidermal inclusion cyst      Subcutaneous movable cyst c/w pilar cyst      Firm pink to brown papule c/w dermatofibroma      Pedunculated fleshy papule(s) c/w " skin tag(s)      Evenly pigmented macule c/w junctional nevus     Mildly variegated pigmented, slightly irregular-bordered macule c/w mildly atypical nevus      Flesh colored to evenly pigmented papule c/w intradermal nevus       Pink pearly papule/plaque c/w basal cell carcinoma      Erythematous hyperkeratotic cursted plaque c/w SCC      Surgical scar with no sign of skin cancer recurrence      Open and closed comedones      Inflammatory papules and pustules      Verrucoid papule consistent consistent with wart     Erythematous eczematous patches and plaques     Dystrophic onycholytic nail with subungual debris c/w onychomycosis     Umbilicated papule    Erythematous-base heme-crusted tan verrucoid plaque consistent with inflamed seborrheic keratosis     Erythematous Silvery Scaling Plaque c/w Psoriasis     See annotation      Assessment / Plan:        Lentigo  This is a benign hyperpigmented sun induced lesion. Recommend daily sun protection/avoidance and use of at least SPF 30, broad spectrum sunscreen (OTC drug) will reduce the number of new lesions. Treatment of these benign lesions are considered cosmetic.  The nature of sun-induced photo-aging and skin cancers is discussed.  Sun avoidance, protective clothing, and the use of 30-SPF sunscreens is advised. Observe closely for skin damage/changes, and call if such occurs.    AK (actinic keratosis)  Cryosurgery Procedure Note    Verbal consent from the patient is obtained including, but not limited to, risk of hypopigmentation/hyperpigmentation, scar, recurrence of lesion. The patient is aware of the precancerous quality and need for treatment of these lesions. Liquid nitrogen cryosurgery is applied to the 10 actinic keratoses, as detailed in the physical exam, to produce a freeze injury. The patient is aware that blisters may form and is instructed on wound care with gentle cleansing and use of vaseline ointment to keep moist until healed. The patient is  supplied a handout on cryosurgery and is instructed to call if lesions do not completely resolve.    -     Ambulatory referral/consult to Dermatology  -     Photodynamic Therapy; Future  Will schedule PDT for numerous (>15) actinic keratoses on scalp  with an incubation time of 90 minutes. Counseled patient about photodynamic therapy and that (s)he should avoid sunlight and bright artificial light for 48 hours after the procedure. After that, vigilant sun protection and sunscreen should be used on a daily basis. Skin will be red and peeling for about 5-7 days after the procedure, and rarely for 2 weeks. Discussed that some patients require repeat treatments or maintenance treatments.    SK (seborrheic keratosis)  These are benign inherited growths without a malignant potential. Reassurance given to patient. No treatment is necessary.     Nevus  Discussed ABCDE's of nevi.  Monitor for new mole or moles that are becoming bigger, darker, irritated, or developing irregular borders. Brochure provided. Instructed patient to observe lesion(s) for changes and follow up in clinic if changes are noted. Patient to monitor skin at home for new or changing lesions.       Total body skin examination performed today including at least 12 points as noted in physical examination. No lesions suspicious for malignancy noted.    Recommend daily sun protection/avoidance, use of at least SPF 30, broad spectrum sunscreen (OTC drug), skin self examinations, and routine physician surveillance to optimize early detection             Follow up in about 6 months (around 8/3/2025) for UBSE, scalp PDT follow up.

## 2025-02-03 NOTE — PATIENT INSTRUCTIONS
We would like to see you back in the clinic in 6 months.  You will be able to schedule this appointment by calling or by using your [facility] portal 3 months before this time. Because our schedule fills so quickly, please set a reminder in your phone or on your calendar to schedule 3 months before you are due to come in so that we can see you in a timely fashion.  You should also receive a reminder from us in the mail. This will help us ensure we can continue to provide excellent healthcare for you. Thank you.      Will schedule PDT for numerous (>15) actinic keratoses on scalp  with an incubation time of 90 minutes. Counseled patient about photodynamic therapy and that (s)he should avoid sunlight and bright artificial light for 48 hours after the procedure. After that, vigilant sun protection and sunscreen should be used on a daily basis. Skin will be red and peeling for about 5-7 days after the procedure, and rarely for 2 weeks. Discussed that some patients require repeat treatments or maintenance treatments.

## 2025-02-14 ENCOUNTER — OFFICE VISIT (OUTPATIENT)
Dept: CARDIOLOGY | Facility: CLINIC | Age: 65
End: 2025-02-14
Payer: MEDICARE

## 2025-02-14 VITALS
DIASTOLIC BLOOD PRESSURE: 92 MMHG | BODY MASS INDEX: 28.51 KG/M2 | WEIGHT: 246.38 LBS | SYSTOLIC BLOOD PRESSURE: 141 MMHG | HEIGHT: 78 IN | HEART RATE: 63 BPM | OXYGEN SATURATION: 95 %

## 2025-02-14 DIAGNOSIS — I48.0 PAROXYSMAL ATRIAL FIBRILLATION: ICD-10-CM

## 2025-02-14 DIAGNOSIS — I83.812 VARICOSE VEINS OF LEFT LOWER EXTREMITY WITH PAIN: ICD-10-CM

## 2025-02-14 DIAGNOSIS — I48.91 NEW ONSET ATRIAL FIBRILLATION: ICD-10-CM

## 2025-02-14 DIAGNOSIS — I50.9 NEW ONSET OF CONGESTIVE HEART FAILURE: ICD-10-CM

## 2025-02-14 DIAGNOSIS — I70.0 AORTIC ATHEROSCLEROSIS: ICD-10-CM

## 2025-02-14 DIAGNOSIS — I10 ESSENTIAL HYPERTENSION: Primary | ICD-10-CM

## 2025-02-14 DIAGNOSIS — Z79.01 LONG TERM (CURRENT) USE OF ANTICOAGULANTS: ICD-10-CM

## 2025-02-14 DIAGNOSIS — Z86.718 HISTORY OF DVT (DEEP VEIN THROMBOSIS): ICD-10-CM

## 2025-02-14 DIAGNOSIS — I87.2 VENOUS (PERIPHERAL) INSUFFICIENCY: ICD-10-CM

## 2025-02-14 PROCEDURE — 99999 PR PBB SHADOW E&M-EST. PATIENT-LVL II: CPT | Mod: PBBFAC,,, | Performed by: STUDENT IN AN ORGANIZED HEALTH CARE EDUCATION/TRAINING PROGRAM

## 2025-02-14 PROCEDURE — 99212 OFFICE O/P EST SF 10 MIN: CPT | Mod: PBBFAC,PN | Performed by: STUDENT IN AN ORGANIZED HEALTH CARE EDUCATION/TRAINING PROGRAM

## 2025-02-14 NOTE — PROGRESS NOTES
Subjective:   @Patient ID:  Kevan Norris is a 64 y.o. male who presents for evaluation of No chief complaint on file.      HPI:   Mr Norris is pleasant 63 year-old man with venous insufficiency, hypertension, Afib s/p PV RFA , aortic atherosclerosis observed on prior CT scan, prior DVT treated with coumadin, microscopic hematuria, and IBS  here for follow up. He is feeling great but still c/o his LE edema+varicose veins. Currently wearing compression stockings which he will give a try x3 months.  Denies cp, sob, palpitations, presyncope/dizziness, syncope, orthopnea, PND, bendopnea, decrease ET, NVDC, fever, chills.    CEAP C4 right leg  CEAP C6 left leg    07/2024    Left Ventricle: The left ventricle is mildly dilated. Normal wall thickness. There is normal systolic function with a visually estimated ejection fraction of 55 - 60%. Grade I diastolic dysfunction.    Right Ventricle: Normal right ventricular cavity size. Systolic function is normal.    Left Atrium: Left atrium is moderately dilated.    Right Atrium: Right atrium is mildly dilated.    Aortic Valve: There is mild aortic valve sclerosis. There is mild annular calcification present.    Mitral Valve: Mildly thickened leaflets.    Tricuspid Valve: There is mild regurgitation.    Pulmonary Artery: The estimated pulmonary artery systolic pressure is 30 mmHg.    IVC/SVC: Normal venous pressure at 3 mmHg.    US LEV insu. 05/2024      There is no evidence of a right lower extremity DVT.    There is no evidence of a left lower extremity DVT.    The right greater saphenous vein has reflux.    The right femoral vein and right popliteal have significant reflux.  Very dilated veins    The left superficial femoral middle vein has reflux.    The left greater saphenous vein has reflux.    Severe bilateral varicosities with significant reflux    Significant superficial and deep reflux noted           Patient Active Problem List    Diagnosis Date Noted    Long term  "(current) use of anticoagulants 08/14/2024    New onset of congestive heart failure, in setting of atrial fibrillation 07/16/2024    Aortic atherosclerosis 04/26/2024    Persistent atrial fibrillation 04/25/2024    Diverticulosis in the sigmoid colon, in the desending colon, and the transverse colon 12/15/2022    Venous insufficiency of lower extremity 06/15/2022    Asymptomatic microscopic hematuria 12/05/2021    Vitamin D deficiency 12/05/2021    Anemia due to vitamin B12 deficiency 12/05/2021    Other hyperlipidemia 12/02/2021    Chronic gout without tophus 12/02/2021    GERD without esophagitis 12/02/2021    Essential hypertension 12/02/2021    BMI 27.0-27.9,adult 12/02/2021    History of DVT (deep vein thrombosis) 12/02/2021    Varicose veins of left lower extremity with pain 12/02/2021    Umbilical hernia without obstruction and without gangrene 12/02/2021                    LABS  CBC  @LABRCNTIP(WBC:3,RBC:3,HGB:3,HCT:3,PLT:3,MCV:3,MCH:3,MCHC:3)@  BMP  @LABRCNTIP(NA:3,K:3,CO2:3,CL:3,BUN:3,Creatinine:3,GLU:3,GFR:3)@    POCT-Glucose  No results found for: "POCTGLUCOSE"    @LABRCNTIP(Calcium:3,MG:3,PHOS:3)@  LFT  @LABRCNTIP(PROT:3,Albumin:3,,Bilitot:3,AST:3,Alkphos:3,ALT:3)@  GFR     COAGS  @LABRCNTIP(PT:3,INR:3,APTT:3)@  CE  @LABRCNTIP(troponini:3,cktotal:3,ckmb:3)@  ABGs  @AUXYCQECL89(PH,PCO2,PO2,HCO3,POCSATURATED,BE)@  BNP  @LABRCNTIP(BNP:3)@    LAST HbA1c  Lab Results   Component Value Date    HGBA1C 5.4 04/23/2024       Lipid panel  Lab Results   Component Value Date    CHOL 161 04/23/2024    CHOL 141 12/13/2022    CHOL 173 06/07/2022     Lab Results   Component Value Date    HDL 52 04/23/2024    HDL 44 12/13/2022    HDL 39 (L) 06/07/2022     Lab Results   Component Value Date    LDLCALC 91.0 04/23/2024    LDLCALC 86.2 12/13/2022    LDLCALC 96.4 06/07/2022     Lab Results   Component Value Date    TRIG 90 04/23/2024    TRIG 54 12/13/2022    TRIG 188 (H) 06/07/2022     Lab Results   Component Value Date    " CHOLHDL 32.3 04/23/2024    CHOLHDL 31.2 12/13/2022    CHOLHDL 22.5 06/07/2022            Review of Systems   All other systems reviewed and are negative.      Objective:   General: No acute stress  HENT: EOM intact, PERRL, oropharynx clear, mucous membranes clear and moist   Pulmonary: CTAB  Cardiovascular: IRIR  Abdomen: soft, non-tender, no distended no splenomegaly or hepatomegaly   Skin: warm, dry, no erythema, no rashes    Extremities: Edema (change in color)  Neuro: a/ox4, clear speech, follows commands, no weakness or focal neurologic  deficit   Psych: mood and behavior normal       Assessment:     1. Essential hypertension    2. Varicose veins of left lower extremity with pain    3. Aortic atherosclerosis    4. Venous (peripheral) insufficiency    5. New onset atrial fibrillation    6. Long term (current) use of anticoagulants    7. New onset of congestive heart failure    8. History of DVT (deep vein thrombosis)    9. Paroxysmal atrial fibrillation            Plan:   Afib s/p RFA--> controlled. Continue current medications regimen.+ Pradaxa.Echo ordered post procedure.   HTN- BP not at goal but appears to be an outlier. continue current medications  PVD-CEAP 4-5. Currently using compression stocking now with swelling, and chronic changes. Will give a trial of compression stockings for 3 months -->US. Likely will need venous ablation.    HLD continue statin   Target BP < 130/80 mmHg    Continue with current medical plan and lifestyle changes.  Compression stocking 20-30 for venous insufficiency if no improvement  Elevate legs while sitting  If there is no improvement she will have an insufficiency ultrasound for further evaluation     Weight loss  Exercise    Continue with current medical plan and lifestyle changes.  Return sooner for concerns or questions. If symptoms persist go to the ED  I have reviewed all pertinent data on this patient       She expressed verbal understanding and agreed with the  "plan      Follow up as scheduled. Return sooner for concerns or questions      I have reviewed the patient's medical history in detail and updated the computerized patient record.    Orders Placed This Encounter   Procedures    CV US Lower Extremity Veins Bilateral Insufficiency     Standing Status:   Future     Expiration Date:   2/14/2026     Release to patient:   Immediate    Echo     Standing Status:   Future     Expiration Date:   2/14/2026     Release to patient:   Immediate       Follow up as scheduled. Return sooner for concerns or questions            He expressed verbal understanding and agreed with the plan        Patient's Medications   New Prescriptions    No medications on file   Previous Medications    AMIODARONE (PACERONE) 200 MG TAB    Take 1 tablet (200 mg total) by mouth once daily.    CARVEDILOL (COREG) 6.25 MG TABLET    Take 1 tablet (6.25 mg total) by mouth 2 (two) times daily with meals.    CYANOCOBALAMIN, VITAMIN B-12, 2,500 MCG TAB    Take 5,000 mcg by mouth once daily.    DABIGATRAN ETEXILATE (PRADAXA) 150 MG CAP    Take 1 capsule (150 mg total) by mouth 2 (two) times a day. "Do NOT break, chew, or open capsules."    ERGOCALCIFEROL (ERGOCALCIFEROL) 50,000 UNIT CAP    TAKE 1 CAPSULE BY MOUTH EVERY 7 DAYS.    KETOCONAZOLE (NIZORAL) 2 % CREAM    AAA bid x 3 weeks    LOSARTAN (COZAAR) 50 MG TABLET    Take 1 tablet (50 mg total) by mouth every evening. For blood pressure.    PANTOPRAZOLE (PROTONIX) 40 MG TABLET    Take 1 tablet (40 mg total) by mouth once daily.    ROSUVASTATIN (CRESTOR) 5 MG TABLET    Take 1 tablet (5 mg total) by mouth once daily. For cholesterol   Modified Medications    No medications on file   Discontinued Medications    No medications on file        Dima Martin MD  Cardiovascular Disease [facility]        "

## 2025-02-17 ENCOUNTER — CLINICAL SUPPORT (OUTPATIENT)
Dept: DERMATOLOGY | Facility: CLINIC | Age: 65
End: 2025-02-17
Payer: MEDICARE

## 2025-02-17 DIAGNOSIS — L57.0 AK (ACTINIC KERATOSIS): ICD-10-CM

## 2025-02-17 PROCEDURE — 96567 PDT DSTR PRMLG LES SKN: CPT | Mod: PBBFAC

## 2025-02-17 PROCEDURE — 96372 THER/PROPH/DIAG INJ SC/IM: CPT | Mod: PBBFAC

## 2025-02-17 PROCEDURE — 99212 OFFICE O/P EST SF 10 MIN: CPT | Mod: PBBFAC

## 2025-02-17 PROCEDURE — 99999PBSHW PR PBB SHADOW TECHNICAL ONLY FILED TO HB: Mod: PBBFAC,,,

## 2025-02-17 NOTE — PATIENT INSTRUCTIONS
PHOTODYNAMIC THERAPY POST-OP INSTRUCTIONS      Great Job! You have finished your Parth-Light treatment. The treatment you have completed will destroy precancerous cells in the skin and reduce your risk of developing skin cancer in the future.    What to expect when you leave the office:  You may notice some redness and swelling similar to a sunburn. This is normal and typical with the Parth-Light procedure. In some cases, crusting or blistering of the skin that was treated may occur. This also is normal, and is actually a good sign that the treatment is working to destroy cancerous and precancerous cells. These symptoms typically resolve within a few days, but in some cases more robust reactions may last as long as 1-2 weeks.    Daily Instructions:  Your skin will be very sensitive to sunlight for up to 7 days. You will need to wear sunscreen (SPF30 or higher), as well as clothing that will protect your skin from the sun (wide-brimmed hat, long-sleeved shirts, etc). We recommend that you plan to use sunscreen forever! You must avoid direct sunlight for 48 hours following PDT treatment. Window light should also be avoided during the first 48 hours.    At Night:  If your skin is dry/red/tender/crusting, apply Vaseline/petroleum jelly to the area to moisturize the skin. If needed, you may also soak the treatment area in Epson Salt/[place] bath for a few minutes to help relieve itching and remove crust or scale.    Itching/ Burning Sensations can be treated by:  Keeping the treatment area covered with a layer of Vaseline/Pretoleum jelly/aquaphor.  Extra strength Tylenol and Benadryl  Epson Salt or [place] Soaks    Future Appointments:  Remember while the Parth-Light treatment greatly reduces your risk of skin cancer and precancerous growths, it does not eliminate the risk completely. It remains important you have close follow-up with your dermatologist.     You should still have regular exams to detect skin cancer as early as  possible and treat precancers not destroyed by the Parth-Light treatment. A follow up appointment should be made 1 to 3 months following your Parth-Light treatment.    Call us with any questions or problems at [phone]              POST PDT       Do not pick at the skin while it is peeling.    Use gentle unscented mild cleanser such as cetaphil.   Ibuprofen can be taken as directed for any pain or discomfort.   Do not let skin dry out on the treated area. Apply a thin layer of Aquaphor or    Vaseline jelly   frequently / as needed.   Keep treated skin away from direct sunlight (outside and windows) for 48    hours after treatment.   Apply SPF everyday   Notify our office if you have any questions or concerns at [phone].

## 2025-02-17 NOTE — PROGRESS NOTES
Photodynamic Therapy Note.     PDT ordered per Dr. Nicole     Patient here today for treatment of actinic keratoses using photodynamic therapy.  Risks including but not limited to burning, stinging, redness, swelling, crusting or blistering of the skin of the area treated were discussed with patient.  Patient elects to proceed with photodynamic therapy.     Treatment area:  scalp  Treatment area cleaned with rubbing alcohol, 1 Levulan Kerastick (NDC: [id number]) applied evenly to entire surface and allowed to absorb for 90 minutes.  Patient then placed under Parth-U light for 16 minutes 40 seconds.     Patient tolerated treatment well with only mild but tolerable symptoms of discomfort.  Area washed gently with mild soap and water; Zinc oxide sunscreen applied.  Patient advised to avoid any significant light exposure (sun and artificial) for next 48 hours.     RTC:  In 1 month or sooner if any problems arise.

## 2025-02-19 ENCOUNTER — TELEPHONE (OUTPATIENT)
Dept: DERMATOLOGY | Facility: CLINIC | Age: 65
End: 2025-02-19
Payer: MEDICARE

## 2025-03-18 ENCOUNTER — HOSPITAL ENCOUNTER (OUTPATIENT)
Dept: CARDIOLOGY | Facility: HOSPITAL | Age: 65
Discharge: HOME | End: 2025-03-18
Attending: STUDENT IN AN ORGANIZED HEALTH CARE EDUCATION/TRAINING PROGRAM
Payer: MEDICARE

## 2025-03-18 VITALS — HEIGHT: 78 IN | WEIGHT: 246 LBS | BODY MASS INDEX: 28.46 KG/M2

## 2025-03-18 DIAGNOSIS — I87.2 VENOUS (PERIPHERAL) INSUFFICIENCY: ICD-10-CM

## 2025-03-18 DIAGNOSIS — I83.812 VARICOSE VEINS OF LEFT LOWER EXTREMITY WITH PAIN: ICD-10-CM

## 2025-03-18 DIAGNOSIS — Z79.01 LONG TERM (CURRENT) USE OF ANTICOAGULANTS: ICD-10-CM

## 2025-03-18 DIAGNOSIS — I48.91 NEW ONSET ATRIAL FIBRILLATION: ICD-10-CM

## 2025-03-18 PROCEDURE — 93306 TTE W/DOPPLER COMPLETE: CPT

## 2025-03-18 PROCEDURE — 93306 TTE W/DOPPLER COMPLETE: CPT | Mod: 26,,, | Performed by: INTERNAL MEDICINE

## 2025-03-19 LAB
APICAL FOUR CHAMBER EJECTION FRACTION: 64 %
APICAL TWO CHAMBER EJECTION FRACTION: 67 %
ASCENDING AORTA: 3.82 CM
AV INDEX (PROSTH): 0.61
AV MEAN GRADIENT: 7 MMHG
AV PEAK GRADIENT: 13 MMHG
AV VALVE AREA BY VELOCITY RATIO: 3 CM²
AV VALVE AREA: 3 CM²
AV VELOCITY RATIO: 0.61
BSA FOR ECHO PROCEDURE: 2.49 M2
CV ECHO LV RWT: 0.39 CM
DOP CALC AO PEAK VEL: 1.8 M/S
DOP CALC AO VTI: 38.8 CM
DOP CALC LVOT AREA: 4.9 CM2
DOP CALC LVOT DIAMETER: 2.5 CM
DOP CALC LVOT PEAK VEL: 1.1 M/S
DOP CALC LVOT STROKE VOLUME: 115.8 CM3
DOP CALC MV VTI: 24.4 CM
DOP CALCLVOT PEAK VEL VTI: 23.6 CM
E WAVE DECELERATION TIME: 243 MSEC
E/A RATIO: 0.75
E/E' RATIO: 11 M/S
ECHO LV POSTERIOR WALL: 1.1 CM (ref 0.6–1.1)
FRACTIONAL SHORTENING: 33.9 % (ref 28–44)
HR MV ECHO: 63 BPM
INTERVENTRICULAR SEPTUM: 1.2 CM (ref 0.6–1.1)
IVC DIAMETER: 1.87 CM
LEFT ATRIUM AREA SYSTOLIC (APICAL 2 CHAMBER): 25.39 CM2
LEFT ATRIUM AREA SYSTOLIC (APICAL 4 CHAMBER): 16.38 CM2
LEFT ATRIUM VOLUME INDEX MOD: 26 ML/M2
LEFT ATRIUM VOLUME MOD: 64 ML
LEFT INTERNAL DIMENSION IN SYSTOLE: 3.7 CM (ref 2.1–4)
LEFT VENTRICLE DIASTOLIC VOLUME INDEX: 60.64 ML/M2
LEFT VENTRICLE DIASTOLIC VOLUME: 151 ML
LEFT VENTRICLE END DIASTOLIC VOLUME APICAL 2 CHAMBER: 135.76 ML
LEFT VENTRICLE END DIASTOLIC VOLUME APICAL 4 CHAMBER: 153.46 ML
LEFT VENTRICLE END SYSTOLIC VOLUME APICAL 2 CHAMBER: 87.89 ML
LEFT VENTRICLE END SYSTOLIC VOLUME APICAL 4 CHAMBER: 41.29 ML
LEFT VENTRICLE MASS INDEX: 106.3 G/M2
LEFT VENTRICLE SYSTOLIC VOLUME INDEX: 23.3 ML/M2
LEFT VENTRICLE SYSTOLIC VOLUME: 58 ML
LEFT VENTRICULAR INTERNAL DIMENSION IN DIASTOLE: 5.6 CM (ref 3.5–6)
LEFT VENTRICULAR MASS: 264.7 G
LV LATERAL E/E' RATIO: 12 M/S
LV SEPTAL E/E' RATIO: 10 M/S
LVED V (TEICH): 151.22 ML
LVES V (TEICH): 58.34 ML
LVOT MG: 2.48 MMHG
LVOT MV: 0.73 CM/S
MR PISA EROA: 0.2 CM2
MV A" WAVE DURATION": 121.79 MSEC
MV MEAN GRADIENT: 1 MMHG
MV PEAK A VEL: 0.8 M/S
MV PEAK E VEL: 0.6 M/S
MV PEAK GRADIENT: 3 MMHG
MV STENOSIS PRESSURE HALF TIME: 70.5 MS
MV VALVE AREA BY CONTINUITY EQUATION: 4.75 CM2
MV VALVE AREA P 1/2 METHOD: 3.12 CM2
OHS CV RV/LV RATIO: 0.82 CM
OHS LV EJECTION FRACTION SIMPSONS BIPLANE MOD: 65 %
PISA MRMAX VEL: 3.34 M/S
PISA RADIUS: 0.58 CM
PISA TR MAX VEL: 2.8 M/S
PISA VN NYQUIST MS: 0.31 M/S
PISA VN NYQUIST: 0.31 M/S
PULM VEIN S/D RATIO: 1.28
PV MV: 1.15 M/S
PV PEAK D VEL: 0.47 M/S
PV PEAK GRADIENT: 10 MMHG
PV PEAK S VEL: 0.6 M/S
PV PEAK VELOCITY: 1.62 M/S
RA PRESSURE ESTIMATED: 3 MMHG
RA VOL SYS: 78.7 ML
RIGHT ATRIAL AREA: 23.8 CM2
RIGHT ATRIUM VOLUME AREA LENGTH APICAL 4 CHAMBER: 73.17 ML
RIGHT VENTRICLE DIASTOLIC BASEL DIMENSION: 4.6 CM
RV TB RVSP: 6 MMHG
RV TISSUE DOPPLER FREE WALL SYSTOLIC VELOCITY 1 (APICAL 4 CHAMBER VIEW): 19.16 CM/S
SINUS: 3.57 CM
STJ: 3.44 CM
TDI LATERAL: 0.05 M/S
TDI SEPTAL: 0.06 M/S
TDI: 0.06 M/S
TR MAX PG: 31 MMHG
TRICUSPID ANNULAR PLANE SYSTOLIC EXCURSION: 3.13 CM
TV REST PULMONARY ARTERY PRESSURE: 34 MMHG
Z-SCORE OF LEFT VENTRICULAR DIMENSION IN END DIASTOLE: -8.85
Z-SCORE OF LEFT VENTRICULAR DIMENSION IN END SYSTOLE: -6.15

## 2025-03-23 ENCOUNTER — RESULTS FOLLOW-UP (OUTPATIENT)
Dept: CARDIOLOGY | Facility: CLINIC | Age: 65
End: 2025-03-23

## 2025-05-01 ENCOUNTER — RESULTS FOLLOW-UP (OUTPATIENT)
Dept: FAMILY MEDICINE | Facility: CLINIC | Age: 65
End: 2025-05-01

## 2025-05-01 ENCOUNTER — LAB VISIT (OUTPATIENT)
Dept: LAB | Facility: HOSPITAL | Age: 65
End: 2025-05-01
Attending: FAMILY MEDICINE
Payer: MEDICARE

## 2025-05-01 DIAGNOSIS — D51.8 OTHER VITAMIN B12 DEFICIENCY ANEMIA: ICD-10-CM

## 2025-05-01 DIAGNOSIS — Z12.5 SCREENING PSA (PROSTATE SPECIFIC ANTIGEN): ICD-10-CM

## 2025-05-01 DIAGNOSIS — E78.49 OTHER HYPERLIPIDEMIA: ICD-10-CM

## 2025-05-01 DIAGNOSIS — E55.9 VITAMIN D DEFICIENCY: ICD-10-CM

## 2025-05-01 DIAGNOSIS — I10 ESSENTIAL HYPERTENSION: ICD-10-CM

## 2025-05-01 DIAGNOSIS — R79.89 ABNORMAL CBC: ICD-10-CM

## 2025-05-01 LAB
25(OH)D3+25(OH)D2 SERPL-MCNC: 22 NG/ML (ref 30–96)
ABSOLUTE EOSINOPHIL (OHS): 0.13 K/UL
ABSOLUTE MONOCYTE (OHS): 0.56 K/UL (ref 0.3–1)
ABSOLUTE NEUTROPHIL COUNT (OHS): 3.12 K/UL (ref 1.8–7.7)
ALBUMIN SERPL BCP-MCNC: 3.8 G/DL (ref 3.5–5.2)
ALP SERPL-CCNC: 54 UNIT/L (ref 40–150)
ALT SERPL W/O P-5'-P-CCNC: 16 UNIT/L (ref 10–44)
ANION GAP (OHS): 10 MMOL/L (ref 8–16)
AST SERPL-CCNC: 16 UNIT/L (ref 11–45)
BASOPHILS # BLD AUTO: 0.07 K/UL
BASOPHILS NFR BLD AUTO: 1.3 %
BILIRUB SERPL-MCNC: 0.7 MG/DL (ref 0.1–1)
BUN SERPL-MCNC: 17 MG/DL (ref 8–23)
CALCIUM SERPL-MCNC: 9.4 MG/DL (ref 8.7–10.5)
CHLORIDE SERPL-SCNC: 102 MMOL/L (ref 95–110)
CHOLEST SERPL-MCNC: 150 MG/DL (ref 120–199)
CHOLEST/HDLC SERPL: 3 {RATIO} (ref 2–5)
CO2 SERPL-SCNC: 26 MMOL/L (ref 23–29)
CREAT SERPL-MCNC: 0.9 MG/DL (ref 0.5–1.4)
EAG (OHS): 105 MG/DL (ref 68–131)
ERYTHROCYTE [DISTWIDTH] IN BLOOD BY AUTOMATED COUNT: 13.7 % (ref 11.5–14.5)
FOLATE SERPL-MCNC: 10.9 NG/ML (ref 4–24)
GFR SERPLBLD CREATININE-BSD FMLA CKD-EPI: >60 ML/MIN/1.73/M2
GLUCOSE SERPL-MCNC: 107 MG/DL (ref 70–110)
HBA1C MFR BLD: 5.3 % (ref 4–5.6)
HCT VFR BLD AUTO: 43 % (ref 40–54)
HDLC SERPL-MCNC: 50 MG/DL (ref 40–75)
HDLC SERPL: 33.3 % (ref 20–50)
HGB BLD-MCNC: 13.4 GM/DL (ref 14–18)
IMM GRANULOCYTES # BLD AUTO: 0.01 K/UL (ref 0–0.04)
IMM GRANULOCYTES NFR BLD AUTO: 0.2 % (ref 0–0.5)
LDLC SERPL CALC-MCNC: 86.6 MG/DL (ref 63–159)
LYMPHOCYTES # BLD AUTO: 1.39 K/UL (ref 1–4.8)
MCH RBC QN AUTO: 28.9 PG (ref 27–31)
MCHC RBC AUTO-ENTMCNC: 31.2 G/DL (ref 32–36)
MCV RBC AUTO: 93 FL (ref 82–98)
NONHDLC SERPL-MCNC: 100 MG/DL
NUCLEATED RBC (/100WBC) (OHS): 0 /100 WBC
PLATELET # BLD AUTO: 198 K/UL (ref 150–450)
PMV BLD AUTO: 10.8 FL (ref 9.2–12.9)
POTASSIUM SERPL-SCNC: 4.4 MMOL/L (ref 3.5–5.1)
PROT SERPL-MCNC: 7.4 GM/DL (ref 6–8.4)
PSA SERPL-MCNC: 0.99 NG/ML
RBC # BLD AUTO: 4.64 M/UL (ref 4.6–6.2)
RELATIVE EOSINOPHIL (OHS): 2.5 %
RELATIVE LYMPHOCYTE (OHS): 26.3 % (ref 18–48)
RELATIVE MONOCYTE (OHS): 10.6 % (ref 4–15)
RELATIVE NEUTROPHIL (OHS): 59.1 % (ref 38–73)
SODIUM SERPL-SCNC: 138 MMOL/L (ref 136–145)
TRIGL SERPL-MCNC: 67 MG/DL (ref 30–150)
TSH SERPL-ACNC: 2.19 UIU/ML (ref 0.4–4)
VIT B12 SERPL-MCNC: 397 PG/ML (ref 210–950)
WBC # BLD AUTO: 5.28 K/UL (ref 3.9–12.7)

## 2025-05-01 PROCEDURE — 82746 ASSAY OF FOLIC ACID SERUM: CPT

## 2025-05-01 PROCEDURE — 82607 VITAMIN B-12: CPT

## 2025-05-01 PROCEDURE — 84443 ASSAY THYROID STIM HORMONE: CPT

## 2025-05-01 PROCEDURE — 85025 COMPLETE CBC W/AUTO DIFF WBC: CPT

## 2025-05-01 PROCEDURE — 82465 ASSAY BLD/SERUM CHOLESTEROL: CPT

## 2025-05-01 PROCEDURE — 84295 ASSAY OF SERUM SODIUM: CPT

## 2025-05-01 PROCEDURE — 83036 HEMOGLOBIN GLYCOSYLATED A1C: CPT

## 2025-05-01 PROCEDURE — 36415 COLL VENOUS BLD VENIPUNCTURE: CPT | Mod: PO

## 2025-05-01 PROCEDURE — 84153 ASSAY OF PSA TOTAL: CPT

## 2025-05-01 PROCEDURE — 82306 VITAMIN D 25 HYDROXY: CPT

## 2025-05-05 ENCOUNTER — OFFICE VISIT (OUTPATIENT)
Dept: FAMILY MEDICINE | Facility: CLINIC | Age: 65
End: 2025-05-05
Payer: MEDICARE

## 2025-05-05 VITALS
OXYGEN SATURATION: 97 % | HEIGHT: 78 IN | TEMPERATURE: 97 F | HEART RATE: 72 BPM | BODY MASS INDEX: 28.57 KG/M2 | SYSTOLIC BLOOD PRESSURE: 118 MMHG | WEIGHT: 246.94 LBS | DIASTOLIC BLOOD PRESSURE: 78 MMHG

## 2025-05-05 DIAGNOSIS — E55.9 VITAMIN D DEFICIENCY: ICD-10-CM

## 2025-05-05 DIAGNOSIS — K21.9 GERD WITHOUT ESOPHAGITIS: ICD-10-CM

## 2025-05-05 DIAGNOSIS — I48.19 PERSISTENT ATRIAL FIBRILLATION: ICD-10-CM

## 2025-05-05 DIAGNOSIS — I10 ESSENTIAL HYPERTENSION: ICD-10-CM

## 2025-05-05 DIAGNOSIS — I83.812 VARICOSE VEINS OF LEFT LOWER EXTREMITY WITH PAIN: ICD-10-CM

## 2025-05-05 DIAGNOSIS — E78.49 OTHER HYPERLIPIDEMIA: Primary | ICD-10-CM

## 2025-05-05 DIAGNOSIS — D64.9 NORMOCYTIC ANEMIA: ICD-10-CM

## 2025-05-05 PROCEDURE — 99999 PR PBB SHADOW E&M-EST. PATIENT-LVL IV: CPT | Mod: PBBFAC,,, | Performed by: FAMILY MEDICINE

## 2025-05-05 PROCEDURE — 99214 OFFICE O/P EST MOD 30 MIN: CPT | Mod: PBBFAC,PO | Performed by: FAMILY MEDICINE

## 2025-05-05 PROCEDURE — 99214 OFFICE O/P EST MOD 30 MIN: CPT | Mod: S$PBB,,, | Performed by: FAMILY MEDICINE

## 2025-05-05 RX ORDER — PANTOPRAZOLE SODIUM 40 MG/1
40 TABLET, DELAYED RELEASE ORAL DAILY
Qty: 90 TABLET | Refills: 1 | Status: SHIPPED | OUTPATIENT
Start: 2025-05-05 | End: 2026-05-05

## 2025-05-05 RX ORDER — LOSARTAN POTASSIUM 25 MG/1
25 TABLET ORAL NIGHTLY
Qty: 90 TABLET | Refills: 1 | Status: SHIPPED | OUTPATIENT
Start: 2025-05-05 | End: 2026-05-05

## 2025-05-05 RX ORDER — ERGOCALCIFEROL 1.25 MG/1
CAPSULE ORAL
Qty: 12 CAPSULE | Refills: 3 | Status: SHIPPED | OUTPATIENT
Start: 2025-05-05

## 2025-05-05 RX ORDER — CARVEDILOL 6.25 MG/1
6.25 TABLET ORAL 2 TIMES DAILY WITH MEALS
Qty: 180 TABLET | Refills: 3 | Status: SHIPPED | OUTPATIENT
Start: 2025-05-05 | End: 2026-05-05

## 2025-05-05 RX ORDER — ROSUVASTATIN CALCIUM 5 MG/1
5 TABLET, COATED ORAL DAILY
Qty: 90 TABLET | Refills: 3 | Status: SHIPPED | OUTPATIENT
Start: 2025-05-05

## 2025-05-05 NOTE — PROGRESS NOTES
"Subjective:       Patient ID: Kevan Norris is a 64 y.o. male.    Chief Complaint: Annual Exam      Kevan Norris is a 64 y.o. 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   Exercise: Walking outside of work. Also on his feet all day for work at his job.     Labs: reviewed and ordered    Colon Cancer Screening: Last Colonoscopy completed on 10/13/2022. Repeat in 7 years.     A fib: seeing EP. Had ablation. Is currently taking the amiodarone. Messaged EP. Due for f/u. On pradexa.   HTN: on losartan not taking coreg.  BP well controlled today. Doesn't check pressures at home. May benefit from BB, will restart.   GERD: on protonix. No symptoms.   DLD: on crestor 5 mg.   Low vitamin D: on vitamin D weekly. Trending up.   b12 def: repeat 4/2025 slightly low. Normal ESR/CRP in the past. Ran out of his medication recently.   Venous stasis ulcer: was seeing wound care. Has been d/c. No ulcer currently. Still has some VI. Seeing cardiology. Appointment to get fitted for compression stockings? I am unsure about this. Has apt with cardiology already pending.     No issues with hernia or diverticulitis.     Hematuria: saw urology, had repeat cystoscopy 1/18/2022. 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 No children. Two sisters, two brothers. One sister lives in [facility]. One sister lives in [place]. One dog at home.         Review of Systems   Constitutional:  Negative for appetite change, fatigue and fever.   HENT:  Negative for trouble swallowing.    Respiratory:  Negative for chest tightness and shortness of breath.  "   Cardiovascular:  Negative for chest pain, palpitations and leg swelling.   Gastrointestinal:  Positive for constipation. Negative for abdominal pain, blood in stool and diarrhea.   Genitourinary:  Negative for difficulty urinating and hematuria.   Neurological:  Negative for dizziness, light-headedness and headaches.         Health Maintenance Due   Topic Date Due    HIV Screening  Never done    Shingles Vaccine (1 of 2) Never done    RSV Vaccine (Age 60+ and Pregnant patients) (1 - Risk 60-74 years 1-dose series) Never done    COVID-19 Vaccine (3 - 2024-25 season) 09/01/2024     Immunization History   Administered Date(s) Administered    COVID-19, MRNA, LN-S, PF (MODERNA FULL 0.5 ML DOSE) 09/23/2021, 10/21/2021    Influenza - Quadrivalent - PF *Preferred* (6 months and older) 09/07/2017, 10/21/2021    Influenza - Trivalent - Afluria, Fluzone MDV 10/25/2010, 09/20/2011    Influenza - Trivalent - Fluad - Adjuvanted - PF (65 years and older 11/04/2024    Influenza - Trivalent - Fluarix, Flulaval, Fluzone, Afluria - PF 09/08/2012, 09/30/2015    Pneumococcal Conjugate - 20 Valent 11/04/2024    Pneumococcal Polysaccharide - 23 Valent 10/25/2010, 11/13/2021    Tdap 04/25/2024         Results for orders placed or performed in visit on 05/01/25   Comprehensive Metabolic Panel    Collection Time: 05/01/25  8:00 AM   Result Value Ref Range    Sodium 138 136 - 145 mmol/L    Potassium 4.4 3.5 - 5.1 mmol/L    Chloride 102 95 - 110 mmol/L    CO2 26 23 - 29 mmol/L    Glucose 107 70 - 110 mg/dL    BUN 17 8 - 23 mg/dL    Creatinine 0.9 0.5 - 1.4 mg/dL    Calcium 9.4 8.7 - 10.5 mg/dL    Protein Total 7.4 6.0 - 8.4 gm/dL    Albumin 3.8 3.5 - 5.2 g/dL    Bilirubin Total 0.7 0.1 - 1.0 mg/dL    ALP 54 40 - 150 unit/L    AST 16 11 - 45 unit/L    ALT 16 10 - 44 unit/L    Anion Gap 10 8 - 16 mmol/L    eGFR >60 >60 mL/min/1.73/m2   Hemoglobin A1C    Collection Time: 05/01/25  8:00 AM   Result Value Ref Range    Hemoglobin A1c 5.3 4.0 - 5.6  "%    Estimated Average Glucose 105 68 - 131 mg/dL   Lipid Panel    Collection Time: 05/01/25  8:00 AM   Result Value Ref Range    Cholesterol Total 150 120 - 199 mg/dL    Triglyceride 67 30 - 150 mg/dL    HDL Cholesterol 50 40 - 75 mg/dL    LDL Cholesterol 86.6 63.0 - 159.0 mg/dL    HDL/Cholesterol Ratio 33.3 20.0 - 50.0 %    Cholesterol/HDL Ratio 3.0 2.0 - 5.0    Non HDL Cholesterol 100 mg/dL   Vitamin B12    Collection Time: 05/01/25  8:00 AM   Result Value Ref Range    Vitamin B12 397 210 - 950 pg/mL   Vitamin D    Collection Time: 05/01/25  8:00 AM   Result Value Ref Range    Vitamin D 22 (L) 30 - 96 ng/mL   TSH    Collection Time: 05/01/25  8:00 AM   Result Value Ref Range    TSH 2.185 0.400 - 4.000 uIU/mL   PSA, Screening    Collection Time: 05/01/25  8:00 AM   Result Value Ref Range    Prostate Specific Antigen 0.99 <=4.00 ng/mL   FOLATE    Collection Time: 05/01/25  8:00 AM   Result Value Ref Range    Folate Level 10.9 4.0 - 24.0 ng/mL   CBC with Differential    Collection Time: 05/01/25  8:00 AM   Result Value Ref Range    WBC 5.28 3.90 - 12.70 K/uL    RBC 4.64 4.60 - 6.20 M/uL    HGB 13.4 (L) 14.0 - 18.0 gm/dL    HCT 43.0 40.0 - 54.0 %    MCV 93 82 - 98 fL    MCH 28.9 27.0 - 31.0 pg    MCHC 31.2 (L) 32.0 - 36.0 g/dL    RDW 13.7 11.5 - 14.5 %    Platelet Count 198 150 - 450 K/uL    MPV 10.8 9.2 - 12.9 fL    Nucleated RBC 0 <=0 /100 WBC    Neut % 59.1 38 - 73 %    Lymph % 26.3 18 - 48 %    Mono % 10.6 4 - 15 %    Eos % 2.5 <=8 %    Basophil % 1.3 <=1.9 %    Imm Grans % 0.2 0.0 - 0.5 %    Neut # 3.12 1.8 - 7.7 K/uL    Lymph # 1.39 1 - 4.8 K/uL    Mono # 0.56 0.3 - 1 K/uL    Eos # 0.13 <=0.5 K/uL    Baso # 0.07 <=0.2 K/uL    Imm Grans # 0.01 0.00 - 0.04 K/uL       Objective:     Vitals:    05/05/25 1357   BP: 118/78   BP Location: Left arm   Patient Position: Sitting   Pulse: 72   Temp: 97.2 °F (36.2 °C)   TempSrc: Temporal   SpO2: 97%   Weight: 112 kg (246 lb 14.6 oz)   Height: 6' 7" (2.007 m)        Physical " Exam  Vitals and nursing note reviewed.   Constitutional:       General: He is not in acute distress.     Appearance: He is not ill-appearing, toxic-appearing or diaphoretic.   Cardiovascular:      Rate and Rhythm: Normal rate and regular rhythm.      Comments: Appears to be RRR  Pulmonary:      Effort: Pulmonary effort is normal. No respiratory distress.      Breath sounds: Normal breath sounds.   Abdominal:      General: There is no distension.      Palpations: Abdomen is soft.      Tenderness: There is no abdominal tenderness. There is no guarding.   Musculoskeletal:      Comments: Varicose veins noted BL  Changes consistent with darkening of skin from chronic venous insufficiency noted BL  No wounds noted   Neurological:      General: No focal deficit present.      Mental Status: He is alert.   Psychiatric:         Mood and Affect: Mood normal.         Behavior: Behavior normal.         Thought Content: Thought content normal.         Judgment: Judgment normal.         Assessment:       1. Other hyperlipidemia    2. Essential hypertension    3. Varicose veins of left lower extremity with pain    4. Persistent atrial fibrillation    5. Vitamin D deficiency    6. BMI 27.0-27.9,adult    7. GERD without esophagitis    8. Normocytic anemia        Plan:       Restart coreg at 6.25 mg BID  May also benefit heart given history of a fib  When restarting this medication, decrease losartan to 25 mg  May need to stop losartan completely.     Continue b12 at 5000 mcg  Continue vitamin D weekly    Protonix daily  Crestor daily    Refer back to EP, message sent asking about amiodarone  May be able to stop, based on last note, but waiting for verification  Continue pradaxa    VI per cardiology    F/u 6 months.     Other hyperlipidemia  -     rosuvastatin (CRESTOR) 5 MG tablet; Take 1 tablet (5 mg total) by mouth once daily. For cholesterol  Dispense: 90 tablet; Refill: 3    Essential hypertension  -     losartan (COZAAR) 25 MG  tablet; Take 1 tablet (25 mg total) by mouth every evening. For blood pressure.  Dispense: 90 tablet; Refill: 1  -     carvediloL (COREG) 6.25 MG tablet; Take 1 tablet (6.25 mg total) by mouth 2 (two) times daily with meals.  Dispense: 180 tablet; Refill: 3    Varicose veins of left lower extremity with pain    Persistent atrial fibrillation  -     Ambulatory referral/consult to Cardiac Electrophysiology; Future; Expected date: 05/12/2025  -     carvediloL (COREG) 6.25 MG tablet; Take 1 tablet (6.25 mg total) by mouth 2 (two) times daily with meals.  Dispense: 180 tablet; Refill: 3    Vitamin D deficiency  -     ergocalciferol (ERGOCALCIFEROL) 50,000 unit Cap; TAKE 1 CAPSULE BY MOUTH EVERY 7 DAYS.  Dispense: 12 capsule; Refill: 3    BMI 27.0-27.9,adult    GERD without esophagitis  -     pantoprazole (PROTONIX) 40 MG tablet; Take 1 tablet (40 mg total) by mouth once daily.  Dispense: 90 tablet; Refill: 1    Normocytic anemia  -     CBC Auto Differential; Future; Expected date: 05/06/2025  -     Iron and TIBC; Future; Expected date: 05/06/2025  -     Ferritin; Future; Expected date: 05/06/2025

## 2025-05-05 NOTE — PATIENT INSTRUCTIONS
Restart coreg at 6.25 mg BID  May also benefit heart given history of a fib  When restarting this medication, decrease losartan to 25 mg  May need to stop losartan completely.     Continue b12 at 5000 mcg  Continue vitamin D weekly    Protonix daily  Crestor daily    Refer back to EP, message sent asking about amiodarone  May be able to stop, based on last note, but waiting for verification  Continue pradaxa    VI per cardiology    F/u 6 months.

## 2025-05-06 ENCOUNTER — TELEPHONE (OUTPATIENT)
Dept: FAMILY MEDICINE | Facility: CLINIC | Age: 65
End: 2025-05-06
Payer: MEDICARE

## 2025-05-06 NOTE — TELEPHONE ENCOUNTER
----- Message from Jose A Ott DO sent at 5/6/2025  8:34 AM CDT -----  Please contact patientEP says he can stop amiodaroneAlso, had mild anemia, can you set up labs in 3 months for him just so I can trend. Jose A

## 2025-05-06 NOTE — TELEPHONE ENCOUNTER
Called patient per Dr Ott. Spoke to patient. Verbalized to patient, Dr Ott spoke to EP and its okay for him to stop the amiodarone. Also that patient has mild anemia and labs need to be repeated in 3 months. Patient verbalized understanding.

## 2025-05-06 NOTE — TELEPHONE ENCOUNTER
----- Message from Sanchez Boucher MD sent at 5/6/2025  7:11 AM CDT -----  Hi Vita free to stop his amiodarone. Thanks for letting me knowPaul  ----- Message -----  From: Jose A Ott DO  Sent: 5/5/2025   2:41 PM CDT  To: Sanchez Boucher MD; Dima Johnson MD

## 2025-05-14 ENCOUNTER — HOSPITAL ENCOUNTER (OUTPATIENT)
Dept: CARDIOLOGY | Facility: HOSPITAL | Age: 65
Discharge: HOME | End: 2025-05-14
Attending: STUDENT IN AN ORGANIZED HEALTH CARE EDUCATION/TRAINING PROGRAM
Payer: MEDICARE

## 2025-05-14 DIAGNOSIS — I83.812 VARICOSE VEINS OF LEFT LOWER EXTREMITY WITH PAIN: ICD-10-CM

## 2025-05-14 PROCEDURE — 93970 EXTREMITY STUDY: CPT | Mod: TC

## 2025-05-14 PROCEDURE — 93970 EXTREMITY STUDY: CPT | Mod: 26,,, | Performed by: INTERNAL MEDICINE

## 2025-05-15 ENCOUNTER — OFFICE VISIT (OUTPATIENT)
Dept: DERMATOLOGY | Facility: CLINIC | Age: 65
End: 2025-05-15
Payer: MEDICARE

## 2025-05-15 DIAGNOSIS — L82.1 SK (SEBORRHEIC KERATOSIS): ICD-10-CM

## 2025-05-15 DIAGNOSIS — L57.0 AK (ACTINIC KERATOSIS): Primary | ICD-10-CM

## 2025-05-15 DIAGNOSIS — D22.9 NEVUS: ICD-10-CM

## 2025-05-15 DIAGNOSIS — L81.4 LENTIGO: ICD-10-CM

## 2025-05-15 PROCEDURE — 17000 DESTRUCT PREMALG LESION: CPT | Mod: PBBFAC,PO | Performed by: DERMATOLOGY

## 2025-05-15 PROCEDURE — 99999 PR PBB SHADOW E&M-EST. PATIENT-LVL III: CPT | Mod: PBBFAC,,, | Performed by: DERMATOLOGY

## 2025-05-15 PROCEDURE — 99213 OFFICE O/P EST LOW 20 MIN: CPT | Mod: PBBFAC,PO | Performed by: DERMATOLOGY

## 2025-05-15 PROCEDURE — 17003 DESTRUCT PREMALG LES 2-14: CPT | Mod: PBBFAC,PO | Performed by: DERMATOLOGY

## 2025-05-15 NOTE — PROGRESS NOTES
"  Subjective:      Patient ID:  Kevan Norris is a 64 y.o. male who presents for   Chief Complaint   Patient presents with    Skin Check     ubse     Patient is here today for a "UBSE" check.     Pt has a history of  extensive sun exposure in the past.   Pt recalls several blistering sunburns in the past- yes  Pt has history of tanning bed use- no  Pt has  had moles removed in the past- no  Pt has history of melanoma in first degree relatives-  no    Pt denies any new or changing lesions today.    Pt s/p pdt to scalp . Tolerated well.            Review of Systems   Skin:  Negative for daily sunscreen use, activity-related sunscreen use, tendency to form keloidal scars and recent sunburn.   Hematologic/Lymphatic: Does not bruise/bleed easily.       Objective:   Physical Exam   Constitutional: He appears well-developed and well-nourished. No distress.   Neurological: He is alert and oriented to person, place, and time. He is not disoriented.   Psychiatric: He has a normal mood and affect.   Skin:   Areas Examined (abnormalities noted in diagram):   Scalp / Hair Palpated and Inspected  Head / Face Inspection Performed  Neck Inspection Performed  Chest / Axilla Inspection Performed  Abdomen Inspection Performed  Back Inspection Performed  RUE Inspected  LUE Inspection Performed  Nails and Digits Inspection Performed                     Diagram Legend     Erythematous scaling macule/papule c/w actinic keratosis       Vascular papule c/w angioma      Pigmented verrucoid papule/plaque c/w seborrheic keratosis      Yellow umbilicated papule c/w sebaceous hyperplasia      Irregularly shaped tan macule c/w lentigo     1-2 mm smooth white papules consistent with Milia      Movable subcutaneous cyst with punctum c/w epidermal inclusion cyst      Subcutaneous movable cyst c/w pilar cyst      Firm pink to brown papule c/w dermatofibroma      Pedunculated fleshy papule(s) c/w skin tag(s)      Evenly pigmented macule c/w " junctional nevus     Mildly variegated pigmented, slightly irregular-bordered macule c/w mildly atypical nevus      Flesh colored to evenly pigmented papule c/w intradermal nevus       Pink pearly papule/plaque c/w basal cell carcinoma      Erythematous hyperkeratotic cursted plaque c/w SCC      Surgical scar with no sign of skin cancer recurrence      Open and closed comedones      Inflammatory papules and pustules      Verrucoid papule consistent consistent with wart     Erythematous eczematous patches and plaques     Dystrophic onycholytic nail with subungual debris c/w onychomycosis     Umbilicated papule    Erythematous-base heme-crusted tan verrucoid plaque consistent with inflamed seborrheic keratosis     Erythematous Silvery Scaling Plaque c/w Psoriasis     See annotation      Assessment / Plan:        AK (actinic keratosis)  Cryosurgery Procedure Note    Verbal consent from the patient is obtained including, but not limited to, risk of hypopigmentation/hyperpigmentation, scar, recurrence of lesion. The patient is aware of the precancerous quality and need for treatment of these lesions. Liquid nitrogen cryosurgery is applied to the 7 actinic keratoses, as detailed in the physical exam, to produce a freeze injury. The patient is aware that blisters may form and is instructed on wound care with gentle cleansing and use of vaseline ointment to keep moist until healed. The patient is supplied a handout on cryosurgery and is instructed to call if lesions do not completely resolve.    Lentigo  This is a benign hyperpigmented sun induced lesion. Recommend daily sun protection/avoidance and use of at least SPF 30, broad spectrum sunscreen (OTC drug) will reduce the number of new lesions. Treatment of these benign lesions are considered cosmetic.  The nature of sun-induced photo-aging and skin cancers is discussed.  Sun avoidance, protective clothing, and the use of 30-SPF sunscreens is advised. Observe closely for  skin damage/changes, and call if such occurs.    SK (seborrheic keratosis)  These are benign inherited growths without a malignant potential. Reassurance given to patient. No treatment is necessary.     Nevus  Discussed ABCDE's of nevi.  Monitor for new mole or moles that are becoming bigger, darker, irritated, or developing irregular borders. Brochure provided. Instructed patient to observe lesion(s) for changes and follow up in clinic if changes are noted. Patient to monitor skin at home for new or changing lesions.       Upper body skin examination performed today including at least 6 points as noted in physical examination. No lesions suspicious for malignancy noted.    Recommend daily sun protection/avoidance and use of at least SPF 30, broad spectrum sunscreen (OTC drug).              Follow up in about 6 months (around 11/15/2025) for UBSE.

## 2025-05-16 LAB
LEFT GREAT SAPHENOUS DISTAL THIGH DIA: 0.36 CM
LEFT GREAT SAPHENOUS JUNCTION DIA: 0.33 CM
LEFT GREAT SAPHENOUS MIDDLE THIGH DIA: 0.36 CM
LEFT GREAT SAPHENOUS PROXIMAL CALF DIA: 0.26 CM
LEFT GREAT SAPHENOUS PROXIMAL CALF REFLUX: 1513 MS
RIGHT GREAT SAPHENOUS DISTAL THIGH DIA: 0.3 CM
RIGHT GREAT SAPHENOUS JUNCTION DIA: 0.48 CM
RIGHT GREAT SAPHENOUS JUNCTION REFLUX: 885 MS
RIGHT GREAT SAPHENOUS MIDDLE THIGH DIA: 0.4 CM
RIGHT GREAT SAPHENOUS MIDDLE THIGH REFLUX: 531 MS
RIGHT GREAT SAPHENOUS PROXIMAL CALF DIA: 0.46 CM
RIGHT GREAT SAPHENOUS PROXIMAL CALF REFLUX: 1730 MS
RIGHT SMALL SAPHENOUS KNEE DIA: 0.28 CM
RIGHT SMALL SAPHENOUS KNEE REFLUX: 794 MS

## 2025-05-26 ENCOUNTER — TELEPHONE (OUTPATIENT)
Dept: CARDIOLOGY | Facility: CLINIC | Age: 65
End: 2025-05-26
Payer: MEDICARE

## 2025-05-26 NOTE — TELEPHONE ENCOUNTER
----- Message from Dima Johnson MD sent at 5/23/2025  9:21 PM CDT -----  Let him know that he has significant venous insufficiency. Will need a sooner appointment not 6/17/25.   ----- Message -----  From: Parish Benton MD  Sent: 5/16/2025   3:42 PM CDT  To: Dima Johnson MD

## 2025-05-30 ENCOUNTER — TELEPHONE (OUTPATIENT)
Dept: CARDIOLOGY | Facility: CLINIC | Age: 65
End: 2025-05-30
Payer: MEDICARE

## 2025-05-30 ENCOUNTER — OFFICE VISIT (OUTPATIENT)
Dept: CARDIOLOGY | Facility: CLINIC | Age: 65
End: 2025-05-30
Payer: MEDICARE

## 2025-05-30 VITALS
WEIGHT: 242.5 LBS | SYSTOLIC BLOOD PRESSURE: 163 MMHG | DIASTOLIC BLOOD PRESSURE: 128 MMHG | HEART RATE: 58 BPM | OXYGEN SATURATION: 96 % | HEIGHT: 78 IN | BODY MASS INDEX: 28.06 KG/M2

## 2025-05-30 DIAGNOSIS — I10 ESSENTIAL HYPERTENSION: ICD-10-CM

## 2025-05-30 DIAGNOSIS — I83.812 VARICOSE VEINS OF LEFT LOWER EXTREMITY WITH PAIN: Primary | ICD-10-CM

## 2025-05-30 DIAGNOSIS — Z86.718 HISTORY OF DVT (DEEP VEIN THROMBOSIS): ICD-10-CM

## 2025-05-30 DIAGNOSIS — R06.09 DOE (DYSPNEA ON EXERTION): ICD-10-CM

## 2025-05-30 DIAGNOSIS — I70.0 AORTIC ATHEROSCLEROSIS: ICD-10-CM

## 2025-05-30 DIAGNOSIS — I87.2 VENOUS (PERIPHERAL) INSUFFICIENCY: ICD-10-CM

## 2025-05-30 PROCEDURE — 99215 OFFICE O/P EST HI 40 MIN: CPT | Mod: S$PBB,,, | Performed by: STUDENT IN AN ORGANIZED HEALTH CARE EDUCATION/TRAINING PROGRAM

## 2025-05-30 PROCEDURE — 99214 OFFICE O/P EST MOD 30 MIN: CPT | Mod: PBBFAC,PN | Performed by: STUDENT IN AN ORGANIZED HEALTH CARE EDUCATION/TRAINING PROGRAM

## 2025-05-30 PROCEDURE — 99999 PR PBB SHADOW E&M-EST. PATIENT-LVL IV: CPT | Mod: PBBFAC,,, | Performed by: STUDENT IN AN ORGANIZED HEALTH CARE EDUCATION/TRAINING PROGRAM

## 2025-05-30 NOTE — TELEPHONE ENCOUNTER
Reached out to and offered an appt with Dr Benton     Unable to complete a virtual visit    Will arrive 6/5 for appt at 0740 to discuss     ----- Message from Dima Johnson MD sent at 5/30/2025  8:58 AM CDT -----  Vein ablation referral for Dr. Benton. Thank you

## 2025-06-02 NOTE — H&P (VIEW-ONLY)
Subjective:   @Patient ID:  Kevan Norris is a 64 y.o. male who presents for evaluation of venous insufficiency      HPI:   06/02/2025:  Initial evaluation for venous insufficiency.  Referred by Dr. Martin.  He has history of venous insufficiency post left side ablation more than 10 years ago multiple times with a recurrent of his varicosities.  Significant B/L edema  LT>> RT. significant varicosities with occasional bleeding.  He is very compliant with the compression stockings daily.  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    CEAP 4 S              SH:  Stopped smoking 2008, no alcohol abuse    FH:  Mother had significant venous insufficiency    PMH:  AFib, DVT, type 2 DM, remote tobacco abuse, venous insufficiency  Past Medical History:   Diagnosis Date    atrial fibrillation     Blood clot in vein     Right Leg, 2009    Diabetes mellitus     Diabetes mellitus, type 2     Encounter for blood transfusion     Gout flare     Hyperlipemia     Hypertension     Malignant melanoma of skin, unspecified     left arm    Prostate atrophy     Varicose veins           Prior cardiovascular  Hx  --------------------------------  Venous U/S May 2025    There is no evidence of a right lower extremity DVT.    There is no evidence of a left lower extremity DVT.    The right popliteal vein is has reflux.    The left superficial femoral middle vein has reflux.    The left greater saphenous vein has reflux.  Large varicosities around the knee with significant reflux    The right greater saphenous vein has reflux.  Significant large varicosities behind the knee    Bilateral superficial and deep reflux       Echo March 2025:    Left Ventricle: The left ventricle is normal in size. Normal wall thickness. There is normal systolic function. Quantitated ejection fraction is 65%. There is normal diastolic function.    Right Ventricle: Systolic function is normal.    Left Atrium: Normal left atrial size. Secundum type  atrial septal defect indicated by color flow Doppler.  Left-to-right shunt    Tricuspid Valve: There is mild regurgitation.    Pulmonary Artery: The estimated pulmonary artery systolic pressure is 34 mmHg.    IVC/SVC: Normal venous pressure at 3 mmHg.          The 10-year ASCVD risk score (Ras WELCH, et al., 2019) is: 14.4%    Values used to calculate the score:      Age: 64 years      Sex: Male      Is Non- : No      Diabetic: No      Tobacco smoker: No      Systolic Blood Pressure: 142 mmHg      Is BP treated: Yes      HDL Cholesterol: 50 mg/dL      Total Cholesterol: 150 mg/dL      Patient Active Problem List    Diagnosis Date Noted    Long term (current) use of anticoagulants 08/14/2024    New onset of congestive heart failure, in setting of atrial fibrillation 07/16/2024    Aortic atherosclerosis 04/26/2024    Persistent atrial fibrillation 04/25/2024    Diverticulosis in the sigmoid colon, in the desending colon, and the transverse colon 12/15/2022    Venous insufficiency of lower extremity 06/15/2022    Asymptomatic microscopic hematuria 12/05/2021    Vitamin D deficiency 12/05/2021    Anemia due to vitamin B12 deficiency 12/05/2021    Other hyperlipidemia 12/02/2021    Chronic gout without tophus 12/02/2021    GERD without esophagitis 12/02/2021    Essential hypertension 12/02/2021    BMI 27.0-27.9,adult 12/02/2021    History of DVT (deep vein thrombosis) 12/02/2021    Varicose veins of left lower extremity with pain 12/02/2021    Umbilical hernia without obstruction and without gangrene 12/02/2021                    LAST HbA1c  Lab Results   Component Value Date    HGBA1C 5.3 05/01/2025       Lipid panel  Lab Results   Component Value Date    CHOL 150 05/01/2025    CHOL 161 04/23/2024    CHOL 141 12/13/2022     Lab Results   Component Value Date    HDL 50 05/01/2025    HDL 52 04/23/2024    HDL 44 12/13/2022     Lab Results   Component Value Date    LDLCALC 86.6 05/01/2025    LDLCALC  91.0 04/23/2024    LDLCALC 86.2 12/13/2022     Lab Results   Component Value Date    TRIG 67 05/01/2025    TRIG 90 04/23/2024    TRIG 54 12/13/2022     Lab Results   Component Value Date    CHOLHDL 33.3 05/01/2025    CHOLHDL 32.3 04/23/2024    CHOLHDL 31.2 12/13/2022            Review of Systems   Constitutional: Negative for chills and fever.   HENT:  Negative for hearing loss and nosebleeds.    Eyes:  Negative for blurred vision.   Cardiovascular:         As in HPI    Respiratory:  Negative for cough, hemoptysis and shortness of breath.    Endocrine: Negative for cold intolerance and polyuria.   Hematologic/Lymphatic: Negative for bleeding problem.   Skin:  Negative for itching.   Musculoskeletal:  Negative for falls.   Gastrointestinal:  Negative for abdominal pain and hematochezia.   Genitourinary:  Negative for hematuria.   Neurological:  Negative for dizziness and loss of balance.   Psychiatric/Behavioral:  Negative for altered mental status and depression.        Objective:   Physical Exam  Constitutional:       Appearance: He is well-developed.   HENT:      Head: Normocephalic and atraumatic.   Eyes:      Conjunctiva/sclera: Conjunctivae normal.   Neck:      Vascular: No carotid bruit or JVD.   Cardiovascular:      Rate and Rhythm: Normal rate. Rhythm irregular.      Pulses:           Carotid pulses are 2+ on the right side and 2+ on the left side.       Radial pulses are 2+ on the right side and 2+ on the left side.      Heart sounds: Normal heart sounds. No murmur heard.     No friction rub. No gallop.   Pulmonary:      Effort: Pulmonary effort is normal. No respiratory distress.      Breath sounds: Normal breath sounds. No stridor. No wheezing.   Abdominal:      General: Abdomen is flat.      Palpations: Abdomen is soft.   Musculoskeletal:      Cervical back: Neck supple.      Right lower leg: No edema.      Left lower leg: No edema.   Skin:     Findings: Rash present.      Comments: Significant  varicosities  Significant venous stasis changes   Neurological:      Mental Status: He is alert and oriented to person, place, and time.   Psychiatric:         Behavior: Behavior normal.         Assessment:     1. Venous insufficiency of lower extremity    2. Persistent atrial fibrillation        Plan:       We had a long discussion regarding the pathophysiology of venous insufficiency.  We discussed both superficial and deep venous reflux disease.  Initial treatment is usually conservative with compression stockings, exercise, weight loss, and calf muscle compressive therapy.  If conservative measures fail then we will proceed with superficial venous reflux disease management with ablative therapy.  If symptoms persist then we will proceed with evaluation of deep venous reflux disease.  This requires a venogram with intravascular ultrasound for guidance of intervention if clinically indicated.  The patient expressed verbal understanding of the plan.  Weight loss.  Low-salt diet.  Exercise.   He has significant symptoms from his venous disease.  Significant reflux with very large varicosities mostly behind the knee.  Left GSV with significant reflux.   We will proceed with left GSV ablation and associated varicosities with Varithena.  May have to place the patient in supine/prone position for treatment.   CEAP 4s    He has been very compliant with the compression stockings  Pertinent cardiac images and EKG reviewed independently.    Continue with current medical plan and lifestyle changes.  Return sooner for concerns or questions. If symptoms persist go to the ED  I have reviewed all pertinent data including patient's medical history in detail and updated the computerized patient record.       2. Afib  Managed by Dr. Martin      Three-month follow-up postprocedure  Follow up with Dr. Martin for regular cardiac care  No orders of the defined types were placed in this encounter.      Follow up as scheduled.  "      -In today's visit, monitoring for drug toxicity was accomplished.     Greater than 50% of the visit was spent counseling, educating, and coordinating the care of the patient.     He expressed verbal understanding and agreed with the plan    Patient's Medications   New Prescriptions    No medications on file   Previous Medications    CARVEDILOL (COREG) 6.25 MG TABLET    Take 1 tablet (6.25 mg total) by mouth 2 (two) times daily with meals.    CYANOCOBALAMIN, VITAMIN B-12, 2,500 MCG TAB    Take 5,000 mcg by mouth once daily.    DABIGATRAN ETEXILATE (PRADAXA) 150 MG CAP    Take 1 capsule (150 mg total) by mouth 2 (two) times a day. "Do NOT break, chew, or open capsules."    ERGOCALCIFEROL (ERGOCALCIFEROL) 50,000 UNIT CAP    TAKE 1 CAPSULE BY MOUTH EVERY 7 DAYS.    KETOCONAZOLE (NIZORAL) 2 % CREAM    AAA bid x 3 weeks    LOSARTAN (COZAAR) 25 MG TABLET    Take 1 tablet (25 mg total) by mouth every evening. For blood pressure.    PANTOPRAZOLE (PROTONIX) 40 MG TABLET    Take 1 tablet (40 mg total) by mouth once daily.    ROSUVASTATIN (CRESTOR) 5 MG TABLET    Take 1 tablet (5 mg total) by mouth once daily. For cholesterol   Modified Medications    No medications on file   Discontinued Medications    No medications on file        Parish Benton MD, FACC, VI  [facility] V[facility] Interventional Cardiology           "

## 2025-06-05 ENCOUNTER — TELEPHONE (OUTPATIENT)
Dept: CARDIOLOGY | Facility: CLINIC | Age: 65
End: 2025-06-05

## 2025-06-05 ENCOUNTER — OFFICE VISIT (OUTPATIENT)
Dept: CARDIOLOGY | Facility: CLINIC | Age: 65
End: 2025-06-05
Payer: MEDICARE

## 2025-06-05 VITALS
DIASTOLIC BLOOD PRESSURE: 104 MMHG | SYSTOLIC BLOOD PRESSURE: 142 MMHG | HEIGHT: 78 IN | BODY MASS INDEX: 27.83 KG/M2 | HEART RATE: 61 BPM | WEIGHT: 240.5 LBS | OXYGEN SATURATION: 95 %

## 2025-06-05 DIAGNOSIS — I48.19 PERSISTENT ATRIAL FIBRILLATION: ICD-10-CM

## 2025-06-05 DIAGNOSIS — I87.2 VENOUS INSUFFICIENCY OF LOWER EXTREMITY: Primary | ICD-10-CM

## 2025-06-05 PROCEDURE — 99999 PR PBB SHADOW E&M-EST. PATIENT-LVL III: CPT | Mod: PBBFAC,,, | Performed by: INTERNAL MEDICINE

## 2025-06-05 PROCEDURE — 99213 OFFICE O/P EST LOW 20 MIN: CPT | Mod: PBBFAC,PN | Performed by: INTERNAL MEDICINE

## 2025-06-06 ENCOUNTER — PATIENT MESSAGE (OUTPATIENT)
Dept: ADMINISTRATIVE | Facility: HOSPITAL | Age: 65
End: 2025-06-06
Payer: MEDICARE

## 2025-06-13 ENCOUNTER — TELEPHONE (OUTPATIENT)
Dept: ADMINISTRATIVE | Facility: CLINIC | Age: 65
End: 2025-06-13
Payer: MEDICARE

## 2025-06-13 ENCOUNTER — HOSPITAL ENCOUNTER (OUTPATIENT)
Facility: HOSPITAL | Age: 65
Discharge: HOME | End: 2025-06-13
Attending: INTERNAL MEDICINE | Admitting: INTERNAL MEDICINE
Payer: MEDICARE

## 2025-06-13 VITALS
TEMPERATURE: 98 F | HEART RATE: 60 BPM | OXYGEN SATURATION: 97 % | WEIGHT: 240 LBS | HEIGHT: 78 IN | BODY MASS INDEX: 27.77 KG/M2 | DIASTOLIC BLOOD PRESSURE: 99 MMHG | RESPIRATION RATE: 18 BRPM | SYSTOLIC BLOOD PRESSURE: 145 MMHG

## 2025-06-13 DIAGNOSIS — I87.2 VENOUS INSUFFICIENCY OF LOWER EXTREMITY: ICD-10-CM

## 2025-06-13 DIAGNOSIS — E78.49 OTHER HYPERLIPIDEMIA: Primary | ICD-10-CM

## 2025-06-13 PROCEDURE — 63600175 PHARM REV CODE 636 W HCPCS: Performed by: INTERNAL MEDICINE

## 2025-06-13 PROCEDURE — C9399 UNCLASSIFIED DRUGS OR BIOLOG: HCPCS | Performed by: INTERNAL MEDICINE

## 2025-06-13 PROCEDURE — C1769 GUIDE WIRE: HCPCS | Performed by: INTERNAL MEDICINE

## 2025-06-13 PROCEDURE — C1894 INTRO/SHEATH, NON-LASER: HCPCS | Performed by: INTERNAL MEDICINE

## 2025-06-13 RX ORDER — LIDOCAINE HYDROCHLORIDE 10 MG/ML
INJECTION, SOLUTION EPIDURAL; INFILTRATION; INTRACAUDAL; PERINEURAL
Status: DISCONTINUED | OUTPATIENT
Start: 2025-06-13 | End: 2025-06-13 | Stop reason: HOSPADM

## 2025-06-13 RX ORDER — HEPARIN SODIUM 200 [USP'U]/100ML
INJECTION, SOLUTION INTRAVENOUS
Status: DISCONTINUED | OUTPATIENT
Start: 2025-06-13 | End: 2025-06-13 | Stop reason: HOSPADM

## 2025-06-13 NOTE — DISCHARGE SUMMARY
[facility] - Cath Lab (Hospital)  Discharge Note  Short Stay    Procedure(s) (LRB):  InJection, Foam Sclerosant, Varicose Vein (Left)      OUTCOME: Patient tolerated treatment/procedure well without complication and is now ready for discharge.    DISPOSITION: Home or Self Care    FINAL DIAGNOSIS:  <principal problem not specified>    FOLLOWUP: In clinic    DISCHARGE INSTRUCTIONS:  No discharge procedures on file.     TIME SPENT ON DISCHARGE: 25 minutes

## 2025-06-13 NOTE — Clinical Note
The site was marked. The left foot was prepped. The site was prepped with ChloraPrep. The patient was draped. Left leg

## 2025-06-13 NOTE — BRIEF OP NOTE
Successful left GSV ablation ( below the knee with Varithena), attempted closing the large varicosities behind the knee with direct injection. Very large varicosities that is directly draining into Popliteal vein hence was limited and unable to use a lot of medicine to close the varicosities        Patient entered procedure room then procedure time out was performed to confirm correct patient, correct extremity, correct procedure and room set including supplies , devices, and drugs needed.The scope of treatment for the left GSV, accessory and the associated varicosities was determined through ultrasound imaging; all necessary veins were marked including perforators.  The patient was evaluated in reverse trendelenburg position. The leg was prepped , and sterile drape applied.  1% lidocaine was administered at the chosen puncture site to numb the access site. The vein was accessed using a micro puncture needle followed by a guidewire through the punctured needle and dilator under ultrasound guidance. Vein access was established and confirmed by aspiration of dark low pressure blood. After gaining access, the leg was positioned at 45 degrees of elevation in relation to the torso.       The Varithena canister was primed and purged as required by the instructions. A 15 ml aliquot was drawn into a sterile syringe then attached to the access device.     DISTAL GSV: Varithena was administered at 1 cc per second with close observation under ultrasound and its course of the GSV. The vein was observed for spasm under ultrasound, once vein was in full spasm the administration of Varithenia was stopped.     After the administration of Varithena the patient was asked to dorsiflex the ankle to limit flow of foam into perforating veins. Once appropriate spasm had been confirmed int he treated veins, the access device was removed from the leg and light pressure was applied to the puncture site for hemostasis.     The common femoral  and deep superficial veins were then evaluated for flow and compressibility prior to dressing placement. The lower extremity was kept elevated at a 45 degree angle and a multi layer dressing was applied including an under layer, compression pad, and thigh high 20-30 mmHg compression stocking to the patient. The leg was lowered only after compression had been applied.     The patient ambulated 10 minutes under supervision and was without concern at the time of release. Patient acre instructions include walking, wearing compression 24 hours per day, taking off only to shower, and then reapplying for 2 weeks. The patient was instructed to take antiinflammatory medications as needed and to follow up for duplex scan of treated veins.     The total length of time to complete Percutaneous Endovenous Ablation with Varithenia was 60 minutes.

## 2025-06-13 NOTE — TELEPHONE ENCOUNTER
Rosuvastatin 5 mg hs increased to 10 mg hs for target LDL < 70 mg/dL. Pended lipid panel per protocol to be drawn in ~ 2 months.    ----- Message from RHIANNON Gann sent at 2025 12:33 PM CDT -----  Regarding: Order for TIARRA MCKEON    Patient Name: TIARRA MCKEON([id number])  Sex: Male  : 1960      PCP: NIESHA MORTENSEN    Center: [facility]     Types of orders made on 2025: Nursing, Transfer    Order Date:2025  Ordering User:GERMAINE BENTON [[id number]  Attending Provider:Germaine Benton MD [[id number]]  Authorizing Provider: Germaine Benton MD [[id number]]  Department:[facility] CATH LAB/EP[[id number]]    Order Specific Information  Order: Notify [facility] [Custom: [id number]]  Order #: [id number]: 1    Priority: Routine  Class: Hospital Performed    Associated Diagnoses      I87.2 Venous insufficiency of lower extremity    Released on: 2025 12:33 PM        Priority: Routine  Class: Hospital Performed    Associated Diagnoses      I87.2 Venous insufficiency of lower extremity    Released on: 2025 12:33 PM

## 2025-06-13 NOTE — PLAN OF CARE
Pt here for ablation left leg and dangling at bedside and in no distress and has no pain. lower legs w/ scaly dry skin and dark discoloration, more edema in left leg than right; pt is ambulatory.

## 2025-06-13 NOTE — NURSING
Pt discharged per MD order. Vitals stable. Pt ambulated in unit with no problems and dressed self. L lower leg sites WNL, no swelling, bleeding, oozing, tenderness, or hematoma present. All discharge instructions given and pt verbalizes full understanding to all instructions and all questions answered. Pt ambulated to vehicle.

## 2025-06-13 NOTE — Clinical Note
ID band present and verified. as pt is npo    while in house will use prn iv bp meds    and started a  low dose catapres patch

## 2025-06-18 RX ORDER — ROSUVASTATIN CALCIUM 10 MG/1
10 TABLET, COATED ORAL DAILY
Qty: 90 TABLET | Refills: 3 | Status: SHIPPED | OUTPATIENT
Start: 2025-06-18 | End: 2026-06-18

## 2025-07-15 ENCOUNTER — HOSPITAL ENCOUNTER (OUTPATIENT)
Dept: RADIOLOGY | Facility: HOSPITAL | Age: 65
Discharge: HOME | End: 2025-07-15
Attending: STUDENT IN AN ORGANIZED HEALTH CARE EDUCATION/TRAINING PROGRAM
Payer: MEDICARE

## 2025-07-15 ENCOUNTER — HOSPITAL ENCOUNTER (OUTPATIENT)
Dept: CARDIOLOGY | Facility: HOSPITAL | Age: 65
Discharge: HOME | End: 2025-07-15
Attending: STUDENT IN AN ORGANIZED HEALTH CARE EDUCATION/TRAINING PROGRAM
Payer: MEDICARE

## 2025-07-15 DIAGNOSIS — R06.09 DOE (DYSPNEA ON EXERTION): ICD-10-CM

## 2025-07-15 LAB
CV PHARM DOSE: 0.4 MG
CV STRESS BASE HR: 56 BPM
DIASTOLIC BLOOD PRESSURE: 95 MMHG
NUC REST DIASTOLIC VOLUME INDEX: 120
NUC REST EJECTION FRACTION: 53
NUC REST SYSTOLIC VOLUME INDEX: 57
NUC STRESS DIASTOLIC VOLUME INDEX: 155
NUC STRESS EJECTION FRACTION: 49 %
NUC STRESS SYSTOLIC VOLUME INDEX: 79
OHS CV CPX 85 PERCENT MAX PREDICTED HEART RATE MALE: 132
OHS CV CPX ESTIMATED METS: 1
OHS CV CPX MAX PREDICTED HEART RATE: 155
OHS CV CPX PATIENT IS FEMALE: 0
OHS CV CPX PATIENT IS MALE: 1
OHS CV CPX PEAK DIASTOLIC BLOOD PRESSURE: 92 MMHG
OHS CV CPX PEAK HEAR RATE: 70 BPM
OHS CV CPX PEAK RATE PRESSURE PRODUCT: NORMAL
OHS CV CPX PEAK SYSTOLIC BLOOD PRESSURE: 148 MMHG
OHS CV CPX PERCENT MAX PREDICTED HEART RATE ACHIEVED: 45
OHS CV CPX RATE PRESSURE PRODUCT PRESENTING: 7504
OHS CV INITIAL DOSE: 10 MCG/KG/MIN
OHS CV PEAK DOSE: 30 MCG/KG/MIN
STRESS ECHO POST EXERCISE DUR MIN: 5 MINUTES
STRESS ECHO POST EXERCISE DUR SEC: 4 SECONDS
SYSTOLIC BLOOD PRESSURE: 134 MMHG

## 2025-07-15 PROCEDURE — 63600175 PHARM REV CODE 636 W HCPCS: Performed by: STUDENT IN AN ORGANIZED HEALTH CARE EDUCATION/TRAINING PROGRAM

## 2025-07-15 PROCEDURE — 78452 HT MUSCLE IMAGE SPECT MULT: CPT | Mod: 26,,, | Performed by: INTERNAL MEDICINE

## 2025-07-15 PROCEDURE — 78452 HT MUSCLE IMAGE SPECT MULT: CPT

## 2025-07-15 PROCEDURE — 93017 CV STRESS TEST TRACING ONLY: CPT

## 2025-07-15 PROCEDURE — 93018 CV STRESS TEST I&R ONLY: CPT | Mod: ,,, | Performed by: INTERNAL MEDICINE

## 2025-07-15 PROCEDURE — 93016 CV STRESS TEST SUPVJ ONLY: CPT | Mod: ,,, | Performed by: INTERNAL MEDICINE

## 2025-07-15 PROCEDURE — A9502 TC99M TETROFOSMIN: HCPCS | Performed by: STUDENT IN AN ORGANIZED HEALTH CARE EDUCATION/TRAINING PROGRAM

## 2025-07-15 RX ORDER — REGADENOSON 0.08 MG/ML
0.4 INJECTION, SOLUTION INTRAVENOUS
Status: COMPLETED | OUTPATIENT
Start: 2025-07-15 | End: 2025-07-15

## 2025-07-15 RX ADMIN — TETROFOSMIN 10 MILLICURIE: 1.38 INJECTION, POWDER, LYOPHILIZED, FOR SOLUTION INTRAVENOUS at 08:07

## 2025-07-15 RX ADMIN — REGADENOSON 0.4 MG: 0.08 INJECTION, SOLUTION INTRAVENOUS at 09:07

## 2025-07-15 RX ADMIN — TETROFOSMIN 30 MILLICURIE: 1.38 INJECTION, POWDER, LYOPHILIZED, FOR SOLUTION INTRAVENOUS at 09:07

## 2025-07-25 ENCOUNTER — TELEPHONE (OUTPATIENT)
Dept: CARDIOLOGY | Facility: CLINIC | Age: 65
End: 2025-07-25
Payer: MEDICARE

## 2025-07-25 NOTE — TELEPHONE ENCOUNTER
Copied from CRM #[id number]. Topic: General Inquiry - Return Call  >> Jul 25, 2025 11:06 AM Claire wrote:  Type:  Patient Returning Call    Who Called:pt   Who Left Message for Patient:Tonya   Does the patient know what this is regarding?:yes   Would the patient rather a call back or a response via [facility]? Call   Best Call Back Number:  Additional Information:

## 2025-08-06 ENCOUNTER — LAB VISIT (OUTPATIENT)
Dept: LAB | Facility: HOSPITAL | Age: 65
End: 2025-08-06
Attending: FAMILY MEDICINE
Payer: MEDICARE

## 2025-08-06 DIAGNOSIS — D64.9 NORMOCYTIC ANEMIA: ICD-10-CM

## 2025-08-06 LAB
ABSOLUTE EOSINOPHIL (OHS): 0.14 K/UL
ABSOLUTE MONOCYTE (OHS): 0.56 K/UL (ref 0.3–1)
ABSOLUTE NEUTROPHIL COUNT (OHS): 3.26 K/UL (ref 1.8–7.7)
BASOPHILS # BLD AUTO: 0.05 K/UL
BASOPHILS NFR BLD AUTO: 0.9 %
ERYTHROCYTE [DISTWIDTH] IN BLOOD BY AUTOMATED COUNT: 13.2 % (ref 11.5–14.5)
FERRITIN SERPL-MCNC: 111 NG/ML (ref 20–300)
HCT VFR BLD AUTO: 41 % (ref 40–54)
HGB BLD-MCNC: 13 GM/DL (ref 14–18)
IMM GRANULOCYTES # BLD AUTO: 0.01 K/UL (ref 0–0.04)
IMM GRANULOCYTES NFR BLD AUTO: 0.2 % (ref 0–0.5)
IRON SATN MFR SERPL: 29 % (ref 20–50)
IRON SERPL-MCNC: 101 UG/DL (ref 45–160)
LYMPHOCYTES # BLD AUTO: 1.4 K/UL (ref 1–4.8)
MCH RBC QN AUTO: 29.7 PG (ref 27–31)
MCHC RBC AUTO-ENTMCNC: 31.7 G/DL (ref 32–36)
MCV RBC AUTO: 94 FL (ref 82–98)
NUCLEATED RBC (/100WBC) (OHS): 0 /100 WBC
PLATELET # BLD AUTO: 215 K/UL (ref 150–450)
PMV BLD AUTO: 10.9 FL (ref 9.2–12.9)
RBC # BLD AUTO: 4.37 M/UL (ref 4.6–6.2)
RELATIVE EOSINOPHIL (OHS): 2.6 %
RELATIVE LYMPHOCYTE (OHS): 25.8 % (ref 18–48)
RELATIVE MONOCYTE (OHS): 10.3 % (ref 4–15)
RELATIVE NEUTROPHIL (OHS): 60.2 % (ref 38–73)
TIBC SERPL-MCNC: 348 UG/DL (ref 250–450)
TRANSFERRIN SERPL-MCNC: 235 MG/DL (ref 200–375)
WBC # BLD AUTO: 5.42 K/UL (ref 3.9–12.7)

## 2025-08-06 PROCEDURE — 36415 COLL VENOUS BLD VENIPUNCTURE: CPT | Mod: PO

## 2025-08-06 PROCEDURE — 85025 COMPLETE CBC W/AUTO DIFF WBC: CPT

## 2025-08-06 PROCEDURE — 83540 ASSAY OF IRON: CPT

## 2025-08-06 PROCEDURE — 82728 ASSAY OF FERRITIN: CPT

## 2025-08-07 ENCOUNTER — HOSPITAL ENCOUNTER (OUTPATIENT)
Dept: CARDIOLOGY | Facility: HOSPITAL | Age: 65
Discharge: HOME | End: 2025-08-07
Attending: INTERNAL MEDICINE
Payer: MEDICARE

## 2025-08-07 DIAGNOSIS — I87.2 VENOUS (PERIPHERAL) INSUFFICIENCY: ICD-10-CM

## 2025-08-07 PROCEDURE — 93971 EXTREMITY STUDY: CPT | Mod: TC,LT

## 2025-08-07 PROCEDURE — 93971 EXTREMITY STUDY: CPT | Mod: 26,LT,, | Performed by: INTERNAL MEDICINE

## 2025-08-08 LAB
LEFT GREAT SAPHENOUS DISTAL THIGH DIA: 0.33 CM
LEFT GREAT SAPHENOUS JUNCTION DIA: 0.36 CM
LEFT GREAT SAPHENOUS MIDDLE THIGH DIA: 0.37 CM
LEFT GREAT SAPHENOUS MIDDLE THIGH REFLUX: 570 MS
LEFT GREAT SAPHENOUS PROXIMAL CALF DIA: 0.32 CM
LEFT GREAT SAPHENOUS PROXIMAL CALF REFLUX: 5290 MS

## 2025-08-11 ENCOUNTER — TELEPHONE (OUTPATIENT)
Dept: FAMILY MEDICINE | Facility: CLINIC | Age: 65
End: 2025-08-11
Payer: MEDICARE

## 2025-08-13 ENCOUNTER — OFFICE VISIT (OUTPATIENT)
Dept: ELECTROPHYSIOLOGY | Facility: CLINIC | Age: 65
End: 2025-08-13
Payer: MEDICARE

## 2025-08-13 VITALS
HEART RATE: 65 BPM | WEIGHT: 239.44 LBS | HEIGHT: 78 IN | SYSTOLIC BLOOD PRESSURE: 158 MMHG | BODY MASS INDEX: 27.7 KG/M2 | DIASTOLIC BLOOD PRESSURE: 90 MMHG

## 2025-08-13 DIAGNOSIS — I48.91 NEW ONSET ATRIAL FIBRILLATION: ICD-10-CM

## 2025-08-13 DIAGNOSIS — I70.0 AORTIC ATHEROSCLEROSIS: ICD-10-CM

## 2025-08-13 DIAGNOSIS — I50.9 NEW ONSET OF CONGESTIVE HEART FAILURE: ICD-10-CM

## 2025-08-13 DIAGNOSIS — I48.19 PERSISTENT ATRIAL FIBRILLATION: Primary | ICD-10-CM

## 2025-08-13 DIAGNOSIS — I10 ESSENTIAL HYPERTENSION: ICD-10-CM

## 2025-08-13 LAB
OHS QRS DURATION: 102 MS
OHS QTC CALCULATION: 443 MS

## 2025-08-13 PROCEDURE — 99213 OFFICE O/P EST LOW 20 MIN: CPT | Mod: PBBFAC | Performed by: INTERNAL MEDICINE

## 2025-08-13 PROCEDURE — 99214 OFFICE O/P EST MOD 30 MIN: CPT | Mod: S$PBB,,, | Performed by: INTERNAL MEDICINE

## 2025-08-13 PROCEDURE — 99999 PR PBB SHADOW E&M-EST. PATIENT-LVL III: CPT | Mod: PBBFAC,,, | Performed by: INTERNAL MEDICINE

## 2025-08-13 PROCEDURE — G2211 COMPLEX E/M VISIT ADD ON: HCPCS | Mod: ,,, | Performed by: INTERNAL MEDICINE

## 2025-08-13 PROCEDURE — 93005 ELECTROCARDIOGRAM TRACING: CPT | Mod: PBBFAC | Performed by: STUDENT IN AN ORGANIZED HEALTH CARE EDUCATION/TRAINING PROGRAM

## 2025-08-13 PROCEDURE — 93010 ELECTROCARDIOGRAM REPORT: CPT | Mod: S$PBB,,, | Performed by: STUDENT IN AN ORGANIZED HEALTH CARE EDUCATION/TRAINING PROGRAM

## 2025-08-13 RX ORDER — ROSUVASTATIN CALCIUM 5 MG/1
5 TABLET, COATED ORAL
COMMUNITY
Start: 2025-08-01

## 2025-08-15 ENCOUNTER — OFFICE VISIT (OUTPATIENT)
Dept: CARDIOLOGY | Facility: CLINIC | Age: 65
End: 2025-08-15
Payer: MEDICARE

## 2025-08-15 VITALS
HEART RATE: 63 BPM | WEIGHT: 241.75 LBS | DIASTOLIC BLOOD PRESSURE: 92 MMHG | HEIGHT: 78 IN | OXYGEN SATURATION: 93 % | SYSTOLIC BLOOD PRESSURE: 139 MMHG | BODY MASS INDEX: 27.97 KG/M2

## 2025-08-15 DIAGNOSIS — R94.39 POSITIVE CARDIAC STRESS TEST: Primary | ICD-10-CM

## 2025-08-15 PROCEDURE — 99999 PR PBB SHADOW E&M-EST. PATIENT-LVL III: CPT | Mod: PBBFAC,,, | Performed by: STUDENT IN AN ORGANIZED HEALTH CARE EDUCATION/TRAINING PROGRAM

## 2025-08-15 PROCEDURE — 99213 OFFICE O/P EST LOW 20 MIN: CPT | Mod: PBBFAC,PN | Performed by: STUDENT IN AN ORGANIZED HEALTH CARE EDUCATION/TRAINING PROGRAM

## 2025-08-15 PROCEDURE — 99215 OFFICE O/P EST HI 40 MIN: CPT | Mod: S$PBB,,, | Performed by: STUDENT IN AN ORGANIZED HEALTH CARE EDUCATION/TRAINING PROGRAM

## 2025-08-17 RX ORDER — SODIUM CHLORIDE 0.9 % (FLUSH) 0.9 %
10 SYRINGE (ML) INJECTION
Status: SHIPPED | OUTPATIENT
Start: 2025-08-17

## 2025-08-17 RX ORDER — SODIUM CHLORIDE 9 MG/ML
INJECTION, SOLUTION INTRAVENOUS CONTINUOUS
OUTPATIENT
Start: 2025-08-17 | End: 2025-08-17

## 2025-08-17 RX ORDER — DIPHENHYDRAMINE HCL 50 MG
50 CAPSULE ORAL ONCE
OUTPATIENT
Start: 2025-08-17 | End: 2025-08-17

## 2025-08-25 DIAGNOSIS — Z00.00 ENCOUNTER FOR MEDICARE ANNUAL WELLNESS EXAM: ICD-10-CM

## (undated) DEVICE — SHEATH HEMOSTASIS 8.5FR

## (undated) DEVICE — CATH ADVISOR VL CIRC MAP BI-D

## (undated) DEVICE — KIT PROBE COVER WITH GEL

## (undated) DEVICE — DRAPE MEDIUM SHEET 40X70IN

## (undated) DEVICE — LINE PRESSURE MONITORING 96IN

## (undated) DEVICE — R CATH BIDIRECTIONL DF CRV 7FR

## (undated) DEVICE — INTRODUCER HEMOSTASIS 7.5F

## (undated) DEVICE — GLIDESHEATH SLENDER SS 5FR10CM

## (undated) DEVICE — COVER PRB TRNSDUC 7.6X183CM

## (undated) DEVICE — SET MICRO PUNCT 4FR/MPIS-401

## (undated) DEVICE — R CATH ACUSON ACUNAV 8FR

## (undated) DEVICE — CATH TACTFLEX SE BID CURVE D-F

## (undated) DEVICE — PAD DEFIB CADENCE ADULT R2

## (undated) DEVICE — KIT ENSITE ELECTRODE SURFACE

## (undated) DEVICE — COVER BAND BAG 40 X 40

## (undated) DEVICE — PACK ADMIN VARITHENA UNIVERSAL

## (undated) DEVICE — Device

## (undated) DEVICE — INTRO AGILIS MED CRL 8.5F 71CM

## (undated) DEVICE — SET BLD COL SAFETY 23G 3/4 LL

## (undated) DEVICE — NDL TRNSSPTL BRK-1 18GA 98CM

## (undated) DEVICE — SHEATH INTRODUCER 9FR 11CM

## (undated) DEVICE — WIRE MANDRIL 98/60CM

## (undated) DEVICE — KIT MICRO INTRODUCER 4F .018IN

## (undated) DEVICE — DRAPE SPLIT ADHESIVE 77X120IN

## (undated) DEVICE — BASIN SPLASH SHLD W/PAD BLUE

## (undated) DEVICE — PAD GROUND UNIV STYLE CORD 9IN

## (undated) DEVICE — SET TUBING COOL POINT IRR

## (undated) DEVICE — COVER PROBE US 5.5X58L NON LTX

## (undated) DEVICE — PAD RADI FEMORAL

## (undated) DEVICE — PACK EP DRAPE OMC